# Patient Record
Sex: FEMALE | Race: WHITE | NOT HISPANIC OR LATINO
[De-identification: names, ages, dates, MRNs, and addresses within clinical notes are randomized per-mention and may not be internally consistent; named-entity substitution may affect disease eponyms.]

---

## 2017-01-24 ENCOUNTER — RECORD ABSTRACTING (OUTPATIENT)
Age: 77
End: 2017-01-24

## 2017-01-24 DIAGNOSIS — Z87.898 PERSONAL HISTORY OF OTHER SPECIFIED CONDITIONS: ICD-10-CM

## 2017-01-24 DIAGNOSIS — Z87.2 PERSONAL HISTORY OF DISEASES OF THE SKIN AND SUBCUTANEOUS TISSUE: ICD-10-CM

## 2017-01-24 DIAGNOSIS — Z78.9 OTHER SPECIFIED HEALTH STATUS: ICD-10-CM

## 2017-01-24 DIAGNOSIS — L21.0 SEBORRHEA CAPITIS: ICD-10-CM

## 2017-01-24 DIAGNOSIS — Z87.828 PERSONAL HISTORY OF OTHER (HEALED) PHYSICAL INJURY AND TRAUMA: ICD-10-CM

## 2017-01-24 DIAGNOSIS — Z86.19 PERSONAL HISTORY OF OTHER INFECTIOUS AND PARASITIC DISEASES: ICD-10-CM

## 2017-01-24 DIAGNOSIS — R21 RASH AND OTHER NONSPECIFIC SKIN ERUPTION: ICD-10-CM

## 2017-01-24 DIAGNOSIS — L57.8 OTHER SKIN CHANGES DUE TO CHRONIC EXPOSURE TO NONIONIZING RADIATION: ICD-10-CM

## 2017-02-20 ENCOUNTER — APPOINTMENT (OUTPATIENT)
Dept: DERMATOLOGY | Facility: CLINIC | Age: 77
End: 2017-02-20

## 2017-02-20 DIAGNOSIS — Z12.83 ENCOUNTER FOR SCREENING FOR MALIGNANT NEOPLASM OF SKIN: ICD-10-CM

## 2017-02-20 RX ORDER — AZITHROMYCIN 500 MG/1
500 TABLET, FILM COATED ORAL
Qty: 7 | Refills: 0 | Status: DISCONTINUED | COMMUNITY
Start: 2016-09-16

## 2017-02-20 RX ORDER — DOXYCYCLINE HYCLATE 100 MG/1
100 TABLET ORAL
Qty: 42 | Refills: 0 | Status: DISCONTINUED | COMMUNITY
Start: 2016-11-23

## 2017-02-20 RX ORDER — SUMATRIPTAN 100 MG/1
100 TABLET, FILM COATED ORAL
Refills: 0 | Status: ACTIVE | COMMUNITY

## 2017-02-20 RX ORDER — FLUOCINOLONE ACETONIDE 0.1 MG/ML
0.01 SOLUTION TOPICAL
Refills: 0 | Status: DISCONTINUED | COMMUNITY
End: 2017-02-20

## 2017-02-20 RX ORDER — HYDROCORTISONE BUTYRATE 1 MG/ML
0.1 SOLUTION TOPICAL
Refills: 0 | Status: DISCONTINUED | COMMUNITY
End: 2017-02-20

## 2017-02-20 RX ORDER — ITRACONAZOLE 100 MG/1
CAPSULE, COATED PELLETS ORAL
Refills: 0 | Status: DISCONTINUED | COMMUNITY
End: 2017-02-20

## 2017-02-20 RX ORDER — KETOCONAZOLE 20 MG/ML
2 SHAMPOO TOPICAL
Refills: 0 | Status: DISCONTINUED | COMMUNITY
End: 2017-02-20

## 2017-02-20 RX ORDER — TRIAMCINOLONE ACETONIDE 1 MG/G
0.1 CREAM TOPICAL
Refills: 0 | Status: DISCONTINUED | COMMUNITY
End: 2017-02-20

## 2017-02-20 RX ORDER — NAPROXEN 500 MG/1
500 TABLET ORAL
Qty: 180 | Refills: 0 | Status: DISCONTINUED | COMMUNITY
Start: 2016-11-11

## 2017-02-20 RX ORDER — PREDNISONE 20 MG/1
20 TABLET ORAL
Qty: 9 | Refills: 0 | Status: DISCONTINUED | COMMUNITY
Start: 2016-11-23

## 2017-05-18 ENCOUNTER — MEDICATION RENEWAL (OUTPATIENT)
Age: 77
End: 2017-05-18

## 2017-08-06 ENCOUNTER — OTHER (OUTPATIENT)
Age: 77
End: 2017-08-06

## 2017-08-06 RX ORDER — AZITHROMYCIN DIHYDRATE 2 G/60ML
2 POWDER, FOR SUSPENSION ORAL
Qty: 1 | Refills: 2 | Status: COMPLETED | COMMUNITY
Start: 2017-05-18 | End: 2017-08-06

## 2017-09-27 ENCOUNTER — LABORATORY RESULT (OUTPATIENT)
Age: 77
End: 2017-09-27

## 2017-09-27 ENCOUNTER — CLINICAL ADVICE (OUTPATIENT)
Age: 77
End: 2017-09-27

## 2017-09-27 RX ORDER — DOXYCYCLINE HYCLATE 100 MG/1
100 CAPSULE ORAL TWICE DAILY
Qty: 42 | Refills: 0 | Status: COMPLETED | COMMUNITY
Start: 2017-09-27 | End: 2017-10-18

## 2017-09-28 ENCOUNTER — OTHER (OUTPATIENT)
Age: 77
End: 2017-09-28

## 2017-10-05 ENCOUNTER — LABORATORY RESULT (OUTPATIENT)
Age: 77
End: 2017-10-05

## 2017-10-05 DIAGNOSIS — G89.29 PAIN IN UNSPECIFIED JOINT: ICD-10-CM

## 2017-10-05 DIAGNOSIS — M25.50 PAIN IN UNSPECIFIED JOINT: ICD-10-CM

## 2017-10-09 PROBLEM — M25.50 CHRONIC JOINT PAIN: Status: ACTIVE | Noted: 2017-10-09

## 2017-10-11 ENCOUNTER — EMERGENCY (EMERGENCY)
Facility: HOSPITAL | Age: 77
LOS: 0 days | Discharge: HOME | End: 2017-10-11
Attending: EMERGENCY MEDICINE | Admitting: EMERGENCY MEDICINE
Payer: MEDICARE

## 2017-10-11 VITALS
HEART RATE: 95 BPM | RESPIRATION RATE: 18 BRPM | WEIGHT: 130.07 LBS | HEIGHT: 66 IN | TEMPERATURE: 98 F | SYSTOLIC BLOOD PRESSURE: 107 MMHG | DIASTOLIC BLOOD PRESSURE: 54 MMHG | OXYGEN SATURATION: 95 %

## 2017-10-11 VITALS
HEART RATE: 86 BPM | SYSTOLIC BLOOD PRESSURE: 124 MMHG | DIASTOLIC BLOOD PRESSURE: 75 MMHG | OXYGEN SATURATION: 95 % | RESPIRATION RATE: 18 BRPM

## 2017-10-11 DIAGNOSIS — J44.9 CHRONIC OBSTRUCTIVE PULMONARY DISEASE, UNSPECIFIED: ICD-10-CM
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
## 2017-10-11 DIAGNOSIS — T17.298A OTHER FOREIGN OBJECT IN PHARYNX CAUSING OTHER INJURY, INITIAL ENCOUNTER: ICD-10-CM

## 2017-10-11 DIAGNOSIS — Z87.891 PERSONAL HISTORY OF NICOTINE DEPENDENCE: ICD-10-CM

## 2017-10-11 PROCEDURE — 99284 EMERGENCY DEPT VISIT MOD MDM: CPT

## 2017-10-11 PROCEDURE — 93010 ELECTROCARDIOGRAM REPORT: CPT

## 2017-10-11 PROCEDURE — 71020: CPT | Mod: 26

## 2017-10-11 PROCEDURE — 70360 X-RAY EXAM OF NECK: CPT | Mod: 26

## 2017-10-11 RX ORDER — IPRATROPIUM/ALBUTEROL SULFATE 18-103MCG
3 AEROSOL WITH ADAPTER (GRAM) INHALATION ONCE
Qty: 0 | Refills: 0 | Status: COMPLETED | OUTPATIENT
Start: 2017-10-11 | End: 2017-10-11

## 2017-10-11 RX ORDER — SUCRALFATE 1 G
1 TABLET ORAL ONCE
Qty: 0 | Refills: 0 | Status: COMPLETED | OUTPATIENT
Start: 2017-10-11 | End: 2017-10-11

## 2017-10-11 RX ADMIN — Medication 3 MILLILITER(S): at 13:05

## 2017-10-11 RX ADMIN — Medication 1 GRAM(S): at 13:04

## 2017-10-11 NOTE — ED ADULT TRIAGE NOTE - CHIEF COMPLAINT QUOTE
pt was taking a doxycycline pill , began choking on pill, possible aspiration , throat began burning and coughing , pt was able to keep down a cup of milk post choking , pt is currently being treated for lymes

## 2017-10-11 NOTE — ED PROVIDER NOTE - ENMT, MLM
Patient tolerating fluid intake by mouth.  Airway patent, Nasal mucosa clear. Mouth with normal mucosa. Throat has no vesicles, no oropharyngeal exudates and uvula is midline.

## 2017-10-11 NOTE — ED PROVIDER NOTE - PROGRESS NOTE DETAILS
improved.  Sleeping in bed.  No active symptoms. improved.  Sleeping in bed.  No active symptoms.  Given nebulizer.  Discussed return conditions and risk for possible aspiration PNA given todays events.  Return if sob, fever, difficulty breathing.  See PMD if still having symptoms in 1-2 days. Tolerating PO fluids.  Says she feels well.  Will d/c.

## 2017-10-11 NOTE — ED PROVIDER NOTE - MEDICAL DECISION MAKING DETAILS
Pt with choking episode after swallowing doxycycline capsule.  Plan xray, reassess, PO challenge. Pt with choking episode after swallowing doxycycline capsule.  Plan xray, reassess, PO challenge.  Nebulizer.  Consider bronchospasm after choking.  No distress or focal unilateral wheezing now.  Carafate given pain when she swallows in her throat.

## 2017-10-11 NOTE — ED PROVIDER NOTE - NS_ ATTENDINGSCRIBEDETAILS _ED_A_ED_FT
I, Rito Valdez, performed the initial face to face bedside interview with this patient regarding history of present illness, review of symptoms and relevant past medical, social and family history.  I completed an independent physical examination.  I was the initial provider who evaluated this patient.  The history, relevant review of systems, past medical and surgical history, medical decision making, and physical examination was documented by the scribe in my presence and I attest to the accuracy of the documentation.

## 2017-10-11 NOTE — ED PROVIDER NOTE - MUSCULOSKELETAL, MLM
Spine appears normal, range of motion is not limited, no muscle or joint tenderness. No chest wall crepitus.

## 2017-10-11 NOTE — ED ADULT NURSE NOTE - CHIEF COMPLAINT QUOTE
pt presents to ED with complaints of pill stuck in her throat, pt states she took a doxycycline pill at approx 1015am

## 2017-10-11 NOTE — ED PROVIDER NOTE - OBJECTIVE STATEMENT
77 yo female PMH COPD, asthma, presents c/o throat pain and difficulty breathing.  Pt with recent Lyme disease dx on doxycycline, states she choked on doxycycline capsule this morning and was unable to breathe.  Pt was able to call her son and the ambulance shortly after symptoms improved. 75 yo female PMH COPD, asthma, presents c/o throat pain and difficulty breathing.  Pt with recent Lyme disease dx on doxycycline, states she choked on doxycycline capsule this morning and was unable to breathe.  She coughed it up but since then her throat hurts.  Pt was able to call her son and the ambulance shortly after symptoms improved.  Pt says her breathing is much better but her throat hurts mostly now.  Able to tolerate saliva.

## 2017-10-31 ENCOUNTER — APPOINTMENT (OUTPATIENT)
Dept: INTERNAL MEDICINE | Facility: CLINIC | Age: 77
End: 2017-10-31
Payer: MEDICARE

## 2017-10-31 VITALS
DIASTOLIC BLOOD PRESSURE: 80 MMHG | HEIGHT: 63 IN | RESPIRATION RATE: 16 BRPM | HEART RATE: 90 BPM | OXYGEN SATURATION: 95 % | SYSTOLIC BLOOD PRESSURE: 124 MMHG | WEIGHT: 115 LBS | BODY MASS INDEX: 20.38 KG/M2 | TEMPERATURE: 97.8 F

## 2017-10-31 PROCEDURE — 94060 EVALUATION OF WHEEZING: CPT

## 2017-10-31 PROCEDURE — ZZZZZ: CPT

## 2017-10-31 PROCEDURE — 99214 OFFICE O/P EST MOD 30 MIN: CPT | Mod: 25

## 2017-12-12 DIAGNOSIS — N60.19 DIFFUSE CYSTIC MASTOPATHY OF UNSPECIFIED BREAST: ICD-10-CM

## 2017-12-13 PROBLEM — N60.19 CYSTIC DISEASE OF BREAST: Status: ACTIVE | Noted: 2017-12-13

## 2017-12-27 ENCOUNTER — TRANSCRIPTION ENCOUNTER (OUTPATIENT)
Age: 77
End: 2017-12-27

## 2018-01-19 ENCOUNTER — RESULT REVIEW (OUTPATIENT)
Age: 78
End: 2018-01-19

## 2018-01-30 ENCOUNTER — APPOINTMENT (OUTPATIENT)
Dept: INTERNAL MEDICINE | Facility: CLINIC | Age: 78
End: 2018-01-30
Payer: MEDICARE

## 2018-01-30 ENCOUNTER — MED ADMIN CHARGE (OUTPATIENT)
Age: 78
End: 2018-01-30

## 2018-01-30 PROCEDURE — 90662 IIV NO PRSV INCREASED AG IM: CPT

## 2018-01-30 PROCEDURE — G0008: CPT

## 2018-02-14 ENCOUNTER — MEDICATION RENEWAL (OUTPATIENT)
Age: 78
End: 2018-02-14

## 2018-02-20 ENCOUNTER — APPOINTMENT (OUTPATIENT)
Dept: DERMATOLOGY | Facility: CLINIC | Age: 78
End: 2018-02-20
Payer: MEDICARE

## 2018-02-20 ENCOUNTER — MED ADMIN CHARGE (OUTPATIENT)
Age: 78
End: 2018-02-20

## 2018-02-20 VITALS — WEIGHT: 115 LBS | BODY MASS INDEX: 20.38 KG/M2 | HEIGHT: 63 IN

## 2018-02-20 PROCEDURE — 99213 OFFICE O/P EST LOW 20 MIN: CPT

## 2018-02-20 RX ORDER — DEXTROAMPHETAMINE SACCHARATE, AMPHETAMINE ASPARTATE MONOHYDRATE, DEXTROAMPHETAMINE SULFATE AND AMPHETAMINE SULFATE 7.5; 7.5; 7.5; 7.5 MG/1; MG/1; MG/1; MG/1
30 CAPSULE, EXTENDED RELEASE ORAL
Qty: 90 | Refills: 0 | Status: COMPLETED | COMMUNITY
Start: 2016-11-15 | End: 2018-02-20

## 2018-02-20 RX ORDER — ALBUTEROL SULFATE 90 UG/1
108 (90 BASE) AEROSOL, METERED RESPIRATORY (INHALATION)
Qty: 1 | Refills: 3 | Status: COMPLETED | COMMUNITY
Start: 2017-11-01 | End: 2018-02-20

## 2018-03-06 ENCOUNTER — LABORATORY RESULT (OUTPATIENT)
Age: 78
End: 2018-03-06

## 2018-03-06 ENCOUNTER — APPOINTMENT (OUTPATIENT)
Dept: RHEUMATOLOGY | Facility: CLINIC | Age: 78
End: 2018-03-06
Payer: MEDICARE

## 2018-03-06 VITALS
WEIGHT: 115 LBS | DIASTOLIC BLOOD PRESSURE: 85 MMHG | HEART RATE: 103 BPM | SYSTOLIC BLOOD PRESSURE: 147 MMHG | BODY MASS INDEX: 20.38 KG/M2 | HEIGHT: 63 IN

## 2018-03-06 DIAGNOSIS — Z82.61 FAMILY HISTORY OF ARTHRITIS: ICD-10-CM

## 2018-03-06 PROCEDURE — 99205 OFFICE O/P NEW HI 60 MIN: CPT

## 2018-03-08 LAB
ADJUSTED MITOGEN: >10 IU/ML
ADJUSTED TB AG: 0.04 IU/ML
ALBUMIN SERPL ELPH-MCNC: 3.9 G/DL
ALP BLD-CCNC: 135 U/L
ALT SERPL-CCNC: 8 U/L
ANA SER IF-ACNC: NEGATIVE
ANION GAP SERPL CALC-SCNC: 14 MMOL/L
APPEARANCE: CLEAR
AST SERPL-CCNC: 15 U/L
BASOPHILS # BLD AUTO: 0.06 K/UL
BASOPHILS NFR BLD AUTO: 0.6 %
BILIRUB SERPL-MCNC: 0.2 MG/DL
BILIRUBIN URINE: NEGATIVE
BLOOD URINE: NEGATIVE
BUN SERPL-MCNC: 21 MG/DL
C3 SERPL-MCNC: 139 MG/DL
C4 SERPL-MCNC: 40 MG/DL
CALCIUM SERPL-MCNC: 9.2 MG/DL
CCP AB SER IA-ACNC: <8 UNITS
CHLORIDE SERPL-SCNC: 99 MMOL/L
CO2 SERPL-SCNC: 26 MMOL/L
COLOR: YELLOW
CREAT SERPL-MCNC: 0.6 MG/DL
CRP SERPL-MCNC: 3.2 MG/DL
DSDNA AB SER-ACNC: <12 IU/ML
ENA RNP AB SER IA-ACNC: 4.8 AL
ENA SM AB SER IA-ACNC: <0.2 AL
ENA SS-A AB SER IA-ACNC: <0.2 AL
ENA SS-B AB SER IA-ACNC: <0.2 AL
EOSINOPHIL # BLD AUTO: 0.34 K/UL
EOSINOPHIL NFR BLD AUTO: 3.7 %
ERYTHROCYTE [SEDIMENTATION RATE] IN BLOOD BY WESTERGREN METHOD: 58 MM/HR
G6PD SER-CCNC: 16.2 U/G HGB
GLUCOSE QUALITATIVE U: NEGATIVE MG/DL
GLUCOSE SERPL-MCNC: 113 MG/DL
HBV CORE IGG+IGM SER QL: NONREACTIVE
HBV SURFACE AB SERPL IA-ACNC: <3 MIU/ML
HBV SURFACE AG SER QL: NONREACTIVE
HCT VFR BLD CALC: 42.2 %
HCV AB SER QL: NONREACTIVE
HCV S/CO RATIO: 0.12 S/CO
HGB BLD-MCNC: 13.5 G/DL
IMM GRANULOCYTES NFR BLD AUTO: 0.2 %
KETONES URINE: NEGATIVE
LEUKOCYTE ESTERASE URINE: ABNORMAL
LYMPHOCYTES # BLD AUTO: 2.43 K/UL
LYMPHOCYTES NFR BLD AUTO: 26.3 %
M TB IFN-G BLD-IMP: NEGATIVE
MAN DIFF?: NORMAL
MCHC RBC-ENTMCNC: 29.3 PG
MCHC RBC-ENTMCNC: 32 GM/DL
MCV RBC AUTO: 91.5 FL
MONOCYTES # BLD AUTO: 0.46 K/UL
MONOCYTES NFR BLD AUTO: 5 %
NEUTROPHILS # BLD AUTO: 5.93 K/UL
NEUTROPHILS NFR BLD AUTO: 64.2 %
NITRITE URINE: NEGATIVE
PH URINE: 5
PLATELET # BLD AUTO: 395 K/UL
POTASSIUM SERPL-SCNC: 4.3 MMOL/L
PROT SERPL-MCNC: 7.4 G/DL
PROTEIN URINE: NEGATIVE MG/DL
QUANTIFERON GOLD NIL: 0.09 IU/ML
RBC # BLD: 4.61 M/UL
RBC # FLD: 14.3 %
RF+CCP IGG SER-IMP: NEGATIVE
RHEUMATOID FACT SER QL: 13 IU/ML
SODIUM SERPL-SCNC: 139 MMOL/L
SPECIFIC GRAVITY URINE: 1.03
THYROGLOB AB SERPL-ACNC: <20 IU/ML
THYROPEROXIDASE AB SERPL IA-ACNC: <10 IU/ML
UROBILINOGEN URINE: NEGATIVE MG/DL
WBC # FLD AUTO: 9.24 K/UL

## 2018-03-20 ENCOUNTER — APPOINTMENT (OUTPATIENT)
Dept: RHEUMATOLOGY | Facility: CLINIC | Age: 78
End: 2018-03-20
Payer: MEDICARE

## 2018-03-20 VITALS
DIASTOLIC BLOOD PRESSURE: 86 MMHG | BODY MASS INDEX: 20.73 KG/M2 | HEIGHT: 63 IN | HEART RATE: 106 BPM | WEIGHT: 117 LBS | SYSTOLIC BLOOD PRESSURE: 152 MMHG

## 2018-03-20 PROCEDURE — 99215 OFFICE O/P EST HI 40 MIN: CPT

## 2018-03-21 RX ORDER — OFLOXACIN 3 MG/ML
0.3 SOLUTION/ DROPS OPHTHALMIC 4 TIMES DAILY
Qty: 1 | Refills: 3 | Status: COMPLETED | COMMUNITY
Start: 2017-12-08 | End: 2018-03-21

## 2018-05-01 ENCOUNTER — MEDICATION RENEWAL (OUTPATIENT)
Age: 78
End: 2018-05-01

## 2018-05-01 ENCOUNTER — APPOINTMENT (OUTPATIENT)
Dept: RHEUMATOLOGY | Facility: CLINIC | Age: 78
End: 2018-05-01
Payer: MEDICARE

## 2018-05-01 VITALS
HEART RATE: 97 BPM | HEIGHT: 63 IN | DIASTOLIC BLOOD PRESSURE: 84 MMHG | OXYGEN SATURATION: 98 % | SYSTOLIC BLOOD PRESSURE: 135 MMHG | WEIGHT: 120 LBS | BODY MASS INDEX: 21.26 KG/M2

## 2018-05-01 PROCEDURE — 99214 OFFICE O/P EST MOD 30 MIN: CPT

## 2018-05-03 LAB
25(OH)D3 SERPL-MCNC: 16.3 NG/ML
ALBUMIN SERPL ELPH-MCNC: 4.2 G/DL
ALP BLD-CCNC: 93 U/L
ALT SERPL-CCNC: 15 U/L
ANION GAP SERPL CALC-SCNC: 13 MMOL/L
AST SERPL-CCNC: 23 U/L
BASOPHILS # BLD AUTO: 0.04 K/UL
BASOPHILS NFR BLD AUTO: 0.4 %
BILIRUB SERPL-MCNC: 0.4 MG/DL
BUN SERPL-MCNC: 15 MG/DL
CALCIUM SERPL-MCNC: 9.6 MG/DL
CHLORIDE SERPL-SCNC: 100 MMOL/L
CO2 SERPL-SCNC: 27 MMOL/L
CREAT SERPL-MCNC: 0.81 MG/DL
CRP SERPL-MCNC: 0.5 MG/DL
EOSINOPHIL # BLD AUTO: 0.06 K/UL
EOSINOPHIL NFR BLD AUTO: 0.7 %
ERYTHROCYTE [SEDIMENTATION RATE] IN BLOOD BY WESTERGREN METHOD: 27 MM/HR
GLUCOSE SERPL-MCNC: 108 MG/DL
HCT VFR BLD CALC: 44.1 %
HGB BLD-MCNC: 13.6 G/DL
IMM GRANULOCYTES NFR BLD AUTO: 0.3 %
LYMPHOCYTES # BLD AUTO: 1.21 K/UL
LYMPHOCYTES NFR BLD AUTO: 13.1 %
MAN DIFF?: NORMAL
MCHC RBC-ENTMCNC: 28.9 PG
MCHC RBC-ENTMCNC: 30.8 GM/DL
MCV RBC AUTO: 93.6 FL
MONOCYTES # BLD AUTO: 0.25 K/UL
MONOCYTES NFR BLD AUTO: 2.7 %
NEUTROPHILS # BLD AUTO: 7.62 K/UL
NEUTROPHILS NFR BLD AUTO: 82.8 %
PLATELET # BLD AUTO: 319 K/UL
POTASSIUM SERPL-SCNC: 4.9 MMOL/L
PROT SERPL-MCNC: 7.3 G/DL
RBC # BLD: 4.71 M/UL
RBC # FLD: 15.1 %
SODIUM SERPL-SCNC: 140 MMOL/L
WBC # FLD AUTO: 9.21 K/UL

## 2018-05-07 ENCOUNTER — MEDICATION RENEWAL (OUTPATIENT)
Age: 78
End: 2018-05-07

## 2018-05-17 ENCOUNTER — MEDICATION RENEWAL (OUTPATIENT)
Age: 78
End: 2018-05-17

## 2018-05-17 ENCOUNTER — APPOINTMENT (OUTPATIENT)
Dept: INTERNAL MEDICINE | Facility: CLINIC | Age: 78
End: 2018-05-17
Payer: MEDICARE

## 2018-05-17 VITALS
BODY MASS INDEX: 21.26 KG/M2 | HEIGHT: 63 IN | OXYGEN SATURATION: 97 % | HEART RATE: 102 BPM | DIASTOLIC BLOOD PRESSURE: 70 MMHG | WEIGHT: 120 LBS | RESPIRATION RATE: 20 BRPM | SYSTOLIC BLOOD PRESSURE: 120 MMHG | TEMPERATURE: 98.3 F

## 2018-05-17 LAB
BASOPHILS # BLD AUTO: 0.04 K/UL
BASOPHILS NFR BLD AUTO: 0.4 %
EOSINOPHIL # BLD AUTO: 0.05 K/UL
EOSINOPHIL NFR BLD AUTO: 0.5 %
HCT VFR BLD CALC: 43.8 %
HGB BLD-MCNC: 13.5 G/DL
IMM GRANULOCYTES NFR BLD AUTO: 0.4 %
LYMPHOCYTES # BLD AUTO: 1.23 K/UL
LYMPHOCYTES NFR BLD AUTO: 13.3 %
MAN DIFF?: NORMAL
MCHC RBC-ENTMCNC: 29.3 PG
MCHC RBC-ENTMCNC: 30.8 GM/DL
MCV RBC AUTO: 95 FL
MONOCYTES # BLD AUTO: 0.36 K/UL
MONOCYTES NFR BLD AUTO: 3.9 %
NEUTROPHILS # BLD AUTO: 7.5 K/UL
NEUTROPHILS NFR BLD AUTO: 81.5 %
PLATELET # BLD AUTO: 314 K/UL
RBC # BLD: 4.61 M/UL
RBC # FLD: 15.3 %
WBC # FLD AUTO: 9.22 K/UL

## 2018-05-17 PROCEDURE — 99214 OFFICE O/P EST MOD 30 MIN: CPT

## 2018-05-17 NOTE — ASSESSMENT
[FreeTextEntry1] : #1 upper abdominal discomfort for one week. She has h.o. previous erosive gastritis early this year. she will resume omeprazole 20 mg p.o. q.d. Decrease or discontinue coffee. No irritants. Call or return if symptoms are not resolving.\par \par #2 mild diarrhea. Improved today. Ensure fluids. BRAT diet. Call or return if not resolving.\par \par #3 question of swelling of tongue by history. has ? tiny sore left lateral tongue. Treat with rinses p.r.n.   Her prednisone dose was increased to 20 mg/d by rheumatology.   Patient knows if she develops swelling or swelling and itching of tongue, throat, or lips, she will take Claritin 10 mg p.o. q.d. and Pepcid 20 mg p.o. q.d. and Benadryl p.r.n. If she does have significant swelling and itching she knows to go to go the ER.  Call or return if having any symptoms.\par \par #4 Sores corners of mouth. Improved.\par \par

## 2018-05-17 NOTE — PHYSICAL EXAM
[No Acute Distress] : no acute distress [Well Nourished] : well nourished [Well Developed] : well developed [Well-Appearing] : well-appearing [Normal Sclera/Conjunctiva] : normal sclera/conjunctiva [PERRL] : pupils equal round and reactive to light [Normal Outer Ear/Nose] : the outer ears and nose were normal in appearance [Normal Oropharynx] : the oropharynx was normal [No JVD] : no jugular venous distention [Supple] : supple [No Lymphadenopathy] : no lymphadenopathy [No Respiratory Distress] : no respiratory distress  [No Accessory Muscle Use] : no accessory muscle use [Normal Rate] : normal rate  [Regular Rhythm] : with a regular rhythm [Normal S1, S2] : normal S1 and S2 [No Murmur] : no murmur heard [No Edema] : there was no peripheral edema [No Extremity Clubbing/Cyanosis] : no extremity clubbing/cyanosis [Soft] : abdomen soft [Non Tender] : non-tender [Non-distended] : non-distended [No HSM] : no HSM [Normal Anterior Cervical Nodes] : no anterior cervical lymphadenopathy [No Rash] : no rash [de-identified] : tongue does not lookl red or swollen. ? tiny sore L lat area [de-identified] : some rhonchi bilat [de-identified] : 84, reg [de-identified] : ? sl upper guarding [de-identified] : alert

## 2018-05-17 NOTE — REVIEW OF SYSTEMS
[Fever] : no fever [Chills] : no chills [Night Sweats] : no night sweats [Vomiting] : no vomiting [Melena] : no melena [FreeTextEntry4] : see above [FreeTextEntry7] : see above

## 2018-05-17 NOTE — HISTORY OF PRESENT ILLNESS
[de-identified] : This is a pleasant 77-year-old female who has noted upper abdominal discomfort and mild diarrhea during the last week. The latter has improved and she had only one mushy stool this morning. Abdominal discomfort feels like it did in January when she was told she had erosive gastritis on upper endoscopy was treated with omeprazole. Also feels some gas. She is not having nausea, vomiting, blood per rectum, or melena  Has 1 coffee/ d.  no NSAID use or alcohol. Not not having fever, chills, sweats.\par \par She also had some sores on the corners of her mouth and these are resolved. She felt some swelling of the tongue and has a very \par small ? sore on the lateral left tongue area. Is not noting any itching of the tongue, throat, or lips. No swelling of the throat or lips.\par \par Rheumatology did not begin methotrexate.  prednisone was increased to 20 mg/d.\par \par \par \par

## 2018-05-20 LAB
ALBUMIN SERPL ELPH-MCNC: 4.1 G/DL
ALP BLD-CCNC: 86 U/L
ALT SERPL-CCNC: 18 U/L
ANION GAP SERPL CALC-SCNC: 12 MMOL/L
AST SERPL-CCNC: 21 U/L
BILIRUB SERPL-MCNC: 0.2 MG/DL
BUN SERPL-MCNC: 19 MG/DL
C3 SERPL-MCNC: 119 MG/DL
C4 SERPL-MCNC: 41 MG/DL
CALCIUM SERPL-MCNC: 9.3 MG/DL
CHLORIDE SERPL-SCNC: 104 MMOL/L
CO2 SERPL-SCNC: 27 MMOL/L
CREAT SERPL-MCNC: 0.75 MG/DL
CRP SERPL-MCNC: 1.3 MG/DL
DSDNA AB SER-ACNC: 13 IU/ML
ENDOMYSIUM IGA SER QL: NEGATIVE
ENDOMYSIUM IGA TITR SER: NORMAL
ERYTHROCYTE [SEDIMENTATION RATE] IN BLOOD BY WESTERGREN METHOD: 24 MM/HR
GLIADIN IGA SER QL: 5.6 UNITS
GLIADIN IGG SER QL: <5 UNITS
GLIADIN PEPTIDE IGA SER-ACNC: NEGATIVE
GLIADIN PEPTIDE IGG SER-ACNC: NEGATIVE
GLUCOSE SERPL-MCNC: 121 MG/DL
POTASSIUM SERPL-SCNC: 4.4 MMOL/L
PROT SERPL-MCNC: 7 G/DL
SODIUM SERPL-SCNC: 143 MMOL/L
TTG IGA SER IA-ACNC: 9.7 UNITS
TTG IGA SER-ACNC: NEGATIVE
TTG IGG SER IA-ACNC: <5 UNITS
TTG IGG SER IA-ACNC: NEGATIVE

## 2018-05-22 ENCOUNTER — MEDICATION RENEWAL (OUTPATIENT)
Age: 78
End: 2018-05-22

## 2018-05-22 RX ORDER — ESOMEPRAZOLE MAGNESIUM 40 MG/1
CAPSULE, DELAYED RELEASE ORAL
Refills: 0 | Status: COMPLETED | COMMUNITY
End: 2018-05-22

## 2018-05-25 ENCOUNTER — RX RENEWAL (OUTPATIENT)
Age: 78
End: 2018-05-25

## 2018-06-26 ENCOUNTER — APPOINTMENT (OUTPATIENT)
Dept: RHEUMATOLOGY | Facility: CLINIC | Age: 78
End: 2018-06-26
Payer: MEDICARE

## 2018-06-26 VITALS
SYSTOLIC BLOOD PRESSURE: 147 MMHG | WEIGHT: 125 LBS | OXYGEN SATURATION: 96 % | HEART RATE: 100 BPM | HEIGHT: 63 IN | BODY MASS INDEX: 22.15 KG/M2 | DIASTOLIC BLOOD PRESSURE: 80 MMHG

## 2018-06-26 PROCEDURE — 99214 OFFICE O/P EST MOD 30 MIN: CPT

## 2018-06-28 ENCOUNTER — MEDICATION RENEWAL (OUTPATIENT)
Age: 78
End: 2018-06-28

## 2018-06-29 ENCOUNTER — MEDICATION RENEWAL (OUTPATIENT)
Age: 78
End: 2018-06-29

## 2018-07-26 ENCOUNTER — RX RENEWAL (OUTPATIENT)
Age: 78
End: 2018-07-26

## 2018-07-31 LAB
ALBUMIN SERPL ELPH-MCNC: 4.2 G/DL
ALP BLD-CCNC: 66 U/L
ALT SERPL-CCNC: 21 U/L
ANION GAP SERPL CALC-SCNC: 13 MMOL/L
AST SERPL-CCNC: 24 U/L
BASOPHILS # BLD AUTO: 0.03 K/UL
BASOPHILS NFR BLD AUTO: 0.3 %
BILIRUB SERPL-MCNC: 0.6 MG/DL
BUN SERPL-MCNC: 21 MG/DL
CALCIUM SERPL-MCNC: 8.9 MG/DL
CHLORIDE SERPL-SCNC: 104 MMOL/L
CO2 SERPL-SCNC: 26 MMOL/L
CREAT SERPL-MCNC: 0.68 MG/DL
EOSINOPHIL # BLD AUTO: 0.08 K/UL
EOSINOPHIL NFR BLD AUTO: 0.8 %
GLUCOSE SERPL-MCNC: 89 MG/DL
HCT VFR BLD CALC: 43.5 %
HGB BLD-MCNC: 13.6 G/DL
IMM GRANULOCYTES NFR BLD AUTO: 0.7 %
LYMPHOCYTES # BLD AUTO: 1.14 K/UL
LYMPHOCYTES NFR BLD AUTO: 10.9 %
MAN DIFF?: NORMAL
MCHC RBC-ENTMCNC: 29.8 PG
MCHC RBC-ENTMCNC: 31.3 GM/DL
MCV RBC AUTO: 95.2 FL
MONOCYTES # BLD AUTO: 0.33 K/UL
MONOCYTES NFR BLD AUTO: 3.2 %
NEUTROPHILS # BLD AUTO: 8.8 K/UL
NEUTROPHILS NFR BLD AUTO: 84.1 %
PLATELET # BLD AUTO: 268 K/UL
POTASSIUM SERPL-SCNC: 4.4 MMOL/L
PROT SERPL-MCNC: 6.5 G/DL
RBC # BLD: 4.57 M/UL
RBC # FLD: 15.2 %
SODIUM SERPL-SCNC: 143 MMOL/L
WBC # FLD AUTO: 10.45 K/UL

## 2018-08-02 ENCOUNTER — APPOINTMENT (OUTPATIENT)
Dept: RHEUMATOLOGY | Facility: CLINIC | Age: 78
End: 2018-08-02
Payer: MEDICARE

## 2018-08-02 VITALS
WEIGHT: 125 LBS | BODY MASS INDEX: 22.15 KG/M2 | OXYGEN SATURATION: 97 % | HEART RATE: 95 BPM | DIASTOLIC BLOOD PRESSURE: 90 MMHG | HEIGHT: 63 IN | SYSTOLIC BLOOD PRESSURE: 138 MMHG

## 2018-08-02 PROBLEM — J45.909 UNSPECIFIED ASTHMA, UNCOMPLICATED: Chronic | Status: ACTIVE | Noted: 2017-10-11

## 2018-08-02 PROCEDURE — 99214 OFFICE O/P EST MOD 30 MIN: CPT | Mod: 25

## 2018-08-02 PROCEDURE — 20610 DRAIN/INJ JOINT/BURSA W/O US: CPT | Mod: RT

## 2018-08-02 RX ORDER — METHYLPREDNISOLONE ACETATE 80 MG/ML
80 INJECTION, SUSPENSION INTRA-ARTICULAR; INTRALESIONAL; INTRAMUSCULAR; SOFT TISSUE
Refills: 0 | Status: COMPLETED | OUTPATIENT
Start: 2018-08-02

## 2018-08-02 RX ADMIN — METHYLPREDNISOLONE ACETATE MG/ML: 80 INJECTION, SUSPENSION INTRA-ARTICULAR; INTRALESIONAL; INTRAMUSCULAR; SOFT TISSUE at 00:00

## 2018-08-07 ENCOUNTER — MEDICATION RENEWAL (OUTPATIENT)
Age: 78
End: 2018-08-07

## 2018-08-08 ENCOUNTER — APPOINTMENT (OUTPATIENT)
Dept: INTERNAL MEDICINE | Facility: CLINIC | Age: 78
End: 2018-08-08
Payer: MEDICARE

## 2018-08-08 VITALS
SYSTOLIC BLOOD PRESSURE: 142 MMHG | WEIGHT: 125 LBS | BODY MASS INDEX: 22.15 KG/M2 | DIASTOLIC BLOOD PRESSURE: 84 MMHG | HEART RATE: 90 BPM | OXYGEN SATURATION: 97 % | HEIGHT: 63 IN | RESPIRATION RATE: 18 BRPM | TEMPERATURE: 98.6 F

## 2018-08-08 PROCEDURE — 99214 OFFICE O/P EST MOD 30 MIN: CPT | Mod: 25

## 2018-08-08 PROCEDURE — 90471 IMMUNIZATION ADMIN: CPT | Mod: GY

## 2018-08-08 PROCEDURE — 90715 TDAP VACCINE 7 YRS/> IM: CPT | Mod: GY,GA

## 2018-08-08 NOTE — HISTORY OF PRESENT ILLNESS
[de-identified] : This is a 77-year-old female with a history of rheumatoid arthritis, mixed connective tissue disease, accidentally opened the car door and scraped her R anterior weeks ago. She is noting more swelling the last few days and some in tenderness to touch. the base of the scraped  does not show purulence. She is not having  fever, or chills. Temperature at home and here it is normal.\par \par She is on prednisone 7.5 mg p.o. q.d. hydroxychloroquine 200 mg p.o. b.i.d. She takes methotrexate 12.5 mg/week. She did not take her methotrexate dose yesterday and also will skip next Tuesday's dose.

## 2018-08-08 NOTE — ASSESSMENT
[FreeTextEntry1] : #1 Scrape anterior R leg with mild surrounding cellulitis. There is some mild swelling in the area of the scrape and some inferior to it. Gentle Betadine/water washes b.i.d.  mupirocin ointment t.i.d.  Cover with the sterile gauze. Importantly, elevate extremity as much as possible to treat swelling.  Minocycline 50 mg p.o. b.i.d. x7 days. (did not tolerate 100 mg doxycycline in past, but patrick 50 minocycline).  RV 1 week.  Call, return if not resolving.\par \par Hold methotrexate x 2 doses.\par \par CBC, BMP.

## 2018-08-08 NOTE — PHYSICAL EXAM
[No Acute Distress] : no acute distress [Well Nourished] : well nourished [Well Developed] : well developed [Well-Appearing] : well-appearing [Normal Sclera/Conjunctiva] : normal sclera/conjunctiva [PERRL] : pupils equal round and reactive to light [Normal Outer Ear/Nose] : the outer ears and nose were normal in appearance [Normal Oropharynx] : the oropharynx was normal [No JVD] : no jugular venous distention [Supple] : supple [No Lymphadenopathy] : no lymphadenopathy [No Respiratory Distress] : no respiratory distress  [Clear to Auscultation] : lungs were clear to auscultation bilaterally [No Accessory Muscle Use] : no accessory muscle use [Normal Rate] : normal rate  [Regular Rhythm] : with a regular rhythm [Normal S1, S2] : normal S1 and S2 [No Carotid Bruits] : no carotid bruits [No Edema] : there was no peripheral edema [No Extremity Clubbing/Cyanosis] : no extremity clubbing/cyanosis [Soft] : abdomen soft [Non Tender] : non-tender [Non-distended] : non-distended [No Masses] : no abdominal mass palpated [No HSM] : no HSM [Normal Bowel Sounds] : normal bowel sounds [Normal Anterior Cervical Nodes] : no anterior cervical lymphadenopathy [Normal Gait] : normal gait [Deep Tendon Reflexes (DTR)] : deep tendon reflexes were 2+ and symmetric [Normal Affect] : the affect was normal [Normal Insight/Judgement] : insight and judgment were intact [de-identified] : no palp ing LN's [de-identified] : scrape R ant leg, 1/2x1 in. base w/out gross purulence. mild swell around and inferiorly.  mild surrounding redness and mild warmth.

## 2018-08-09 ENCOUNTER — MEDICATION RENEWAL (OUTPATIENT)
Age: 78
End: 2018-08-09

## 2018-08-09 ENCOUNTER — TRANSCRIPTION ENCOUNTER (OUTPATIENT)
Age: 78
End: 2018-08-09

## 2018-08-15 ENCOUNTER — MEDICATION RENEWAL (OUTPATIENT)
Age: 78
End: 2018-08-15

## 2018-08-16 ENCOUNTER — APPOINTMENT (OUTPATIENT)
Dept: INTERNAL MEDICINE | Facility: CLINIC | Age: 78
End: 2018-08-16

## 2018-08-17 ENCOUNTER — TRANSCRIPTION ENCOUNTER (OUTPATIENT)
Age: 78
End: 2018-08-17

## 2018-08-17 ENCOUNTER — MEDICATION RENEWAL (OUTPATIENT)
Age: 78
End: 2018-08-17

## 2018-09-04 ENCOUNTER — RX RENEWAL (OUTPATIENT)
Age: 78
End: 2018-09-04

## 2018-09-06 ENCOUNTER — MEDICATION RENEWAL (OUTPATIENT)
Age: 78
End: 2018-09-06

## 2018-09-06 ENCOUNTER — TRANSCRIPTION ENCOUNTER (OUTPATIENT)
Age: 78
End: 2018-09-06

## 2018-09-13 LAB
25(OH)D3 SERPL-MCNC: 57 NG/ML
ALBUMIN SERPL ELPH-MCNC: 4.2 G/DL
ALP BLD-CCNC: 78 U/L
ALT SERPL-CCNC: 21 U/L
ANION GAP SERPL CALC-SCNC: 14 MMOL/L
AST SERPL-CCNC: 25 U/L
BASOPHILS # BLD AUTO: 0.04 K/UL
BASOPHILS NFR BLD AUTO: 0.5 %
BILIRUB SERPL-MCNC: 0.4 MG/DL
BUN SERPL-MCNC: 19 MG/DL
CALCIUM SERPL-MCNC: 9.5 MG/DL
CHLORIDE SERPL-SCNC: 105 MMOL/L
CO2 SERPL-SCNC: 25 MMOL/L
CREAT SERPL-MCNC: 0.79 MG/DL
CRP SERPL-MCNC: 0.23 MG/DL
EOSINOPHIL # BLD AUTO: 0.02 K/UL
EOSINOPHIL NFR BLD AUTO: 0.2 %
ERYTHROCYTE [SEDIMENTATION RATE] IN BLOOD BY WESTERGREN METHOD: 13 MM/HR
GLUCOSE SERPL-MCNC: 107 MG/DL
HCT VFR BLD CALC: 43.7 %
HGB BLD-MCNC: 13.6 G/DL
IMM GRANULOCYTES NFR BLD AUTO: 0.4 %
LYMPHOCYTES # BLD AUTO: 0.8 K/UL
LYMPHOCYTES NFR BLD AUTO: 9.4 %
MAN DIFF?: NORMAL
MCHC RBC-ENTMCNC: 29.8 PG
MCHC RBC-ENTMCNC: 31.1 GM/DL
MCV RBC AUTO: 95.8 FL
MONOCYTES # BLD AUTO: 0.18 K/UL
MONOCYTES NFR BLD AUTO: 2.1 %
NEUTROPHILS # BLD AUTO: 7.46 K/UL
NEUTROPHILS NFR BLD AUTO: 87.4 %
PLATELET # BLD AUTO: 284 K/UL
POTASSIUM SERPL-SCNC: 4.7 MMOL/L
PROT SERPL-MCNC: 6.7 G/DL
RBC # BLD: 4.56 M/UL
RBC # FLD: 15 %
SODIUM SERPL-SCNC: 144 MMOL/L
WBC # FLD AUTO: 8.53 K/UL

## 2018-10-02 ENCOUNTER — APPOINTMENT (OUTPATIENT)
Dept: RHEUMATOLOGY | Facility: CLINIC | Age: 78
End: 2018-10-02
Payer: MEDICARE

## 2018-10-02 VITALS
WEIGHT: 130 LBS | SYSTOLIC BLOOD PRESSURE: 152 MMHG | DIASTOLIC BLOOD PRESSURE: 89 MMHG | HEIGHT: 63 IN | HEART RATE: 93 BPM | BODY MASS INDEX: 23.04 KG/M2 | OXYGEN SATURATION: 98 %

## 2018-10-02 PROCEDURE — 99214 OFFICE O/P EST MOD 30 MIN: CPT

## 2018-10-02 RX ORDER — AZITHROMYCIN 250 MG/1
250 TABLET, FILM COATED ORAL DAILY
Qty: 14 | Refills: 0 | Status: DISCONTINUED | COMMUNITY
Start: 2018-06-29 | End: 2018-10-02

## 2018-10-02 RX ORDER — PREDNISONE 20 MG/1
20 TABLET ORAL DAILY
Qty: 90 | Refills: 3 | Status: DISCONTINUED | COMMUNITY
Start: 2018-05-01 | End: 2018-10-02

## 2018-10-02 RX ORDER — MINOCYCLINE HYDROCHLORIDE 50 MG/1
50 TABLET ORAL
Qty: 14 | Refills: 0 | Status: DISCONTINUED | COMMUNITY
Start: 2018-08-08 | End: 2018-10-02

## 2018-10-02 RX ORDER — RANITIDINE 150 MG/1
150 TABLET ORAL TWICE DAILY
Qty: 180 | Refills: 3 | Status: DISCONTINUED | COMMUNITY
Start: 2018-05-22 | End: 2018-10-02

## 2018-10-09 ENCOUNTER — MEDICATION RENEWAL (OUTPATIENT)
Age: 78
End: 2018-10-09

## 2018-10-11 ENCOUNTER — MEDICATION RENEWAL (OUTPATIENT)
Age: 78
End: 2018-10-11

## 2018-10-12 ENCOUNTER — APPOINTMENT (OUTPATIENT)
Dept: INTERNAL MEDICINE | Facility: CLINIC | Age: 78
End: 2018-10-12
Payer: MEDICARE

## 2018-10-12 ENCOUNTER — MED ADMIN CHARGE (OUTPATIENT)
Age: 78
End: 2018-10-12

## 2018-10-12 PROCEDURE — G0008: CPT

## 2018-10-12 PROCEDURE — 90662 IIV NO PRSV INCREASED AG IM: CPT

## 2018-11-28 ENCOUNTER — RX RENEWAL (OUTPATIENT)
Age: 78
End: 2018-11-28

## 2018-11-29 ENCOUNTER — TRANSCRIPTION ENCOUNTER (OUTPATIENT)
Age: 78
End: 2018-11-29

## 2018-11-29 ENCOUNTER — MEDICATION RENEWAL (OUTPATIENT)
Age: 78
End: 2018-11-29

## 2018-12-04 ENCOUNTER — APPOINTMENT (OUTPATIENT)
Dept: RHEUMATOLOGY | Facility: CLINIC | Age: 78
End: 2018-12-04
Payer: MEDICARE

## 2018-12-04 VITALS
OXYGEN SATURATION: 95 % | DIASTOLIC BLOOD PRESSURE: 84 MMHG | SYSTOLIC BLOOD PRESSURE: 148 MMHG | HEART RATE: 90 BPM | WEIGHT: 130 LBS | BODY MASS INDEX: 23.04 KG/M2 | HEIGHT: 63 IN

## 2018-12-04 LAB
ALBUMIN SERPL ELPH-MCNC: 4.4 G/DL
ALP BLD-CCNC: 89 U/L
ALT SERPL-CCNC: 16 U/L
ANION GAP SERPL CALC-SCNC: 16 MMOL/L
AST SERPL-CCNC: 21 U/L
BASOPHILS # BLD AUTO: 0.05 K/UL
BASOPHILS NFR BLD AUTO: 0.4 %
BILIRUB SERPL-MCNC: 0.4 MG/DL
BUN SERPL-MCNC: 31 MG/DL
CALCIUM SERPL-MCNC: 9.5 MG/DL
CHLORIDE SERPL-SCNC: 101 MMOL/L
CO2 SERPL-SCNC: 25 MMOL/L
CREAT SERPL-MCNC: 1.05 MG/DL
CRP SERPL-MCNC: 0.17 MG/DL
EOSINOPHIL # BLD AUTO: 0.15 K/UL
EOSINOPHIL NFR BLD AUTO: 1.3 %
ERYTHROCYTE [SEDIMENTATION RATE] IN BLOOD BY WESTERGREN METHOD: 9 MM/HR
GLUCOSE SERPL-MCNC: 72 MG/DL
HCT VFR BLD CALC: 45.2 %
HGB BLD-MCNC: 14.3 G/DL
IMM GRANULOCYTES NFR BLD AUTO: 0.3 %
LYMPHOCYTES # BLD AUTO: 2.04 K/UL
LYMPHOCYTES NFR BLD AUTO: 17.6 %
MAN DIFF?: NORMAL
MCHC RBC-ENTMCNC: 31 PG
MCHC RBC-ENTMCNC: 31.6 GM/DL
MCV RBC AUTO: 98 FL
MONOCYTES # BLD AUTO: 0.62 K/UL
MONOCYTES NFR BLD AUTO: 5.4 %
NEUTROPHILS # BLD AUTO: 8.66 K/UL
NEUTROPHILS NFR BLD AUTO: 75 %
PLATELET # BLD AUTO: 341 K/UL
POTASSIUM SERPL-SCNC: 5.1 MMOL/L
PROT SERPL-MCNC: 7 G/DL
RBC # BLD: 4.61 M/UL
RBC # FLD: 14.5 %
SODIUM SERPL-SCNC: 142 MMOL/L
WBC # FLD AUTO: 11.56 K/UL

## 2018-12-04 PROCEDURE — 99214 OFFICE O/P EST MOD 30 MIN: CPT | Mod: 25

## 2018-12-04 PROCEDURE — 20610 DRAIN/INJ JOINT/BURSA W/O US: CPT | Mod: LT

## 2018-12-04 RX ORDER — METHYLPREDNISOLONE ACETATE 80 MG/ML
80 INJECTION, SUSPENSION INTRA-ARTICULAR; INTRALESIONAL; INTRAMUSCULAR; SOFT TISSUE
Refills: 0 | Status: COMPLETED | OUTPATIENT
Start: 2018-12-04

## 2018-12-04 RX ADMIN — METHYLPREDNISOLONE ACETATE MG/ML: 80 INJECTION, SUSPENSION INTRA-ARTICULAR; INTRALESIONAL; INTRAMUSCULAR; SOFT TISSUE at 00:00

## 2018-12-11 ENCOUNTER — FORM ENCOUNTER (OUTPATIENT)
Age: 78
End: 2018-12-11

## 2018-12-12 ENCOUNTER — APPOINTMENT (OUTPATIENT)
Dept: MAMMOGRAPHY | Facility: CLINIC | Age: 78
End: 2018-12-12
Payer: MEDICARE

## 2018-12-12 ENCOUNTER — OUTPATIENT (OUTPATIENT)
Dept: OUTPATIENT SERVICES | Facility: HOSPITAL | Age: 78
LOS: 1 days | End: 2018-12-12
Payer: MEDICARE

## 2018-12-12 ENCOUNTER — APPOINTMENT (OUTPATIENT)
Dept: RADIOLOGY | Facility: CLINIC | Age: 78
End: 2018-12-12
Payer: MEDICARE

## 2018-12-12 DIAGNOSIS — Z00.8 ENCOUNTER FOR OTHER GENERAL EXAMINATION: ICD-10-CM

## 2018-12-12 PROCEDURE — 77080 DXA BONE DENSITY AXIAL: CPT | Mod: 26

## 2018-12-12 PROCEDURE — 77067 SCR MAMMO BI INCL CAD: CPT | Mod: 26

## 2018-12-12 PROCEDURE — 77080 DXA BONE DENSITY AXIAL: CPT

## 2018-12-12 PROCEDURE — 77063 BREAST TOMOSYNTHESIS BI: CPT | Mod: 26

## 2018-12-12 PROCEDURE — 77063 BREAST TOMOSYNTHESIS BI: CPT

## 2018-12-12 PROCEDURE — 77067 SCR MAMMO BI INCL CAD: CPT

## 2018-12-14 ENCOUNTER — APPOINTMENT (OUTPATIENT)
Dept: INTERNAL MEDICINE | Facility: CLINIC | Age: 78
End: 2018-12-14

## 2018-12-17 ENCOUNTER — APPOINTMENT (OUTPATIENT)
Dept: INTERNAL MEDICINE | Facility: CLINIC | Age: 78
End: 2018-12-17
Payer: MEDICARE

## 2018-12-17 VITALS
BODY MASS INDEX: 22.86 KG/M2 | TEMPERATURE: 97.9 F | HEART RATE: 99 BPM | RESPIRATION RATE: 18 BRPM | DIASTOLIC BLOOD PRESSURE: 84 MMHG | SYSTOLIC BLOOD PRESSURE: 138 MMHG | WEIGHT: 129 LBS | HEIGHT: 63 IN | OXYGEN SATURATION: 94 %

## 2018-12-17 DIAGNOSIS — M54.9 DORSALGIA, UNSPECIFIED: ICD-10-CM

## 2018-12-17 DIAGNOSIS — R53.83 OTHER FATIGUE: ICD-10-CM

## 2018-12-17 PROCEDURE — 94727 GAS DIL/WSHOT DETER LNG VOL: CPT

## 2018-12-17 PROCEDURE — 99214 OFFICE O/P EST MOD 30 MIN: CPT | Mod: 25

## 2018-12-17 PROCEDURE — 94729 DIFFUSING CAPACITY: CPT

## 2018-12-17 PROCEDURE — ZZZZZ: CPT

## 2018-12-17 PROCEDURE — 94060 EVALUATION OF WHEEZING: CPT

## 2018-12-17 RX ORDER — ERGOCALCIFEROL 1.25 MG/1
1.25 MG CAPSULE, LIQUID FILLED ORAL
Qty: 4 | Refills: 0 | Status: DISCONTINUED | COMMUNITY
Start: 2018-05-17 | End: 2018-12-17

## 2018-12-17 RX ORDER — ROSUVASTATIN CALCIUM 5 MG/1
TABLET, FILM COATED ORAL
Refills: 0 | Status: DISCONTINUED | COMMUNITY
End: 2018-12-17

## 2018-12-18 ENCOUNTER — RX RENEWAL (OUTPATIENT)
Age: 78
End: 2018-12-18

## 2018-12-20 RX ORDER — METRONIDAZOLE 250 MG/1
250 TABLET ORAL 3 TIMES DAILY
Qty: 60 | Refills: 2 | Status: COMPLETED | COMMUNITY
Start: 2018-12-20 | End: 2019-02-18

## 2019-01-04 ENCOUNTER — RX RENEWAL (OUTPATIENT)
Age: 79
End: 2019-01-04

## 2019-02-06 LAB
25(OH)D3 SERPL-MCNC: 24.2 NG/ML
ALBUMIN SERPL ELPH-MCNC: 4.5 G/DL
ALP BLD-CCNC: 93 U/L
ALT SERPL-CCNC: 15 U/L
ANION GAP SERPL CALC-SCNC: 15 MMOL/L
AST SERPL-CCNC: 24 U/L
BASOPHILS # BLD AUTO: 0.08 K/UL
BASOPHILS NFR BLD AUTO: 0.8 %
BILIRUB SERPL-MCNC: 0.5 MG/DL
BUN SERPL-MCNC: 22 MG/DL
C3 SERPL-MCNC: 137 MG/DL
C4 SERPL-MCNC: 44 MG/DL
CALCIUM SERPL-MCNC: 9.4 MG/DL
CCP AB SER IA-ACNC: <8 UNITS
CHLORIDE SERPL-SCNC: 105 MMOL/L
CK SERPL-CCNC: 224 U/L
CO2 SERPL-SCNC: 24 MMOL/L
CREAT SERPL-MCNC: 0.88 MG/DL
CRP SERPL-MCNC: 0.28 MG/DL
EOSINOPHIL # BLD AUTO: 0.22 K/UL
EOSINOPHIL NFR BLD AUTO: 2.2 %
ERYTHROCYTE [SEDIMENTATION RATE] IN BLOOD BY WESTERGREN METHOD: 16 MM/HR
GLUCOSE SERPL-MCNC: 70 MG/DL
HCT VFR BLD CALC: 44.5 %
HGB BLD-MCNC: 13.8 G/DL
IMM GRANULOCYTES NFR BLD AUTO: 0.4 %
LYMPHOCYTES # BLD AUTO: 2.14 K/UL
LYMPHOCYTES NFR BLD AUTO: 21.3 %
MAN DIFF?: NORMAL
MCHC RBC-ENTMCNC: 30 PG
MCHC RBC-ENTMCNC: 31 GM/DL
MCV RBC AUTO: 96.7 FL
MONOCYTES # BLD AUTO: 0.6 K/UL
MONOCYTES NFR BLD AUTO: 6 %
NEUTROPHILS # BLD AUTO: 6.95 K/UL
NEUTROPHILS NFR BLD AUTO: 69.3 %
PLATELET # BLD AUTO: 287 K/UL
POTASSIUM SERPL-SCNC: 4.5 MMOL/L
PROT SERPL-MCNC: 7 G/DL
RBC # BLD: 4.6 M/UL
RBC # FLD: 14.3 %
RF+CCP IGG SER-IMP: NEGATIVE
RHEUMATOID FACT SER QL: 10 IU/ML
SODIUM SERPL-SCNC: 144 MMOL/L
WBC # FLD AUTO: 10.03 K/UL

## 2019-02-07 ENCOUNTER — APPOINTMENT (OUTPATIENT)
Dept: RHEUMATOLOGY | Facility: CLINIC | Age: 79
End: 2019-02-07
Payer: MEDICARE

## 2019-02-07 VITALS
DIASTOLIC BLOOD PRESSURE: 87 MMHG | HEIGHT: 63 IN | BODY MASS INDEX: 22.86 KG/M2 | WEIGHT: 129 LBS | HEART RATE: 110 BPM | SYSTOLIC BLOOD PRESSURE: 146 MMHG | OXYGEN SATURATION: 95 %

## 2019-02-07 PROCEDURE — 99214 OFFICE O/P EST MOD 30 MIN: CPT

## 2019-02-07 RX ORDER — DEXLANSOPRAZOLE 60 MG/1
60 CAPSULE, DELAYED RELEASE ORAL
Refills: 0 | Status: DISCONTINUED | COMMUNITY
End: 2019-02-07

## 2019-02-07 NOTE — ASSESSMENT
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 She is currently on prednisone 5mg daily though admits to only taking about 3x/week, MTX 15mg q week, and plaquenil. Her overall disease is improved. her knee OA is stable. Her biggest rheum issue today is her left shoulder pain, likely bursitis/rotator cuff tendonitis. Her inflammatory markers have normalized on current therapy. \par \par Plan:\par RA/+RNP- overall stable. \par -continue plaquenil 200mg BID for seronegative RA/+RNP. G6PD is WNL. \par -Resume MTX SubQ to 15mg q week. \par -In folic acid to 3mg daily for fatigue. \par -decrease prednisone to 2.5mg daily\par -XR chest 9/2015 reviewed, biapical pleural thickening seen. She did f/u with Dr. Walls.  Had PFTs, stable and no evidence of restrictive or obstructive lung disease.  \par -She has hx of smoking, distant.  She is up to date with PFTs/ECHO. Advised continued f/u with Pulm. Going for sleep study.\par -She will need at a minimum yearly screening with PFTs and ECHO. \par -deferring amlodipine for raynaud's.  To continue to use conservative measures for hand warming.  \par -flu shot 10/12/18\par \par Left shoulder pain/impingement\par -80mg depomedrol administered to left SAB 12/4/18- didn't help too much\par \par GI symptoms/GERD- improved\par -Now takes sucralfate and omeprazole which has helped, Rx'd by .  \par \par Knee OA- doing well today\par -Right knee injected with 80mg depomedrol 8/2/18. \par \par Vit D deficiency-improved\par -cont Vit D to 4000IU daily.   Vit D slightly low.  Will check again in 3 months\par \par \par f/u 2 months, labs before next visit.

## 2019-02-07 NOTE — DATA REVIEWED
[FreeTextEntry1] : 2/5/19 labs reviewed\par Inflammatory markers WNL\par \par RNP 1.4\par Vit D 24.2

## 2019-02-07 NOTE — HISTORY OF PRESENT ILLNESS
[FreeTextEntry1] : 79 y/o woman with hx of GERD, COPD/asthmatic component, borderline elevated HTN, HLD on crestor, degenerative disc disease of spine, and on adderall for paralytic sleep and attention deficit, here for f/u of her inflammatory arthritis. The diagnosis is most likely seronegative RA/with +RNP/-GABBY. She is on MTX 15mg q week and folic acid 2mg daily and plaquenil 200mg BID. \par \par \par On average 2-3x/week she remember vitamin d\par Has been working with Dr. Lemus.  Taking sucralfate, which helps her acid reflux.  \par She hasn't seen bluish coloration in her hands. Uses/has gloves everywhere.\par \par Just has L shoulder pain 6/10\par Tingling occ in hands, but doesn't drop anything\par She is supposed to take Prednisone 5mg but doesn't take consistently daily.  Doesn't feel much difference on it vs. off it.  Hasn't been off of it more than a few days.  \par She is going to have a sleep study to evaluate her fatigue.  Her PFTs with Dr. Walls were stable.  \par Also urinates 4x/day.\par Forgets the plaquenil sometimes at night.  \par Her CPK was mildly elevated.  She Is able to lift 30 bags every week and 1/2 of 20 pounds each.  \par \par Last ECHo 2/2018, was started on lipitor.\par 12/17/18 last PFTs\par \par \par Able to squat without hands\par No active synovitis. left 1st MCP decreased swelling\par Left shoulder still with some pain, injection didn't help.  \par LE edema.  \par

## 2019-02-08 LAB
ENA RNP AB SER IA-ACNC: 1.4 AL
ENA SM AB SER IA-ACNC: <0.2 AL

## 2019-02-20 ENCOUNTER — APPOINTMENT (OUTPATIENT)
Dept: DERMATOLOGY | Facility: CLINIC | Age: 79
End: 2019-02-20
Payer: MEDICARE

## 2019-02-20 VITALS — BODY MASS INDEX: 22.86 KG/M2 | HEIGHT: 63 IN | WEIGHT: 129 LBS

## 2019-02-20 PROCEDURE — 17000 DESTRUCT PREMALG LESION: CPT

## 2019-02-20 PROCEDURE — 99213 OFFICE O/P EST LOW 20 MIN: CPT | Mod: 25

## 2019-02-20 NOTE — PHYSICAL EXAM
[Alert] : alert [Oriented x 3] : ~L oriented x 3 [Well Nourished] : well nourished [Full Body Skin Exam Performed] : performed [Eyelids] : Eyelids [Ears] : Ears [Lips] : Lips [Neck] : Neck [FreeTextEntry3] : A full skin exam was performed including the scalp, face, neck, chest, abdomen, back, buttocks, upper extremities and lower extremities.  The patient declined examination of the breasts and genitalia.  \par The exam did not reveal any evidence of skin cancer, showing only the following benign growths:\par Seborrheic keratoses.\par Lentigines.\par Cherry angioma.\par \par keratotic papule, erythematous base, lateral right lower leg.

## 2019-02-20 NOTE — HISTORY OF PRESENT ILLNESS
[FreeTextEntry1] : Patient presents for skin examination. [de-identified] : Notes rough lesion of the right lower leg.  No bleeding or tenderness.  No self tx.  present for months.

## 2019-02-21 ENCOUNTER — LABORATORY RESULT (OUTPATIENT)
Age: 79
End: 2019-02-21

## 2019-02-22 ENCOUNTER — NON-APPOINTMENT (OUTPATIENT)
Age: 79
End: 2019-02-22

## 2019-02-22 ENCOUNTER — APPOINTMENT (OUTPATIENT)
Dept: INTERNAL MEDICINE | Facility: CLINIC | Age: 79
End: 2019-02-22
Payer: MEDICARE

## 2019-02-22 VITALS
BODY MASS INDEX: 23.04 KG/M2 | DIASTOLIC BLOOD PRESSURE: 84 MMHG | WEIGHT: 130 LBS | OXYGEN SATURATION: 96 % | TEMPERATURE: 98.3 F | SYSTOLIC BLOOD PRESSURE: 140 MMHG | HEIGHT: 63 IN | HEART RATE: 97 BPM | RESPIRATION RATE: 18 BRPM

## 2019-02-22 DIAGNOSIS — H10.33 UNSPECIFIED ACUTE CONJUNCTIVITIS, BILATERAL: ICD-10-CM

## 2019-02-22 DIAGNOSIS — K29.60 OTHER GASTRITIS W/OUT BLEEDING: ICD-10-CM

## 2019-02-22 DIAGNOSIS — Z92.29 PERSONAL HISTORY OF OTHER DRUG THERAPY: ICD-10-CM

## 2019-02-22 DIAGNOSIS — Z87.09 PERSONAL HISTORY OF OTHER DISEASES OF THE RESPIRATORY SYSTEM: ICD-10-CM

## 2019-02-22 DIAGNOSIS — Z87.2 PERSONAL HISTORY OF DISEASES OF THE SKIN AND SUBCUTANEOUS TISSUE: ICD-10-CM

## 2019-02-22 DIAGNOSIS — M75.50 BURSITIS OF UNSPECIFIED SHOULDER: ICD-10-CM

## 2019-02-22 DIAGNOSIS — R76.8 OTHER SPECIFIED ABNORMAL IMMUNOLOGICAL FINDINGS IN SERUM: ICD-10-CM

## 2019-02-22 DIAGNOSIS — R10.12 RIGHT UPPER QUADRANT PAIN: ICD-10-CM

## 2019-02-22 DIAGNOSIS — R80.9 PROTEINURIA, UNSPECIFIED: ICD-10-CM

## 2019-02-22 DIAGNOSIS — K14.8 OTHER DISEASES OF TONGUE: ICD-10-CM

## 2019-02-22 DIAGNOSIS — S81.801A UNSPECIFIED OPEN WOUND, RIGHT LOWER LEG, INITIAL ENCOUNTER: ICD-10-CM

## 2019-02-22 DIAGNOSIS — Z87.39 PERSONAL HISTORY OF OTHER DISEASES OF THE MUSCULOSKELETAL SYSTEM AND CONNECTIVE TISSUE: ICD-10-CM

## 2019-02-22 DIAGNOSIS — Z87.898 PERSONAL HISTORY OF OTHER SPECIFIED CONDITIONS: ICD-10-CM

## 2019-02-22 DIAGNOSIS — R10.11 RIGHT UPPER QUADRANT PAIN: ICD-10-CM

## 2019-02-22 PROCEDURE — 99214 OFFICE O/P EST MOD 30 MIN: CPT | Mod: 25

## 2019-02-22 PROCEDURE — 94060 EVALUATION OF WHEEZING: CPT

## 2019-02-22 RX ORDER — PREDNISONE 5 MG/1
5 TABLET ORAL
Qty: 90 | Refills: 0 | Status: DISCONTINUED | COMMUNITY
Start: 2018-06-26 | End: 2019-02-22

## 2019-02-22 RX ORDER — PREDNISONE 2.5 MG/1
2.5 TABLET ORAL
Qty: 90 | Refills: 1 | Status: DISCONTINUED | COMMUNITY
Start: 2018-10-02 | End: 2019-02-22

## 2019-02-22 NOTE — PLAN
[FreeTextEntry1] : Mrs. Fine presents for a followup evaluation.\par \par #1. Comprehensive blood profile was reviewed with patient.\par \par #2. Patient will continue on current medication regimen.\par \par #3. She will be scheduled for a high sonography examination to rule out obstructive sleep apnea.\par \par #4. Patient will be referred to Dr. Juan C Balderrama full orthopedic evaluation for left shoulder pain.\par \par #5. Continued rheumatology followup with Dr. Parada\par \par #6. Followup is scheduled

## 2019-02-22 NOTE — HISTORY OF PRESENT ILLNESS
[FreeTextEntry1] : Followup evaluation [de-identified] : Mrs. Fine presents for followup evaluation. She is complaining left shoulder pain for the past several weeks. She does have a history of seronegative rheumatoid arthritis. She received a steroid injection by her rheumatologist, Dr. Parada, however there was no significant improvement in the pain. Patient denies any chest pain or palpitations. She is complaining of some mild shortness of breath with exertion. She presents for review a comprehensive blood profile. Mrs. Fine is also complaining of fatigue during the day. There is no history of witnessed apneas. She denies any difficulty with cognition.

## 2019-02-25 ENCOUNTER — RX RENEWAL (OUTPATIENT)
Age: 79
End: 2019-02-25

## 2019-03-02 ENCOUNTER — TRANSCRIPTION ENCOUNTER (OUTPATIENT)
Age: 79
End: 2019-03-02

## 2019-03-07 ENCOUNTER — APPOINTMENT (OUTPATIENT)
Dept: RHEUMATOLOGY | Facility: CLINIC | Age: 79
End: 2019-03-07
Payer: MEDICARE

## 2019-03-07 VITALS
RESPIRATION RATE: 16 BRPM | DIASTOLIC BLOOD PRESSURE: 86 MMHG | SYSTOLIC BLOOD PRESSURE: 138 MMHG | BODY MASS INDEX: 23.04 KG/M2 | WEIGHT: 130 LBS | HEART RATE: 89 BPM | OXYGEN SATURATION: 95 % | TEMPERATURE: 97.3 F | HEIGHT: 63 IN

## 2019-03-07 PROCEDURE — 99214 OFFICE O/P EST MOD 30 MIN: CPT

## 2019-03-08 LAB
HBV CORE IGG+IGM SER QL: NONREACTIVE
HBV SURFACE AB SERPL IA-ACNC: <3 MIU/ML
HBV SURFACE AG SER QL: NONREACTIVE
HCV AB SER QL: NONREACTIVE
HCV S/CO RATIO: 0.1 S/CO

## 2019-03-09 NOTE — HISTORY OF PRESENT ILLNESS
[FreeTextEntry1] : 77 y/o woman with hx of GERD, COPD/asthmatic component, borderline elevated HTN, HLD on crestor, degenerative disc disease of spine, and on adderall for paralytic sleep and attention deficit, here for f/u of her inflammatory arthritis. I am treating her for seronegative RA/with +RNP/-GABBY. She is on MTX/folic acid and plaquenil 200mg BID. \par \par mtx makes her fatigue and gives her bad abdominal pain.  She is not tolerating it very well.  \par last dose was 1st or 2nd week of february.  \par Her joints have been stable.  Overall pain is 8/10 in intensity with 30 minutes of AM stiffness.  \par Going to see Dr. Melendez for left shoulder pain on tuesday.\par She had tapered off prednisone 1 month ago.\par Recently started symbicort for her COPD\par She had a sleep study a few days ago. Doesn't have results yet. \par \par She denies any recent fevers, chills, infections, \par \par +dry mouth\par +dry eye\par no rashes, no personal or known fam hx of psoriasis.\par \par Cancer screening:\par CT chest in 10/30/17: b/L stable nodules\par Last mammogram was 12/29/17: negative. \par EGD 1/19/18: Erosive gastritis\par Colonoscopy 1/19/18: normal. \par  \par \par \par

## 2019-03-09 NOTE — ASSESSMENT
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 She is currently off prednisone.  She has found difficulty tolerating MTX. Her overall disease is improved. her knee OA is stable. She continues to have leftt shoulder pain, likely bursitis/rotator cuff tendonitis. \par \par Plan:\par RA/+RNP- overall stable. \par -continue plaquenil 200mg BID for seronegative RA/+RNP. G6PD is WNL. She goes to her eye doctor for monitoring q6 months.  \par -Stop MTX and folic acid.   \par -remain off prednisone\par -Rx Rituxan 1000mg q 14 days x 2.   Potential risks of rituxan including but not limited to infections, some serious, infusion reactions, cardiovascular effects, and allergic reactions\par -XR chest 9/2015 reviewed, biapical pleural thickening seen. She did f/u with Dr. Walls. Had PFTs, stable and no evidence of restrictive or obstructive lung disease. \par -She has hx of smoking, distant. She is up to date with PFTs/ECHO. Advised continued f/u with Pulm. \par -She will need routine screening with PFTs and ECHO. \par -deferring amlodipine for raynaud's. To continue to use conservative measures for hand warming. \par -flu shot 10/12/18\par \par Left shoulder pain/impingement\par -80mg depomedrol administered to left SAB 12/4/18- didn't help too much\par \par GI symptoms/GERD- improved with medications\par -Now takes sucralfate and omeprazole which has helped, Rx'd by . \par \par Knee OA- doing well today\par -Right knee injected with 80mg depomedrol 8/2/18. \par \par Vit D deficiency-improved\par -cont Vit D is 24.2.  Continue Vit D supplementation\par \par \par f/u 2 months, labs before next visit. \par \par

## 2019-03-12 LAB
M TB IFN-G BLD-IMP: ABNORMAL
QUANTIFERON TB PLUS MITOGEN MINUS NIL: 0.19 IU/ML
QUANTIFERON TB PLUS NIL: 0.03 IU/ML
QUANTIFERON TB PLUS TB1 MINUS NIL: 0 IU/ML
QUANTIFERON TB PLUS TB2 MINUS NIL: -0.01 IU/ML

## 2019-04-01 ENCOUNTER — MEDICATION RENEWAL (OUTPATIENT)
Age: 79
End: 2019-04-01

## 2019-04-03 ENCOUNTER — TRANSCRIPTION ENCOUNTER (OUTPATIENT)
Age: 79
End: 2019-04-03

## 2019-04-03 LAB
M TB IFN-G BLD-IMP: NEGATIVE
QUANTIFERON TB PLUS MITOGEN MINUS NIL: 9.13 IU/ML
QUANTIFERON TB PLUS NIL: 0.03 IU/ML
QUANTIFERON TB PLUS TB1 MINUS NIL: -0.01 IU/ML
QUANTIFERON TB PLUS TB2 MINUS NIL: 0 IU/ML

## 2019-04-11 RX ORDER — RITUXIMAB 10 MG/ML
500 INJECTION, SOLUTION INTRAVENOUS
Qty: 0 | Refills: 0 | Status: COMPLETED | OUTPATIENT
Start: 2019-04-11 | End: 1900-01-01

## 2019-04-11 RX ORDER — CETIRIZINE HYDROCHLORIDE 10 MG/1
10 TABLET, COATED ORAL
Qty: 0 | Refills: 0 | Status: COMPLETED | OUTPATIENT
Start: 2019-04-11 | End: 1900-01-01

## 2019-04-11 RX ORDER — ACETAMINOPHEN 325 MG/1
325 TABLET ORAL
Qty: 0 | Refills: 0 | Status: COMPLETED | OUTPATIENT
Start: 2019-04-11 | End: 1900-01-01

## 2019-04-11 RX ORDER — METHYLPREDNISOLONE 125 MG/2ML
125 INJECTION, POWDER, LYOPHILIZED, FOR SOLUTION INTRAMUSCULAR; INTRAVENOUS
Qty: 0 | Refills: 0 | Status: COMPLETED | OUTPATIENT
Start: 2019-04-11 | End: 1900-01-01

## 2019-04-11 RX ADMIN — ACETAMINOPHEN 2 MG: 500 TABLET, FILM COATED ORAL at 00:00

## 2019-04-11 RX ADMIN — RITUXIMAB 1000 MG/50ML: 10 INJECTION, SOLUTION INTRAVENOUS at 00:00

## 2019-04-11 RX ADMIN — CETIRIZINE HYDROCHLORIDE 1 MG: 10 TABLET, FILM COATED ORAL at 00:00

## 2019-04-11 RX ADMIN — ACETAMINOPHEN 2 MG: 325 TABLET ORAL at 00:00

## 2019-04-11 RX ADMIN — METHYLPREDNISOLONE 1 MG: 125 INJECTION, POWDER, LYOPHILIZED, FOR SOLUTION INTRAMUSCULAR; INTRAVENOUS at 00:00

## 2019-04-15 ENCOUNTER — APPOINTMENT (OUTPATIENT)
Dept: RHEUMATOLOGY | Facility: CLINIC | Age: 79
End: 2019-04-15
Payer: MEDICARE

## 2019-04-15 VITALS
SYSTOLIC BLOOD PRESSURE: 125 MMHG | DIASTOLIC BLOOD PRESSURE: 79 MMHG | RESPIRATION RATE: 15 BRPM | OXYGEN SATURATION: 97 % | HEART RATE: 80 BPM | TEMPERATURE: 98.1 F

## 2019-04-15 VITALS
SYSTOLIC BLOOD PRESSURE: 115 MMHG | HEART RATE: 90 BPM | TEMPERATURE: 98.2 F | DIASTOLIC BLOOD PRESSURE: 78 MMHG | OXYGEN SATURATION: 95 % | RESPIRATION RATE: 14 BRPM

## 2019-04-15 VITALS
OXYGEN SATURATION: 94 % | TEMPERATURE: 98.1 F | SYSTOLIC BLOOD PRESSURE: 113 MMHG | RESPIRATION RATE: 15 BRPM | HEART RATE: 95 BPM | DIASTOLIC BLOOD PRESSURE: 74 MMHG

## 2019-04-15 VITALS
TEMPERATURE: 98.3 F | HEART RATE: 100 BPM | OXYGEN SATURATION: 97 % | SYSTOLIC BLOOD PRESSURE: 118 MMHG | RESPIRATION RATE: 15 BRPM | DIASTOLIC BLOOD PRESSURE: 75 MMHG

## 2019-04-15 VITALS
DIASTOLIC BLOOD PRESSURE: 79 MMHG | RESPIRATION RATE: 15 BRPM | SYSTOLIC BLOOD PRESSURE: 124 MMHG | OXYGEN SATURATION: 97 % | HEART RATE: 88 BPM

## 2019-04-15 VITALS
HEART RATE: 92 BPM | SYSTOLIC BLOOD PRESSURE: 131 MMHG | DIASTOLIC BLOOD PRESSURE: 76 MMHG | TEMPERATURE: 98.4 F | OXYGEN SATURATION: 96 % | RESPIRATION RATE: 15 BRPM

## 2019-04-15 PROCEDURE — 96375 TX/PRO/DX INJ NEW DRUG ADDON: CPT

## 2019-04-15 PROCEDURE — 96415 CHEMO IV INFUSION ADDL HR: CPT

## 2019-04-15 PROCEDURE — 96413 CHEMO IV INFUSION 1 HR: CPT

## 2019-04-15 RX ORDER — CETIRIZINE HYDROCHLORIDE 10 MG/1
10 TABLET, COATED ORAL
Qty: 0 | Refills: 0 | Status: COMPLETED | OUTPATIENT
Start: 2019-04-11

## 2019-04-15 RX ORDER — RITUXIMAB 10 MG/ML
500 INJECTION, SOLUTION INTRAVENOUS
Qty: 0 | Refills: 0 | Status: COMPLETED | OUTPATIENT
Start: 2019-04-11

## 2019-04-15 RX ORDER — METHYLPREDNISOLONE 125 MG/2ML
125 INJECTION, POWDER, LYOPHILIZED, FOR SOLUTION INTRAMUSCULAR; INTRAVENOUS
Qty: 0 | Refills: 0 | Status: COMPLETED | OUTPATIENT
Start: 2019-04-11

## 2019-04-15 RX ORDER — ACETAMINOPHEN 325 MG/1
325 TABLET ORAL
Qty: 0 | Refills: 0 | Status: COMPLETED | OUTPATIENT
Start: 2019-04-11

## 2019-04-17 ENCOUNTER — MEDICATION RENEWAL (OUTPATIENT)
Age: 79
End: 2019-04-17

## 2019-04-18 ENCOUNTER — APPOINTMENT (OUTPATIENT)
Dept: RHEUMATOLOGY | Facility: CLINIC | Age: 79
End: 2019-04-18

## 2019-04-23 RX ADMIN — CETIRIZINE HYDROCHLORIDE 1 MG: 10 TABLET, FILM COATED ORAL at 00:00

## 2019-04-23 RX ADMIN — ACETAMINOPHEN 2 MG: 500 TABLET, FILM COATED ORAL at 00:00

## 2019-04-23 RX ADMIN — METHYLPREDNISOLONE 1 MG: 125 INJECTION, POWDER, LYOPHILIZED, FOR SOLUTION INTRAMUSCULAR; INTRAVENOUS at 00:00

## 2019-04-29 ENCOUNTER — APPOINTMENT (OUTPATIENT)
Dept: RHEUMATOLOGY | Facility: CLINIC | Age: 79
End: 2019-04-29
Payer: MEDICARE

## 2019-04-29 VITALS
RESPIRATION RATE: 15 BRPM | SYSTOLIC BLOOD PRESSURE: 125 MMHG | TEMPERATURE: 98.4 F | OXYGEN SATURATION: 96 % | DIASTOLIC BLOOD PRESSURE: 78 MMHG | HEART RATE: 89 BPM

## 2019-04-29 VITALS
HEART RATE: 88 BPM | SYSTOLIC BLOOD PRESSURE: 128 MMHG | RESPIRATION RATE: 15 BRPM | OXYGEN SATURATION: 98 % | DIASTOLIC BLOOD PRESSURE: 77 MMHG

## 2019-04-29 VITALS
OXYGEN SATURATION: 99 % | RESPIRATION RATE: 15 BRPM | TEMPERATURE: 97.6 F | SYSTOLIC BLOOD PRESSURE: 108 MMHG | HEART RATE: 87 BPM | DIASTOLIC BLOOD PRESSURE: 67 MMHG

## 2019-04-29 VITALS
RESPIRATION RATE: 15 BRPM | SYSTOLIC BLOOD PRESSURE: 101 MMHG | OXYGEN SATURATION: 96 % | HEART RATE: 95 BPM | TEMPERATURE: 98 F | DIASTOLIC BLOOD PRESSURE: 61 MMHG

## 2019-04-29 PROCEDURE — 96413 CHEMO IV INFUSION 1 HR: CPT

## 2019-04-29 PROCEDURE — 96415 CHEMO IV INFUSION ADDL HR: CPT

## 2019-04-29 PROCEDURE — 96375 TX/PRO/DX INJ NEW DRUG ADDON: CPT

## 2019-04-29 RX ORDER — RITUXIMAB 10 MG/ML
500 INJECTION, SOLUTION INTRAVENOUS
Qty: 0 | Refills: 0 | Status: COMPLETED | OUTPATIENT
Start: 2019-04-11

## 2019-04-29 RX ORDER — METHYLPREDNISOLONE 125 MG/2ML
125 INJECTION, POWDER, LYOPHILIZED, FOR SOLUTION INTRAMUSCULAR; INTRAVENOUS
Qty: 0 | Refills: 0 | Status: COMPLETED | OUTPATIENT
Start: 2019-04-23

## 2019-04-29 RX ORDER — CETIRIZINE HYDROCHLORIDE 10 MG/1
10 TABLET, COATED ORAL
Qty: 0 | Refills: 0 | Status: COMPLETED | OUTPATIENT
Start: 2019-04-23

## 2019-04-29 RX ORDER — ACETAMINOPHEN 325 MG/1
325 TABLET ORAL
Qty: 0 | Refills: 0 | Status: COMPLETED | OUTPATIENT
Start: 2019-04-23

## 2019-05-09 ENCOUNTER — APPOINTMENT (OUTPATIENT)
Dept: RHEUMATOLOGY | Facility: CLINIC | Age: 79
End: 2019-05-09
Payer: MEDICARE

## 2019-05-09 VITALS
HEART RATE: 98 BPM | WEIGHT: 130 LBS | HEIGHT: 63 IN | BODY MASS INDEX: 23.04 KG/M2 | SYSTOLIC BLOOD PRESSURE: 138 MMHG | OXYGEN SATURATION: 98 % | DIASTOLIC BLOOD PRESSURE: 87 MMHG

## 2019-05-09 PROCEDURE — 99214 OFFICE O/P EST MOD 30 MIN: CPT

## 2019-05-09 RX ORDER — FOLIC ACID 1 MG/1
1 TABLET ORAL DAILY
Qty: 180 | Refills: 0 | Status: DISCONTINUED | COMMUNITY
Start: 2018-05-07 | End: 2019-05-09

## 2019-05-09 RX ORDER — METHOTREXATE 25 MG/ML
50 INJECTION, SOLUTION INTRA-ARTERIAL; INTRAMUSCULAR; INTRAVENOUS
Qty: 2.4 | Refills: 2 | Status: DISCONTINUED | COMMUNITY
Start: 2018-05-07 | End: 2019-05-09

## 2019-05-09 RX ORDER — OMEPRAZOLE 40 MG/1
40 CAPSULE, DELAYED RELEASE ORAL
Refills: 0 | Status: DISCONTINUED | COMMUNITY
End: 2019-05-09

## 2019-05-09 NOTE — ASSESSMENT
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 She is currently off prednisone. She is currently improved overall with regards to her RA and knee OA.  \par \par Plan:\par RA/+RNP: overall stable. \par -continue plaquenil 200mg BID for seronegative RA/+RNP. G6PD is WNL. She goes to her eye doctor for monitoring q6 months. \par -Off MTX and folic acid due to intolerance. \par -remain off prednisone\par -S/p Rituxan 4/15 and 4/29/2019.  Rituxan 1000mg q 14 days x 2. Potential risks of rituxan including but not limited to infections, some serious, infusion reactions, cardiovascular effects, and allergic reactions were discussed.\par -Check CD19\par -XR chest 9/2015 reviewed, biapical pleural thickening seen. She did f/u with Dr. Walls. Had PFTs, stable and no evidence of restrictive or obstructive lung disease. \par -She has hx of smoking, distant. She is up to date with PFTs/ECHO. Advised continued f/u with Pulm. \par -She will need routine screening with PFTs and ECHO. \par -prev declined amlodipine for raynaud's. To continue to use conservative measures for hand warming. \par -flu shot given 10/12/18\par \par Left shoulder pain/impingement\par -80mg depomedrol administered to left SAB 12/4/18- didn't help too much\par -Cont PT left shoulder\par \par \par Knee OA- doing well today\par -Right knee injected with 80mg depomedrol 8/2/18. \par \par Vit D deficiency-improved\par -cont Vit D is 24.2. Continue Vit D supplementation\par -Check Vit D with next labs.  \par \par Labs 5/29/19\par f/u 3 months

## 2019-05-09 NOTE — HISTORY OF PRESENT ILLNESS
[FreeTextEntry1] : 77 y/o woman with hx of GERD, COPD/asthmatic component, borderline elevated HTN, HLD on crestor, degenerative disc disease of spine, and on adderall for paralytic sleep and attention deficit, here for f/u of her inflammatory arthritis. I am treating her for seronegative RA/with +RNP/-GABBY. She is on plaquenil 200mg BID and Rituxan, recently completed first round.  \par \par Today she feels much better compared to last visit.  Her pain is 2/10 in intensity with 5 minutes of AM stiffness.  It is primarily in her left shoulder.  She is doing PT.  She denies any significant pain in her stomach, ever since she stopped the MTX.  She is glad she is off of it.  She denies any adverse reactions with the infusion.  \par She is off steroid.  \par \par She has some occ SOB, her pulmonologist is Dr. Walls.  She last saw him in 2/2019. \par \par Her knees b/L are overall doing well.  She had good response to CS injection.  \par \par Has dry eye, dry mouth.  Doesn't want sialogogue. She uses biotene which she feels has helped.  \par Has not had any Raynaud's.

## 2019-06-11 ENCOUNTER — RX RENEWAL (OUTPATIENT)
Age: 79
End: 2019-06-11

## 2019-06-13 ENCOUNTER — FORM ENCOUNTER (OUTPATIENT)
Age: 79
End: 2019-06-13

## 2019-06-14 ENCOUNTER — APPOINTMENT (OUTPATIENT)
Dept: ULTRASOUND IMAGING | Facility: CLINIC | Age: 79
End: 2019-06-14
Payer: MEDICARE

## 2019-06-14 ENCOUNTER — OUTPATIENT (OUTPATIENT)
Dept: OUTPATIENT SERVICES | Facility: HOSPITAL | Age: 79
LOS: 1 days | End: 2019-06-14
Payer: MEDICARE

## 2019-06-14 DIAGNOSIS — Z00.8 ENCOUNTER FOR OTHER GENERAL EXAMINATION: ICD-10-CM

## 2019-06-14 PROCEDURE — 76700 US EXAM ABDOM COMPLETE: CPT

## 2019-06-14 PROCEDURE — 76700 US EXAM ABDOM COMPLETE: CPT | Mod: 26

## 2019-06-20 ENCOUNTER — MEDICATION RENEWAL (OUTPATIENT)
Age: 79
End: 2019-06-20

## 2019-07-09 ENCOUNTER — RX RENEWAL (OUTPATIENT)
Age: 79
End: 2019-07-09

## 2019-08-08 ENCOUNTER — APPOINTMENT (OUTPATIENT)
Dept: RHEUMATOLOGY | Facility: CLINIC | Age: 79
End: 2019-08-08
Payer: MEDICARE

## 2019-08-08 LAB
25(OH)D3 SERPL-MCNC: 23.8 NG/ML
ALBUMIN SERPL ELPH-MCNC: 4.1 G/DL
ALP BLD-CCNC: 108 U/L
ALT SERPL-CCNC: 14 U/L
ANION GAP SERPL CALC-SCNC: 12 MMOL/L
AST SERPL-CCNC: 21 U/L
BASOPHILS # BLD AUTO: 0.1 K/UL
BASOPHILS NFR BLD AUTO: 1.2 %
BILIRUB SERPL-MCNC: 0.2 MG/DL
BUN SERPL-MCNC: 23 MG/DL
CALCIUM SERPL-MCNC: 9.1 MG/DL
CD19 CELLS NFR BLD: 1 /UL
CHLORIDE SERPL-SCNC: 102 MMOL/L
CO2 SERPL-SCNC: 24 MMOL/L
CREAT SERPL-MCNC: 0.82 MG/DL
CRP SERPL-MCNC: 0.28 MG/DL
EOSINOPHIL # BLD AUTO: 0.49 K/UL
EOSINOPHIL NFR BLD AUTO: 5.8 %
ERYTHROCYTE [SEDIMENTATION RATE] IN BLOOD BY WESTERGREN METHOD: 21 MM/HR
GLUCOSE SERPL-MCNC: 95 MG/DL
HCT VFR BLD CALC: 40.5 %
HGB BLD-MCNC: 12.8 G/DL
IMM GRANULOCYTES NFR BLD AUTO: 0.4 %
LYMPHOCYTES # BLD AUTO: 2 K/UL
LYMPHOCYTES NFR BLD AUTO: 23.7 %
MAN DIFF?: NORMAL
MCHC RBC-ENTMCNC: 29.3 PG
MCHC RBC-ENTMCNC: 31.6 GM/DL
MCV RBC AUTO: 92.7 FL
MONOCYTES # BLD AUTO: 0.65 K/UL
MONOCYTES NFR BLD AUTO: 7.7 %
NEUTROPHILS # BLD AUTO: 5.18 K/UL
NEUTROPHILS NFR BLD AUTO: 61.2 %
PLATELET # BLD AUTO: 261 K/UL
POTASSIUM SERPL-SCNC: 4.4 MMOL/L
PROT SERPL-MCNC: 6.7 G/DL
RBC # BLD: 4.37 M/UL
RBC # FLD: 13.3 %
SODIUM SERPL-SCNC: 138 MMOL/L
WBC # FLD AUTO: 8.45 K/UL

## 2019-08-08 PROCEDURE — 20610 DRAIN/INJ JOINT/BURSA W/O US: CPT | Mod: RT

## 2019-08-08 PROCEDURE — 99214 OFFICE O/P EST MOD 30 MIN: CPT | Mod: 25

## 2019-08-08 RX ORDER — METHYLPRED ACET/NACL,ISO-OS/PF 80 MG/ML
80 VIAL (ML) INJECTION
Qty: 1 | Refills: 0 | Status: COMPLETED | OUTPATIENT
Start: 2019-08-08

## 2019-08-08 RX ADMIN — METHYLPREDNISOLONE ACETATE 0 MG/ML: 80 INJECTION, SUSPENSION INTRA-ARTICULAR; INTRALESIONAL; INTRAMUSCULAR; SOFT TISSUE at 00:00

## 2019-08-08 NOTE — HISTORY OF PRESENT ILLNESS
[FreeTextEntry1] : 79 y/o woman with hx of GERD, COPD/asthmatic component, borderline elevated HTN, HLD on crestor, degenerative disc disease of spine, and on adderall for paralytic sleep and attention deficit, here for f/u of her inflammatory arthritis. I am treating her for seronegative RA/with +RNP/-GABBY. She is on plaquenil 200mg BID and Rituxan, recently completed first round. \par \par Her pain is 5/10 in intensity.  She has knee pain/swelling b/L.  \par She fell off a chair and hurt her left shoulder/hand.  She went to Dr. Smith and was Rx'd PT.  \par She feels some pain in b/L 1st MCPs and a few MCPs.  \par \par \par She denies any recent fevers, chills, infections, other than being told by her son who is a PA that she has poison ivy.   \par \par +dry mouth\par +dry eye\par no rashes, no personal or known fam hx of psoriasis.\par \par \par \par Labs 8/7/19\par CBC, CMP, CRP unremarakble\par ESR 21\par Vit D 23.8\par \par \par Cancer screening:\par CT chest in 10/30/17: b/L stable nodules\par Last mammogram was 12/29/17: negative. \par EGD 1/19/18: Erosive gastritis\par Colonoscopy 1/19/18: normal. \par  \par

## 2019-08-08 NOTE — PROCEDURE
[FreeTextEntry1] : \par Procedure: Right knee injection with depomedrol 80mg\par Date: 08/08/2019\par Indications: therapeutic purposes \par #1 site identified in the .      \par \par The procedure risks was discussed with the patient.\par Indications: therapeutic purposes \par #1 site identified in the Right knee . Verbal consent was obtained. \par Anesthesia was with ethyl chloride.\par The patient was prepped with betadine solution. \par A 25 gauge 1.5 inch needle was used.\par Injectables: Depomedrol 80 mg was injected into the site The Depomedrol was mixed with 1mL of xylocaine 1%. \par The patient tolerated the procedure well..\par There were no complications. Handouts/patient instructions were given to patient . patient was instructed to call if redness at site, a decrease in range of motion or an increase in pain is noted after procedure. \par  \par

## 2019-08-08 NOTE — ASSESSMENT
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 She is currently off prednisone. She is currently improved overall with regards to her RA and knee OA. \par \par Plan:\par RA/+RNP: overall stable. \par -continue plaquenil 200mg BID for seronegative RA/+RNP. G6PD is WNL. She goes to her eye doctor for monitoring q6 months. \par -Off MTX and folic acid due to intolerance. \par -remain off prednisone\par -S/p Rituxan 4/15 and 4/29/2019. Rituxan 1000mg q 14 days x 2. Potential risks of rituxan including but not limited to infections, some serious, infusion reactions, cardiovascular effects, and allergic reactions were discussed.\par -f/u CD19 level.  Patient may need Rituxan sooner than 6 months.  \par -XR chest 9/2015 reviewed, biapical pleural thickening seen. She did f/u with Dr. Walls. Had PFTs, stable and no evidence of restrictive or obstructive lung disease. \par -She has hx of smoking, distant. She is up to date with PFTs/ECHO. Advised continued f/u with Pulm. \par -She will need routine screening with PFTs and ECHO. \par -prev declined amlodipine for raynaud's. To continue to use conservative measures for hand warming. \par -flu shot given 10/12/18.  Will need again in Fall 2019.  \par \par Left shoulder pain/impingement\par -80mg depomedrol administered to left SAB 12/4/18- didn't help too much\par -Completed shoulder PT\par \par \par Knee OA- doing well today\par -XR right knee\par -Right knee injected with 80mg depomedrol 8/2/18. Will inject again today 8/8/2019.\par -Plan for Supartz\par \par Vit D deficiency-\par -Vit D is 23.8 Continue Vit D supplementation.  Admits to partial compliance.  \par -Check Vit D with next labs in 3 months\par \par \par Oral thrush\par -Rx nystatin swish and spit\par \par Labs reviewed, Repeat ESR/CRP in 6 weeks.\par \par f/u 7 weeks.  \par

## 2019-08-15 ENCOUNTER — APPOINTMENT (OUTPATIENT)
Dept: RHEUMATOLOGY | Facility: CLINIC | Age: 79
End: 2019-08-15
Payer: MEDICARE

## 2019-08-15 VITALS — HEIGHT: 63 IN | BODY MASS INDEX: 23.04 KG/M2 | WEIGHT: 130 LBS

## 2019-08-15 PROCEDURE — 20610 DRAIN/INJ JOINT/BURSA W/O US: CPT | Mod: LT

## 2019-08-15 PROCEDURE — 99213 OFFICE O/P EST LOW 20 MIN: CPT | Mod: 25

## 2019-08-15 RX ORDER — METHYLPRED ACET/NACL,ISO-OS/PF 80 MG/ML
80 VIAL (ML) INJECTION
Qty: 1 | Refills: 0 | Status: COMPLETED | OUTPATIENT
Start: 2019-08-15

## 2019-08-15 RX ADMIN — METHYLPREDNISOLONE ACETATE 0 MG/ML: 80 INJECTION, SUSPENSION INTRA-ARTICULAR; INTRALESIONAL; INTRAMUSCULAR; SOFT TISSUE at 00:00

## 2019-08-15 NOTE — HISTORY OF PRESENT ILLNESS
[FreeTextEntry1] : Patient requested urgent visit.  Had right knee injection last visit.  It feels better after 2 days.  \par She developed left knee pain/swelling in the interim.  Had difficulty walking.  Wanted to have left side injected.\par \par Patient still interested in HA injections.  Hasn't gone for right knee XR yet, wants to go for left too.  \par \par Reviewed CD19 results with patient.  Plan to keep rituxan at 6 month interval as CD19 level was 1.

## 2019-08-15 NOTE — PROCEDURE
[FreeTextEntry1] : \par Procedure: Left knee injection with depomedrol 80mg\par Date: 08/15/2019\par Indications: therapeutic purposes \par #1 site identified in the . \par \par The procedure risks was discussed with the patient.\par Indications: therapeutic purposes \par #1 site identified in the left knee . Verbal consent was obtained. \par Anesthesia was with ethyl chloride.\par The patient was prepped with betadine solution. \par A 25 gauge 1.5 inch needle was used.\par Injectables: Depomedrol 80 mg was injected into the site The Depomedrol was mixed with 1mL of xylocaine 1%. \par The patient tolerated the procedure well..\par There were no complications. Handouts/patient instructions were given to patient . patient was instructed to call if redness at site, a decrease in range of motion or an increase in pain is noted after procedure. \par

## 2019-08-15 NOTE — ASSESSMENT
[FreeTextEntry1] : Inject left knee with 80mg depomedrol today\par XRs b/L knees, plan for supartz injections.  \par Informed patient of CD19 results with plan to keep Rituxan at 6 month megan for now.  Patient agreeable\par F/u regularly scheduled visit.  \par

## 2019-08-19 ENCOUNTER — FORM ENCOUNTER (OUTPATIENT)
Age: 79
End: 2019-08-19

## 2019-08-20 ENCOUNTER — APPOINTMENT (OUTPATIENT)
Dept: RADIOLOGY | Facility: CLINIC | Age: 79
End: 2019-08-20
Payer: MEDICARE

## 2019-08-20 ENCOUNTER — OUTPATIENT (OUTPATIENT)
Dept: OUTPATIENT SERVICES | Facility: HOSPITAL | Age: 79
LOS: 1 days | End: 2019-08-20
Payer: MEDICARE

## 2019-08-20 DIAGNOSIS — M06.00 RHEUMATOID ARTHRITIS WITHOUT RHEUMATOID FACTOR, UNSPECIFIED SITE: ICD-10-CM

## 2019-08-20 PROCEDURE — 73564 X-RAY EXAM KNEE 4 OR MORE: CPT | Mod: 26,50

## 2019-08-20 PROCEDURE — 73564 X-RAY EXAM KNEE 4 OR MORE: CPT

## 2019-08-28 ENCOUNTER — OTHER (OUTPATIENT)
Age: 79
End: 2019-08-28

## 2019-09-06 ENCOUNTER — APPOINTMENT (OUTPATIENT)
Dept: RHEUMATOLOGY | Facility: CLINIC | Age: 79
End: 2019-09-06
Payer: MEDICARE

## 2019-09-06 VITALS
SYSTOLIC BLOOD PRESSURE: 116 MMHG | BODY MASS INDEX: 23.04 KG/M2 | HEIGHT: 63 IN | HEART RATE: 88 BPM | WEIGHT: 130 LBS | OXYGEN SATURATION: 98 % | DIASTOLIC BLOOD PRESSURE: 78 MMHG | RESPIRATION RATE: 14 BRPM

## 2019-09-06 PROCEDURE — 99213 OFFICE O/P EST LOW 20 MIN: CPT | Mod: 25

## 2019-09-06 PROCEDURE — 20610 DRAIN/INJ JOINT/BURSA W/O US: CPT | Mod: 50

## 2019-09-06 RX ORDER — SODIUM HYALURONATE INTRA-ARTICULAR SOLN PREF SYR 25 MG/2.5ML 25/2.5 MG/ML
25 SOLUTION PREFILLED SYRINGE INTRAARTICULAR
Refills: 0 | Status: COMPLETED | OUTPATIENT
Start: 2019-09-06

## 2019-09-06 RX ADMIN — SODIUM HYALURONATE INTRA-ARTICULAR SOLN PREF SYR 25 MG/2.5ML 0 MG/2.5ML: 25/2.5 SOLUTION PREFILLED SYRINGE at 00:00

## 2019-09-06 NOTE — HISTORY OF PRESENT ILLNESS
[FreeTextEntry1] : Patient is here for urgent visit.  She stepped in a hole with her left foot, and now her left knee is severely painful.  She went to orthopedic at [facility] and [facility], was given a knee brace and script for PT.  She was also given naproxen 500mg BID which she states takes the edge off, but she still cannot bear weight without the brace.  She would like to have her HA injection series starting today.  She is also asking if she needs an MRI for her left knee.

## 2019-09-06 NOTE — PROCEDURE
[FreeTextEntry1] : Procedure: Left knee injection with Supartz 25mg\par Date: 09/06/2019\par Indications: therapeutic purposes \par #1 site identified in the left knee.\par \par The procedure risks was discussed with the patient.\par Indications: therapeutic purposes \par #1 site identified in the left knee . Verbal consent was obtained. \par Anesthesia was with ethyl chloride.\par The patient was prepped with betadine solution. \par A 21 gauge 1.5 inch needle was used.\par Injectables:  1mL of xylocaine 1% for local anesthetic.  Injected 25mg Supartz to left knee.  \par The patient tolerated the procedure well.  No fluid was aspirated.  \par There were no complications. Handouts/patient instructions were given to patient . patient was instructed to call if redness at site, a decrease in range of motion or an increase in pain is noted after procedure. \par \par  \par #2: Identical procedure performed for right knee.

## 2019-09-06 NOTE — ASSESSMENT
[FreeTextEntry1] : b/L supartz injection performed today\par Advised PT\par Agree with temporary 12 hour brace\par MRI left knee requisition given if still persistent pain.  May be structural damage/meniscal tear.  \par f/u 1 week for 2nd of 3 b/L supartz injections.

## 2019-09-11 ENCOUNTER — FORM ENCOUNTER (OUTPATIENT)
Age: 79
End: 2019-09-11

## 2019-09-12 ENCOUNTER — APPOINTMENT (OUTPATIENT)
Dept: MRI IMAGING | Facility: CLINIC | Age: 79
End: 2019-09-12
Payer: MEDICARE

## 2019-09-12 ENCOUNTER — OUTPATIENT (OUTPATIENT)
Dept: OUTPATIENT SERVICES | Facility: HOSPITAL | Age: 79
LOS: 1 days | End: 2019-09-12
Payer: MEDICARE

## 2019-09-12 DIAGNOSIS — Z00.8 ENCOUNTER FOR OTHER GENERAL EXAMINATION: ICD-10-CM

## 2019-09-12 PROCEDURE — 73721 MRI JNT OF LWR EXTRE W/O DYE: CPT | Mod: 26,LT

## 2019-09-12 PROCEDURE — 73721 MRI JNT OF LWR EXTRE W/O DYE: CPT

## 2019-09-13 ENCOUNTER — APPOINTMENT (OUTPATIENT)
Dept: RHEUMATOLOGY | Facility: CLINIC | Age: 79
End: 2019-09-13
Payer: MEDICARE

## 2019-09-13 VITALS
HEIGHT: 63 IN | BODY MASS INDEX: 23.04 KG/M2 | WEIGHT: 130 LBS | HEART RATE: 98 BPM | SYSTOLIC BLOOD PRESSURE: 140 MMHG | DIASTOLIC BLOOD PRESSURE: 90 MMHG | OXYGEN SATURATION: 98 %

## 2019-09-13 PROCEDURE — 20610 DRAIN/INJ JOINT/BURSA W/O US: CPT | Mod: 50

## 2019-09-13 RX ORDER — SODIUM HYALURONATE INTRA-ARTICULAR SOLN PREF SYR 25 MG/2.5ML 25/2.5 MG/ML
25 SOLUTION PREFILLED SYRINGE INTRAARTICULAR
Refills: 0 | Status: COMPLETED | OUTPATIENT
Start: 2019-09-13

## 2019-09-13 RX ADMIN — SODIUM HYALURONATE INTRA-ARTICULAR SOLN PREF SYR 25 MG/2.5ML 0 MG/2.5ML: 25/2.5 SOLUTION PREFILLED SYRINGE at 00:00

## 2019-09-13 NOTE — PROCEDURE
[FreeTextEntry1] : Procedure: Left knee injection with Supartz 25mg\par Date: 09/013/2019\par Indications: therapeutic purposes \par #1 site identified in the left knee.\par \par The procedure risks was discussed with the patient.\par Indications: therapeutic purposes \par #1 site identified in the left knee . Verbal consent was obtained. \par Anesthesia was with ethyl chloride.\par The patient was prepped with betadine solution. \par A 21 gauge 1.5 inch needle was used.\par Injectables: 1mL of xylocaine 1% for local anesthetic. Injected 25mg Supartz to left knee. \par The patient tolerated the procedure well. No fluid was aspirated. \par There were no complications. Handouts/patient instructions were given to patient . patient was instructed to call if redness at site, a decrease in range of motion or an increase in pain is noted after procedure. \par \par  \par #2: Identical procedure performed for right knee. \par

## 2019-09-16 ENCOUNTER — FORM ENCOUNTER (OUTPATIENT)
Age: 79
End: 2019-09-16

## 2019-09-16 RX ORDER — METHYLPREDNISOLONE 125 MG/2ML
125 INJECTION, POWDER, LYOPHILIZED, FOR SOLUTION INTRAMUSCULAR; INTRAVENOUS
Qty: 0 | Refills: 0 | Status: COMPLETED | OUTPATIENT
Start: 2019-09-16 | End: 1900-01-01

## 2019-09-16 RX ORDER — ACETAMINOPHEN 325 MG/1
325 TABLET ORAL
Qty: 0 | Refills: 0 | Status: COMPLETED | OUTPATIENT
Start: 2019-09-16 | End: 1900-01-01

## 2019-09-16 RX ORDER — CETIRIZINE HYDROCHLORIDE 10 MG/1
10 TABLET, COATED ORAL
Qty: 0 | Refills: 0 | Status: COMPLETED | OUTPATIENT
Start: 2019-09-16 | End: 1900-01-01

## 2019-09-17 ENCOUNTER — APPOINTMENT (OUTPATIENT)
Dept: ULTRASOUND IMAGING | Facility: CLINIC | Age: 79
End: 2019-09-17
Payer: MEDICARE

## 2019-09-17 ENCOUNTER — OUTPATIENT (OUTPATIENT)
Dept: OUTPATIENT SERVICES | Facility: HOSPITAL | Age: 79
LOS: 1 days | End: 2019-09-17
Payer: MEDICARE

## 2019-09-17 DIAGNOSIS — Z00.8 ENCOUNTER FOR OTHER GENERAL EXAMINATION: ICD-10-CM

## 2019-09-17 DIAGNOSIS — M79.605 PAIN IN LEFT LEG: ICD-10-CM

## 2019-09-17 PROCEDURE — 93971 EXTREMITY STUDY: CPT | Mod: 26,LT

## 2019-09-17 PROCEDURE — 93971 EXTREMITY STUDY: CPT

## 2019-09-20 ENCOUNTER — APPOINTMENT (OUTPATIENT)
Dept: RHEUMATOLOGY | Facility: CLINIC | Age: 79
End: 2019-09-20
Payer: MEDICARE

## 2019-09-20 VITALS
WEIGHT: 130 LBS | DIASTOLIC BLOOD PRESSURE: 78 MMHG | SYSTOLIC BLOOD PRESSURE: 136 MMHG | HEART RATE: 86 BPM | HEIGHT: 63 IN | OXYGEN SATURATION: 97 % | BODY MASS INDEX: 23.04 KG/M2

## 2019-09-20 PROCEDURE — 20610 DRAIN/INJ JOINT/BURSA W/O US: CPT | Mod: RT

## 2019-09-20 RX ORDER — SODIUM HYALURONATE INTRA-ARTICULAR SOLN PREF SYR 25 MG/2.5ML 25/2.5 MG/ML
25 SOLUTION PREFILLED SYRINGE INTRAARTICULAR
Refills: 0 | Status: COMPLETED | OUTPATIENT
Start: 2019-09-20

## 2019-09-20 RX ADMIN — SODIUM HYALURONATE INTRA-ARTICULAR SOLN PREF SYR 25 MG/2.5ML 0 MG/2.5ML: 25/2.5 SOLUTION PREFILLED SYRINGE at 00:00

## 2019-09-20 NOTE — ASSESSMENT
[FreeTextEntry1] : Completed 3rd of 3 Supartz injections to right knee.\par To get clearance from her orthopedic regarding completing the 3rd Supartz injection to left knee.

## 2019-09-20 NOTE — PROCEDURE
[FreeTextEntry1] : Procedure: Right knee injection with Supartz 25mg\par Date: 09/20/19\par Indications: therapeutic purposes \par #1 site identified in the right knee.\par \par The procedure risks was discussed with the patient.\par Indications: therapeutic purposes \par #1 site identified in the right knee . Verbal consent was obtained. \par Anesthesia was with ethyl chloride.\par The patient was prepped with betadine solution. \par A 21 gauge 1.5 inch needle was used.\par Injectables: 1mL of xylocaine 1% for local anesthetic. Injected 25mg Supartz to left knee. \par The patient tolerated the procedure well. No fluid was aspirated. \par There were no complications. Handouts/patient instructions were given to patient . patient was instructed to call if redness at site, a decrease in range of motion or an increase in pain is noted after procedure. \par \par  \par

## 2019-09-24 ENCOUNTER — APPOINTMENT (OUTPATIENT)
Dept: RHEUMATOLOGY | Facility: CLINIC | Age: 79
End: 2019-09-24
Payer: MEDICARE

## 2019-09-24 VITALS
HEART RATE: 94 BPM | WEIGHT: 130 LBS | SYSTOLIC BLOOD PRESSURE: 132 MMHG | BODY MASS INDEX: 23.04 KG/M2 | DIASTOLIC BLOOD PRESSURE: 76 MMHG | HEIGHT: 63 IN | OXYGEN SATURATION: 96 %

## 2019-09-24 PROCEDURE — 20610 DRAIN/INJ JOINT/BURSA W/O US: CPT | Mod: LT

## 2019-09-24 RX ORDER — SODIUM HYALURONATE INTRA-ARTICULAR SOLN PREF SYR 25 MG/2.5ML 25/2.5 MG/ML
25 SOLUTION PREFILLED SYRINGE INTRAARTICULAR
Refills: 0 | Status: COMPLETED | OUTPATIENT
Start: 2019-09-24

## 2019-09-24 RX ADMIN — SODIUM HYALURONATE INTRA-ARTICULAR SOLN PREF SYR 25 MG/2.5ML 0 MG/2.5ML: 25/2.5 SOLUTION PREFILLED SYRINGE at 00:00

## 2019-09-26 NOTE — PROCEDURE
[FreeTextEntry1] : Procedure: left knee injection with Supartz 25mg\par Date: 09/24/19\par Indications: therapeutic purposes \par #1 site identified in the left knee.\par The procedure risks was discussed with the patient.\par \par Anesthesia was with ethyl chloride.\par The patient was prepped with betadine solution. \par A 21 gauge 1.5 inch needle was used.\par Injectables: 1mL of xylocaine 1% for local anesthetic. Injected 25mg Supartz to left knee. \par The patient tolerated the procedure well. No fluid was aspirated. \par There were no complications. Handouts/patient instructions were given to patient . patient was instructed to call if redness at site, a decrease in range of motion or an increase in pain is noted after procedure. \par \par

## 2019-09-26 NOTE — ASSESSMENT
[FreeTextEntry1] : Patient had clearance with Dr. Vasquez to continue HA injections to left knee.  \par Patient would like to extend supartz injections to left knee to 5 total injections.  \par f/u in 1 week for 4th of 5 planned Supartz injection to left knee.  \par Completed 3/3 injections for right knee.

## 2019-10-02 LAB
ALBUMIN SERPL ELPH-MCNC: 4.3 G/DL
ALP BLD-CCNC: 123 U/L
ALT SERPL-CCNC: 16 U/L
ANION GAP SERPL CALC-SCNC: 15 MMOL/L
AST SERPL-CCNC: 19 U/L
BASOPHILS # BLD AUTO: 0.1 K/UL
BASOPHILS NFR BLD AUTO: 1.2 %
BILIRUB SERPL-MCNC: 0.3 MG/DL
BUN SERPL-MCNC: 28 MG/DL
CALCIUM SERPL-MCNC: 9.4 MG/DL
CHLORIDE SERPL-SCNC: 105 MMOL/L
CO2 SERPL-SCNC: 23 MMOL/L
CREAT SERPL-MCNC: 0.81 MG/DL
CRP SERPL-MCNC: 0.47 MG/DL
EOSINOPHIL # BLD AUTO: 0.24 K/UL
EOSINOPHIL NFR BLD AUTO: 2.8 %
ERYTHROCYTE [SEDIMENTATION RATE] IN BLOOD BY WESTERGREN METHOD: 24 MM/HR
GLUCOSE SERPL-MCNC: 91 MG/DL
HCT VFR BLD CALC: 41.9 %
HGB BLD-MCNC: 13.2 G/DL
IMM GRANULOCYTES NFR BLD AUTO: 0.6 %
LYMPHOCYTES # BLD AUTO: 1.64 K/UL
LYMPHOCYTES NFR BLD AUTO: 19.1 %
MAN DIFF?: NORMAL
MCHC RBC-ENTMCNC: 30.4 PG
MCHC RBC-ENTMCNC: 31.5 GM/DL
MCV RBC AUTO: 96.5 FL
MONOCYTES # BLD AUTO: 0.6 K/UL
MONOCYTES NFR BLD AUTO: 7 %
NEUTROPHILS # BLD AUTO: 5.95 K/UL
NEUTROPHILS NFR BLD AUTO: 69.3 %
PLATELET # BLD AUTO: 302 K/UL
POTASSIUM SERPL-SCNC: 4.7 MMOL/L
PROT SERPL-MCNC: 6.8 G/DL
RBC # BLD: 4.34 M/UL
RBC # FLD: 14.5 %
SODIUM SERPL-SCNC: 143 MMOL/L
WBC # FLD AUTO: 8.58 K/UL

## 2019-10-03 ENCOUNTER — APPOINTMENT (OUTPATIENT)
Dept: RHEUMATOLOGY | Facility: CLINIC | Age: 79
End: 2019-10-03
Payer: MEDICARE

## 2019-10-03 VITALS
OXYGEN SATURATION: 98 % | WEIGHT: 130 LBS | DIASTOLIC BLOOD PRESSURE: 76 MMHG | HEART RATE: 85 BPM | BODY MASS INDEX: 23.04 KG/M2 | SYSTOLIC BLOOD PRESSURE: 157 MMHG | HEIGHT: 63 IN

## 2019-10-03 PROCEDURE — G0008: CPT

## 2019-10-03 PROCEDURE — 90662 IIV NO PRSV INCREASED AG IM: CPT

## 2019-10-03 PROCEDURE — 99214 OFFICE O/P EST MOD 30 MIN: CPT | Mod: 25

## 2019-10-03 PROCEDURE — 20610 DRAIN/INJ JOINT/BURSA W/O US: CPT | Mod: LT

## 2019-10-03 RX ORDER — SODIUM HYALURONATE INTRA-ARTICULAR SOLN PREF SYR 25 MG/2.5ML 25/2.5 MG/ML
25 SOLUTION PREFILLED SYRINGE INTRAARTICULAR
Refills: 0 | Status: COMPLETED | OUTPATIENT
Start: 2019-10-03

## 2019-10-03 RX ADMIN — SODIUM HYALURONATE INTRA-ARTICULAR SOLN PREF SYR 25 MG/2.5ML 0 MG/2.5ML: 25/2.5 SOLUTION PREFILLED SYRINGE at 00:00

## 2019-10-03 NOTE — PROCEDURE
[FreeTextEntry1] : Procedure: left knee injection with Supartz 25mg\par Date: 10/3/19\par Indications: therapeutic purposes \par #1 site identified in the left knee.\par The procedure risks was discussed with the patient.\par \par Anesthesia was with ethyl chloride.\par The patient was prepped with betadine solution. \par A 21 gauge 1.5 inch needle was used.\par Injectables: 1mL of xylocaine 1% for local anesthetic. Injected 25mg Supartz to left knee. \par The patient tolerated the procedure well. No fluid was aspirated. \par There were no complications. Handouts/patient instructions were given to patient . patient was instructed to call if redness at site, a decrease in range of motion or an increase in pain is noted after procedure. \par \par Procedure #2\par Fluzone 0.5mL injected to left upper arm after alcohol prep. Patient tolerated injection well.\par \par

## 2019-10-03 NOTE — ASSESSMENT
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 She is currently off prednisone. She is currently improved overall with regards to her RA and knee OA. \par \par Plan:\par RA/+RNP: overall stable. \par -continue plaquenil 200mg BID for seronegative RA/+RNP. G6PD is WNL. She goes to her eye doctor for monitoring q6 months. \par -Off MTX and folic acid due to intolerance. \par -remain off prednisone\par -S/p Rituxan 4/15 and 4/29/2019. Next due 10/31/19.  Rituxan 1000mg q 14 days x 2. Potential risks of rituxan including but not limited to infections, some serious, infusion reactions, cardiovascular effects, and allergic reactions were discussed.\par -XR chest 9/2015 reviewed, biapical pleural thickening seen. She did f/u with Dr. Walls. Had PFTs, stable and no evidence of restrictive or obstructive lung disease. \par -She has hx of smoking, distant. She is up to date with PFTs/ECHO. Advised continued f/u with Pulm. \par -She will need routine screening with PFTs and ECHO. \par -prev declined amlodipine for raynaud's. To continue to use conservative measures for hand warming. \par -flu shot given today 10/3/19. \par \par Left shoulder pain/impingement\par -80mg depomedrol administered to left SAB 12/4/18- didn't help too much\par -Completed shoulder PT\par \par \par Knee OA- doing well today\par -Right knee injected with 80mg depomedrol 8/2/18. Will inject again today 8/8/2019.\par -Completed 3 injections of supartz in right knee, and 4/5 for left knee.  \par \par Vit D deficiency-\par -Vit D is 23.8 Continue Vit D supplementation. Admits to partial compliance. \par -Check Vit D with next labs\par \par \par Oral thrush- improved\par -Rx'd nystatin swish and spit\par \par \par f/u 8 weeks, labs before next visit.  \par  \par \par

## 2019-10-03 NOTE — HISTORY OF PRESENT ILLNESS
[FreeTextEntry1] : 77 y/o woman with hx of GERD, COPD/asthmatic component, borderline elevated HTN, HLD on crestor, degenerative disc disease of spine, and on adderall for paralytic sleep and attention deficit, here for f/u of her inflammatory arthritis. I am treating her for seronegative RA/with +RNP/-GABBY. She is on plaquenil 200mg BID and Rituxan. She is due for her next Rituxan in 10/2019.\par \par Her pain is 9/10 in intensity, particularly in the left knee which has the fracture.  She saw Dr. Vasquez, was advised a walker.  Left knee felt good after the last HA injection.  \par \par She reports that her hands are doing very well . She has no pain/issue with regards to hands/wrists.  \par \par She denies any recent fevers, chills, infections, \par She is having bladder botox done mid october.  \par \par +dry mouth\par +dry eye\par no rashes, no personal or known fam hx of psoriasis.\par \par 10/1/19 labs reviewed, slight uptrend in ESR/CRP.\par \par She is requesting flu shot today.  \par \par Cancer screening:\par CT chest in 10/30/17: b/L stable nodules\par Last mammogram was 12/29/17: negative. \par EGD 1/19/18: Erosive gastritis\par Colonoscopy 1/19/18: normal. \par  \par \par

## 2019-10-11 ENCOUNTER — APPOINTMENT (OUTPATIENT)
Dept: DERMATOLOGY | Facility: CLINIC | Age: 79
End: 2019-10-11
Payer: MEDICARE

## 2019-10-11 PROCEDURE — 99213 OFFICE O/P EST LOW 20 MIN: CPT

## 2019-10-11 RX ORDER — CLOTRIMAZOLE 10 MG/1
10 LOZENGE ORAL DAILY
Qty: 50 | Refills: 2 | Status: DISCONTINUED | COMMUNITY
Start: 2019-06-10 | End: 2019-10-11

## 2019-10-11 RX ORDER — NYSTATIN 100000 [USP'U]/ML
100000 SUSPENSION ORAL
Qty: 200 | Refills: 0 | Status: COMPLETED | COMMUNITY
Start: 2019-08-08 | End: 2019-10-11

## 2019-10-11 RX ORDER — ROPINIROLE 1 MG/1
1 TABLET, FILM COATED ORAL
Qty: 30 | Refills: 2 | Status: DISCONTINUED | COMMUNITY
Start: 2019-05-09 | End: 2019-10-11

## 2019-10-11 RX ORDER — PREDNISONE 5 MG/1
5 TABLET ORAL
Qty: 24 | Refills: 0 | Status: COMPLETED | COMMUNITY
Start: 2019-06-20 | End: 2019-10-11

## 2019-10-11 NOTE — REVIEW OF SYSTEMS
Normal thyroid peroxidase antibodies. Others pending. Given ongoing thyroid issues, endo referral may not be a bad idea.
tsh receptor ab normal.
[Negative] : Genitourinary

## 2019-10-11 NOTE — ASSESSMENT
[FreeTextEntry1] : Clari ZAIDI jaw angle\par benign\par discussed cosmetic Tx;  f/u for cosmetic D&C - IPP\par procedure, R/B explained

## 2019-10-11 NOTE — HISTORY OF PRESENT ILLNESS
[de-identified] : The patient has been fit in for urgent appointment. \par c/o growing lesion on R cheek;

## 2019-10-18 ENCOUNTER — APPOINTMENT (OUTPATIENT)
Dept: RHEUMATOLOGY | Facility: CLINIC | Age: 79
End: 2019-10-18
Payer: MEDICARE

## 2019-10-18 VITALS
SYSTOLIC BLOOD PRESSURE: 110 MMHG | BODY MASS INDEX: 23.04 KG/M2 | DIASTOLIC BLOOD PRESSURE: 80 MMHG | HEIGHT: 63 IN | HEART RATE: 99 BPM | OXYGEN SATURATION: 98 % | WEIGHT: 130 LBS

## 2019-10-18 PROCEDURE — 20610 DRAIN/INJ JOINT/BURSA W/O US: CPT | Mod: LT

## 2019-10-18 RX ORDER — SODIUM HYALURONATE INTRA-ARTICULAR SOLN PREF SYR 25 MG/2.5ML 25/2.5 MG/ML
25 SOLUTION PREFILLED SYRINGE INTRAARTICULAR
Refills: 0 | Status: COMPLETED | OUTPATIENT
Start: 2019-10-18

## 2019-10-18 RX ADMIN — SODIUM HYALURONATE INTRA-ARTICULAR SOLN PREF SYR 25 MG/2.5ML 0 MG/2.5ML: 25/2.5 SOLUTION PREFILLED SYRINGE at 00:00

## 2019-10-18 NOTE — PROCEDURE
[FreeTextEntry1] : Procedure: left knee injection with Supartz 25mg\par Date: 10/18/19\par Indications: therapeutic purposes \par #1 site identified in the left knee.\par The procedure risks was discussed with the patient.\par \par Anesthesia was with ethyl chloride.\par The patient was prepped with betadine solution. \par A 21 gauge 1.5 inch needle was used. \par Injectables: 1mL of xylocaine 1% for local anesthetic.  7 cc of clear yellow fluid was aspirated.   Injected 25mg Supartz to left knee. \par The patient tolerated the procedure well. \par There were no complications. Handouts/patient instructions were given to patient . patient was instructed to call if redness at site, a decrease in range of motion or an increase in pain is noted after procedure. \par \par Fluid sent for testing\par

## 2019-10-18 NOTE — ASSESSMENT
[FreeTextEntry1] : completed 5/5 left knee supartz injections.\par Left knee synovial fluid sent for testing.

## 2019-10-21 LAB
B PERT IGG+IGM PNL SER: CLEAR
COLOR FLD: YELLOW
EOSINOPHIL # FLD MANUAL: 0 %
FLUID INTAKE SUBSTANCE CLASS: NORMAL
LYMPHOCYTES # FLD MANUAL: 5 %
MESOTHL CELL NFR FLD: 2 %
MONOS+MACROS NFR FLD MANUAL: 75 %
NEUTS SEG # FLD MANUAL: 18 %
NRBC # FLD: 0
RBC # FLD MANUAL: 1000 /UL
SYCRY CLARITY: CLEAR
SYCRY COLOR: YELLOW
SYCRY ID: NORMAL
SYCRY TUBE: NORMAL
TOTAL CELLS COUNTED FLD: 158 /UL
TUBE TYPE: NORMAL
UNIDENT CELLS NFR FLD MANUAL: 0 %
VARIANT LYMPHS # FLD MANUAL: 0 %

## 2019-10-28 ENCOUNTER — APPOINTMENT (OUTPATIENT)
Dept: DERMATOLOGY | Facility: CLINIC | Age: 79
End: 2019-10-28
Payer: MEDICARE

## 2019-10-28 PROCEDURE — D0126: CPT

## 2019-10-30 ENCOUNTER — APPOINTMENT (OUTPATIENT)
Dept: RHEUMATOLOGY | Facility: CLINIC | Age: 79
End: 2019-10-30
Payer: MEDICARE

## 2019-10-30 VITALS
BODY MASS INDEX: 23.04 KG/M2 | RESPIRATION RATE: 15 BRPM | HEART RATE: 79 BPM | SYSTOLIC BLOOD PRESSURE: 123 MMHG | HEIGHT: 63 IN | DIASTOLIC BLOOD PRESSURE: 73 MMHG | WEIGHT: 130 LBS | TEMPERATURE: 98 F | OXYGEN SATURATION: 95 %

## 2019-10-30 VITALS
HEART RATE: 91 BPM | TEMPERATURE: 98.5 F | BODY MASS INDEX: 23.04 KG/M2 | WEIGHT: 130 LBS | OXYGEN SATURATION: 96 % | HEIGHT: 63 IN | SYSTOLIC BLOOD PRESSURE: 148 MMHG | RESPIRATION RATE: 16 BRPM | DIASTOLIC BLOOD PRESSURE: 85 MMHG

## 2019-10-30 VITALS
DIASTOLIC BLOOD PRESSURE: 81 MMHG | RESPIRATION RATE: 15 BRPM | SYSTOLIC BLOOD PRESSURE: 128 MMHG | HEART RATE: 86 BPM | OXYGEN SATURATION: 96 %

## 2019-10-30 VITALS
SYSTOLIC BLOOD PRESSURE: 127 MMHG | OXYGEN SATURATION: 98 % | HEART RATE: 97 BPM | DIASTOLIC BLOOD PRESSURE: 79 MMHG | TEMPERATURE: 98.6 F | RESPIRATION RATE: 17 BRPM

## 2019-10-30 VITALS
OXYGEN SATURATION: 97 % | SYSTOLIC BLOOD PRESSURE: 119 MMHG | RESPIRATION RATE: 16 BRPM | DIASTOLIC BLOOD PRESSURE: 72 MMHG | HEART RATE: 88 BPM

## 2019-10-30 VITALS
DIASTOLIC BLOOD PRESSURE: 77 MMHG | HEART RATE: 85 BPM | SYSTOLIC BLOOD PRESSURE: 134 MMHG | OXYGEN SATURATION: 97 % | RESPIRATION RATE: 16 BRPM

## 2019-10-30 VITALS
HEART RATE: 86 BPM | OXYGEN SATURATION: 97 % | DIASTOLIC BLOOD PRESSURE: 83 MMHG | RESPIRATION RATE: 15 BRPM | SYSTOLIC BLOOD PRESSURE: 146 MMHG

## 2019-10-30 PROCEDURE — 96413 CHEMO IV INFUSION 1 HR: CPT

## 2019-10-30 PROCEDURE — 96375 TX/PRO/DX INJ NEW DRUG ADDON: CPT

## 2019-10-30 PROCEDURE — 96415 CHEMO IV INFUSION ADDL HR: CPT

## 2019-10-30 RX ORDER — RITUXIMAB 10 MG/ML
500 INJECTION, SOLUTION INTRAVENOUS
Qty: 0 | Refills: 0 | Status: COMPLETED | OUTPATIENT
Start: 2019-09-16

## 2019-10-30 RX ORDER — ACETAMINOPHEN 325 MG/1
325 TABLET ORAL
Qty: 0 | Refills: 0 | Status: COMPLETED | OUTPATIENT
Start: 2019-10-24

## 2019-10-30 RX ORDER — CETIRIZINE HYDROCHLORIDE 10 MG/1
10 TABLET, COATED ORAL
Qty: 0 | Refills: 0 | Status: COMPLETED | OUTPATIENT
Start: 2019-10-24

## 2019-10-30 RX ORDER — METHYLPREDNISOLONE 125 MG/2ML
125 INJECTION, POWDER, LYOPHILIZED, FOR SOLUTION INTRAMUSCULAR; INTRAVENOUS
Qty: 0 | Refills: 0 | Status: COMPLETED | OUTPATIENT
Start: 2019-10-24

## 2019-10-31 NOTE — DATA REVIEWED
[FreeTextEntry1] : Spirometry pre-and post bronchodilator therapy shows mild obstructive lung disease. FEV1 is 1.35 L which is 69% predicted. FEV1 percent is 73%. There is no significant bronchodilator response. negative...

## 2019-11-20 ENCOUNTER — APPOINTMENT (OUTPATIENT)
Dept: RHEUMATOLOGY | Facility: CLINIC | Age: 79
End: 2019-11-20
Payer: MEDICARE

## 2019-11-20 VITALS
HEART RATE: 100 BPM | OXYGEN SATURATION: 99 % | RESPIRATION RATE: 16 BRPM | SYSTOLIC BLOOD PRESSURE: 113 MMHG | TEMPERATURE: 98.4 F | DIASTOLIC BLOOD PRESSURE: 77 MMHG

## 2019-11-20 VITALS
OXYGEN SATURATION: 96 % | RESPIRATION RATE: 15 BRPM | HEART RATE: 94 BPM | DIASTOLIC BLOOD PRESSURE: 82 MMHG | SYSTOLIC BLOOD PRESSURE: 145 MMHG

## 2019-11-20 VITALS
TEMPERATURE: 98.1 F | SYSTOLIC BLOOD PRESSURE: 118 MMHG | HEART RATE: 95 BPM | DIASTOLIC BLOOD PRESSURE: 74 MMHG | OXYGEN SATURATION: 96 % | RESPIRATION RATE: 15 BRPM

## 2019-11-20 VITALS
HEART RATE: 97 BPM | OXYGEN SATURATION: 97 % | TEMPERATURE: 98.1 F | SYSTOLIC BLOOD PRESSURE: 130 MMHG | RESPIRATION RATE: 15 BRPM | DIASTOLIC BLOOD PRESSURE: 79 MMHG

## 2019-11-20 VITALS
SYSTOLIC BLOOD PRESSURE: 143 MMHG | RESPIRATION RATE: 15 BRPM | HEART RATE: 92 BPM | DIASTOLIC BLOOD PRESSURE: 92 MMHG | TEMPERATURE: 98 F | OXYGEN SATURATION: 96 %

## 2019-11-20 PROCEDURE — 96375 TX/PRO/DX INJ NEW DRUG ADDON: CPT

## 2019-11-20 PROCEDURE — 96413 CHEMO IV INFUSION 1 HR: CPT

## 2019-11-20 PROCEDURE — 96415 CHEMO IV INFUSION ADDL HR: CPT

## 2019-11-20 RX ORDER — RITUXIMAB 10 MG/ML
500 INJECTION, SOLUTION INTRAVENOUS
Qty: 0 | Refills: 0 | Status: COMPLETED | OUTPATIENT
Start: 2019-09-16

## 2019-11-20 RX ORDER — CETIRIZINE HYDROCHLORIDE 10 MG/1
10 TABLET, COATED ORAL
Qty: 0 | Refills: 0 | Status: COMPLETED | OUTPATIENT
Start: 2019-11-07

## 2019-11-20 RX ORDER — METHYLPREDNISOLONE 125 MG/2ML
125 INJECTION, POWDER, LYOPHILIZED, FOR SOLUTION INTRAMUSCULAR; INTRAVENOUS
Qty: 0 | Refills: 0 | Status: COMPLETED | OUTPATIENT
Start: 2019-11-07

## 2019-11-20 RX ORDER — ACETAMINOPHEN 325 MG/1
325 TABLET ORAL
Qty: 0 | Refills: 0 | Status: COMPLETED | OUTPATIENT
Start: 2019-11-07

## 2019-12-05 LAB
ALBUMIN SERPL ELPH-MCNC: 4.4 G/DL
ALP BLD-CCNC: 104 U/L
ALT SERPL-CCNC: 13 U/L
ANION GAP SERPL CALC-SCNC: 13 MMOL/L
AST SERPL-CCNC: 21 U/L
BASOPHILS # BLD AUTO: 0.11 K/UL
BASOPHILS NFR BLD AUTO: 1 %
BILIRUB SERPL-MCNC: 0.4 MG/DL
BUN SERPL-MCNC: 29 MG/DL
CALCIUM SERPL-MCNC: 9.6 MG/DL
CD19 CELLS NFR BLD: 1 /UL
CELLS.CD3-CD19+/CELLS IN BLOOD: <1 %
CHLORIDE SERPL-SCNC: 103 MMOL/L
CO2 SERPL-SCNC: 24 MMOL/L
CREAT SERPL-MCNC: 1.04 MG/DL
CRP SERPL-MCNC: 0.26 MG/DL
EOSINOPHIL # BLD AUTO: 0.26 K/UL
EOSINOPHIL NFR BLD AUTO: 2.4 %
ERYTHROCYTE [SEDIMENTATION RATE] IN BLOOD BY WESTERGREN METHOD: 25 MM/HR
GLUCOSE SERPL-MCNC: 94 MG/DL
HCT VFR BLD CALC: 42.6 %
HGB BLD-MCNC: 13.2 G/DL
IMM GRANULOCYTES NFR BLD AUTO: 0.6 %
LYMPHOCYTES # BLD AUTO: 2.2 K/UL
LYMPHOCYTES NFR BLD AUTO: 20.5 %
MAN DIFF?: NORMAL
MCHC RBC-ENTMCNC: 30.1 PG
MCHC RBC-ENTMCNC: 31 GM/DL
MCV RBC AUTO: 97.3 FL
MONOCYTES # BLD AUTO: 0.75 K/UL
MONOCYTES NFR BLD AUTO: 7 %
NEUTROPHILS # BLD AUTO: 7.35 K/UL
NEUTROPHILS NFR BLD AUTO: 68.5 %
PLATELET # BLD AUTO: 309 K/UL
POTASSIUM SERPL-SCNC: 4.6 MMOL/L
PROT SERPL-MCNC: 7 G/DL
RBC # BLD: 4.38 M/UL
RBC # FLD: 13.2 %
SODIUM SERPL-SCNC: 140 MMOL/L
WBC # FLD AUTO: 10.73 K/UL

## 2019-12-06 ENCOUNTER — APPOINTMENT (OUTPATIENT)
Dept: RHEUMATOLOGY | Facility: CLINIC | Age: 79
End: 2019-12-06
Payer: MEDICARE

## 2019-12-06 VITALS
TEMPERATURE: 97.9 F | HEART RATE: 93 BPM | DIASTOLIC BLOOD PRESSURE: 72 MMHG | SYSTOLIC BLOOD PRESSURE: 128 MMHG | WEIGHT: 130 LBS | BODY MASS INDEX: 23.03 KG/M2 | OXYGEN SATURATION: 99 %

## 2019-12-06 LAB — 25(OH)D3 SERPL-MCNC: 26.3 NG/ML

## 2019-12-06 PROCEDURE — 99214 OFFICE O/P EST MOD 30 MIN: CPT | Mod: 25

## 2019-12-06 PROCEDURE — 20610 DRAIN/INJ JOINT/BURSA W/O US: CPT | Mod: LT

## 2019-12-06 RX ORDER — AZITHROMYCIN 200 MG/5ML
200 POWDER, FOR SUSPENSION ORAL DAILY
Qty: 1 | Refills: 2 | Status: DISCONTINUED | COMMUNITY
Start: 2017-08-06 | End: 2019-12-06

## 2019-12-06 RX ORDER — METHYLPRED ACET/NACL,ISO-OS/PF 80 MG/ML
80 VIAL (ML) INJECTION
Qty: 1 | Refills: 0 | Status: COMPLETED | OUTPATIENT
Start: 2019-12-06

## 2019-12-06 RX ORDER — MUPIROCIN 20 MG/G
2 OINTMENT TOPICAL
Qty: 1 | Refills: 1 | Status: DISCONTINUED | COMMUNITY
Start: 2018-08-08 | End: 2019-12-06

## 2019-12-06 RX ADMIN — METHYLPREDNISOLONE ACETATE 0 MG/ML: 80 INJECTION, SUSPENSION INTRA-ARTICULAR; INTRALESIONAL; INTRAMUSCULAR; SOFT TISSUE at 00:00

## 2019-12-06 NOTE — PROCEDURE
[FreeTextEntry1] : Procedure: Left knee injection with depomedrol 80mg\par Date: 12/6/19\par Indications: therapeutic purposes \par #1 site identified in the left knee\par \par The procedure risks was discussed with the patient.\par Indications: therapeutic purposes \par #1 site identified in the left knee . Verbal consent was obtained. \par Anesthesia was with ethyl chloride.\par The patient was prepped with betadine solution. \par A 25 gauge 1.5 inch needle was used.\par Injectables: Depomedrol 80 mg was injected into the site The Depomedrol was mixed with 1mL of xylocaine 1%. \par The patient tolerated the procedure well..\par There were no complications. Handouts/patient instructions were given to patient . patient was instructed to call if redness at site, a decrease in range of motion or an increase in pain is noted after procedure. \par \par

## 2019-12-06 NOTE — HISTORY OF PRESENT ILLNESS
[FreeTextEntry1] : 79 y/o woman with hx of GERD, COPD/asthmatic component, borderline elevated HTN, HLD on crestor, degenerative disc disease of spine, and on adderall for paralytic sleep and attention deficit, here for f/u of her inflammatory arthritis. I am treating her for seronegative RA/with +RNP/-GABBY. She is on plaquenil 200mg BID and Rituxan. She received her rituxan 10/30/19 and 11/20/19.  \par \par Her pain is 9/10 in intensity.  Her pain is in left knee, right shoulder, and right 1st MCP.  She states Dr. Vasquez told her to continue getting CS injection in left knee q 3 months since she was not interested in knee replacement at this time.  She was told she had bad OA.  \par She went to see Dr. Melendez.  He took XRs right shoulder and was told she didn't have arthritis.  \par She received a CS injection and it helped right shoulder. \par \par She denies any recent fevers, chills, infections, \par She had bladder botox done mid october. Doesn't feel it helped.  \par \par +dry mouth is slightly improved\par +dry eye.  She is going to see an ophthalmologist next week.  She sees Dr. Molina.  \par no rashes, no personal or known fam hx of psoriasis.\par +Fatigue\par \par Labs 12/6/19 reviewed\par Borderline leukocytosis- patient feeling well, denies any symptoms of sickness.  May be related to steroid she received with rituxan infusion.  \par Vit D is slightly low\par \par She is taking Vit D 2000IU daily\par Had flu shot 10/3/19\par \par Cancer screening:\par CT chest in 10/30/17: b/L stable nodules\par Last mammogram was 12/29/17: negative. \par EGD 1/19/18: Erosive gastritis\par Colonoscopy 1/19/18: normal. \par  \par \par

## 2019-12-06 NOTE — ASSESSMENT
[FreeTextEntry1] : \par 79 y/o woman with hx of COPD/asthma, HLD, paralytic sleep disorder, erosive gastritis, DJD/Levoscoliosis spine, presents for seronegative RA/+RNP wth negative AGBBY (combination of inflammatory arthritis, raynaud's, fatigue, b/L nodular opacities seen in lung likely related). She is currently off prednisone. She is currently improved overall with regards to her RA and knee OA.  Her left knee is bothering her still.  She had seen Dr. Vasquez who told her she had bad OA.  Patient was offered knee replacement, but instead would like to continue with Injection therapy.  \par \par Plan:\par RA/+RNP: overall stable. \par -continue plaquenil 200mg BID for seronegative RA/+RNP. G6PD is WNL. She goes to her eye doctor for monitoring q6 months.  Has appt with Dr. Molina this month.  \par -Off MTX and folic acid due to intolerance. \par -remain off prednisone\par -S/p Rituxan 10/30 and 11/20/19. Cont Rituxan 1000mg q 14 days x 2 q 6 months.   Potential risks of rituxan including but not limited to infections, some serious, infusion reactions, cardiovascular effects, and allergic reactions were discussed.\par -XR chest 9/2015 reviewed, biapical pleural thickening seen. She did f/u with Dr. Walls. Had PFTs, stable and no evidence of restrictive or obstructive lung disease. \par -She has hx of smoking, distant. She is up to date with PFTs/ECHO. Advised continued f/u with Pulm. \par -She will need routine screening with PFTs and ECHO. \par -prev declined amlodipine for raynaud's. To continue to use conservative measures for hand warming. \par -flu shot given 10/3/19. \par \par Left shoulder pain/impingement\par -80mg depomedrol administered to left SAB 12/4/18- didn't help too much\par -Completed shoulder PT\par \par \par Knee OA- doing well today\par -Right knee injected with 80mg depomedrol 8/8/19. Will inject again today 12/6/2019.\par -Completed 3 injections of supartz in right knee, and 5/5 for left knee. \par \par Vit D deficiency-\par -Rx 50,000IU q week x 4 weeks, then resume 2000IU daily.  \par -Vit D is 26 Continue Vit D supplementation.\par -Check Vit D with next labs\par \par \par Oral thrush- improved, but small area still remains\par -Restart Nystatin swish and spit for another 7 days\par \par labs reviewed.\par f/u 3 months  labs before next visit. \par  \par \par \par \par \par

## 2019-12-11 ENCOUNTER — APPOINTMENT (OUTPATIENT)
Dept: INTERNAL MEDICINE | Facility: CLINIC | Age: 79
End: 2019-12-11
Payer: MEDICARE

## 2019-12-11 VITALS
RESPIRATION RATE: 16 BRPM | WEIGHT: 127 LBS | HEIGHT: 60 IN | TEMPERATURE: 98.1 F | HEART RATE: 104 BPM | OXYGEN SATURATION: 98 % | DIASTOLIC BLOOD PRESSURE: 80 MMHG | SYSTOLIC BLOOD PRESSURE: 122 MMHG | BODY MASS INDEX: 24.94 KG/M2

## 2019-12-11 DIAGNOSIS — Z00.00 ENCOUNTER FOR GENERAL ADULT MEDICAL EXAMINATION W/OUT ABNORMAL FINDINGS: ICD-10-CM

## 2019-12-11 PROCEDURE — G0438: CPT

## 2019-12-11 RX ORDER — BETAMETHASONE DIPROPIONATE 0.5 MG/G
0.05 OINTMENT TOPICAL
Qty: 1 | Refills: 2 | Status: DISCONTINUED | COMMUNITY
Start: 2019-10-28 | End: 2019-12-11

## 2019-12-11 NOTE — HISTORY OF PRESENT ILLNESS
[FreeTextEntry1] : Patient comes in for an annual physical exam.\par  [de-identified] : Patient is a 79-year-old female with a history of COPD, hyperlipidemia, rheumatoid arthritis comes in for annual exam. Patient had colonoscopy about 2 years ago and repeat indicated in 5 years. Patient had last mammogram last year and is to schedule one at again this month. Patient had bone density last year showing osteopenia. Patient states her last Pap smear was about 5-7 years ago and was within normal limits. Patient did receive flu vaccine and is not sure if she should get pneumonia or shingles vaccine to her rheumatoid arthritis and treatment with Rituxan infusions. Last infusion was one month ago. She does follow regularly with rheumatology and recently had a lot of blood work done last week.\par Patient also has a history of left knee pain with osteoarthritis for which he follows with Dr. Vasquez. Patient recently had Supartz injection for her knee.\par Patient also has a history of nocturia for many years and will be following with urologist . \par Patient denies any cp, sob,abdominal pain, nausea, vomiting, palpitations, fever, chills, constipation, diarrhea.\par

## 2019-12-11 NOTE — PAST MEDICAL HISTORY
[Definite ___ (Date)] : the last menstrual period was [unfilled] [Menarche Age ____] : age at menarche was [unfilled] [Total Preg ___] : G[unfilled]

## 2019-12-11 NOTE — ASSESSMENT
[FreeTextEntry1] : 1.health maintenance: Pap smear collected today, patient will schedule mammogram. Up-to-date with colonoscopy repeat due in 2023, up-to-date with bone density. Up-to-date with flu vaccine. Patient does not wish to have pneumonia or shingles vaccine. Discussed fasting lipids and hemoglobin A1c. Recent blood work done by rheumatology reviewed.\par \par 2.rheumatoid arthritis: Follow up with rheumatology, continue on Rituxan infusions every 6 months. Continue other medications.\par \par 3.knee osteoarthritis: Followup with orthopedics.\par \par 4.COPD with asthma: Last PFT December 2018, follow up with pulmonary medicine.\par \par 5.hyperlipidemia: Recheck fasting lipids, continue on Lipitor 40 mg.\par \par

## 2019-12-11 NOTE — HEALTH RISK ASSESSMENT
[No] : No [Retired] : retired [Guns at Home] : guns at home [Smoke Detector] : smoke detector [Carbon Monoxide Detector] : carbon monoxide detector [Safety elements used in home] : safety elements used in home [Seat Belt] :  uses seat belt [Sunscreen] : uses sunscreen [Patient reported colonoscopy was normal] : Patient reported colonoscopy was normal [Patient reported bone density results were abnormal] : Patient reported bone density results were abnormal [Patient reported mammogram was normal] : Patient reported mammogram was normal [] :  [Fully functional (bathing, dressing, toileting, transferring, walking, feeding)] : Fully functional (bathing, dressing, toileting, transferring, walking, feeding) [Fully functional (using the telephone, shopping, preparing meals, housekeeping, doing laundry, using] : Fully functional and needs no help or supervision to perform IADLs (using the telephone, shopping, preparing meals, housekeeping, doing laundry, using transportation, managing medications and managing finances) [] : No [de-identified] : former smoker [YearQuit] : 1990 [MammogramDate] : 12/18 [BoneDensityDate] : 12/18 [BoneDensityComments] : Osteopenia [ColonoscopyDate] : 01/18 [ColonoscopyComments] : Repeat in 1/2023, /Mariah.

## 2019-12-18 LAB — CYTOLOGY CVX/VAG DOC THIN PREP: ABNORMAL

## 2020-01-13 ENCOUNTER — RX RENEWAL (OUTPATIENT)
Age: 80
End: 2020-01-13

## 2020-02-19 ENCOUNTER — APPOINTMENT (OUTPATIENT)
Dept: DERMATOLOGY | Facility: CLINIC | Age: 80
End: 2020-02-19
Payer: MEDICARE

## 2020-02-19 DIAGNOSIS — L57.0 ACTINIC KERATOSIS: ICD-10-CM

## 2020-02-19 PROCEDURE — 17000 DESTRUCT PREMALG LESION: CPT

## 2020-02-19 PROCEDURE — 17003 DESTRUCT PREMALG LES 2-14: CPT

## 2020-02-19 PROCEDURE — 99213 OFFICE O/P EST LOW 20 MIN: CPT | Mod: 25

## 2020-02-19 NOTE — PHYSICAL EXAM
[Oriented x 3] : ~L oriented x 3 [Well Nourished] : well nourished [Alert] : alert [FreeTextEntry3] : A full skin exam was performed including the scalp, face (including the lips, ears, nose and eyes), neck, chest, breasts, abdomen, back, buttocks, upper extremities and lower extremities.  The patient declined examination of the genitalia.  \par The exam revealed the following benign growths:\par Seborrheic keratoses.\par Lentigines.\par Cherry angioma.\par \par Keratotic papules - right suprabrow, left nose and left temple. [Full Body Skin Exam Performed] : performed

## 2020-02-19 NOTE — HISTORY OF PRESENT ILLNESS
[FreeTextEntry1] : Patient presents for skin examination. [de-identified] : Denies new, changing, bleeding or tender lesions on the skin over the past year.\par

## 2020-03-06 ENCOUNTER — APPOINTMENT (OUTPATIENT)
Dept: RHEUMATOLOGY | Facility: CLINIC | Age: 80
End: 2020-03-06
Payer: MEDICARE

## 2020-03-06 VITALS
HEIGHT: 60 IN | SYSTOLIC BLOOD PRESSURE: 130 MMHG | TEMPERATURE: 98.3 F | BODY MASS INDEX: 25.52 KG/M2 | WEIGHT: 130 LBS | OXYGEN SATURATION: 96 % | DIASTOLIC BLOOD PRESSURE: 70 MMHG | HEART RATE: 90 BPM

## 2020-03-06 DIAGNOSIS — M75.41 IMPINGEMENT SYNDROME OF RIGHT SHOULDER: ICD-10-CM

## 2020-03-06 DIAGNOSIS — M25.462 EFFUSION, LEFT KNEE: ICD-10-CM

## 2020-03-06 LAB
25(OH)D3 SERPL-MCNC: 34.4 NG/ML
ALBUMIN SERPL ELPH-MCNC: 4.3 G/DL
ALP BLD-CCNC: 107 U/L
ALT SERPL-CCNC: 14 U/L
ANION GAP SERPL CALC-SCNC: 13 MMOL/L
AST SERPL-CCNC: 20 U/L
BASOPHILS # BLD AUTO: 0.12 K/UL
BASOPHILS NFR BLD AUTO: 1.2 %
BILIRUB SERPL-MCNC: 0.3 MG/DL
BUN SERPL-MCNC: 28 MG/DL
CALCIUM SERPL-MCNC: 9.5 MG/DL
CHLORIDE SERPL-SCNC: 105 MMOL/L
CO2 SERPL-SCNC: 24 MMOL/L
CREAT SERPL-MCNC: 0.87 MG/DL
CRP SERPL-MCNC: 0.53 MG/DL
EOSINOPHIL # BLD AUTO: 0.51 K/UL
EOSINOPHIL NFR BLD AUTO: 5.2 %
ERYTHROCYTE [SEDIMENTATION RATE] IN BLOOD BY WESTERGREN METHOD: 34 MM/HR
GLUCOSE SERPL-MCNC: 106 MG/DL
HCT VFR BLD CALC: 42.1 %
HGB BLD-MCNC: 13.2 G/DL
IMM GRANULOCYTES NFR BLD AUTO: 0.3 %
LYMPHOCYTES # BLD AUTO: 2 K/UL
LYMPHOCYTES NFR BLD AUTO: 20.4 %
MAN DIFF?: NORMAL
MCHC RBC-ENTMCNC: 29.9 PG
MCHC RBC-ENTMCNC: 31.4 GM/DL
MCV RBC AUTO: 95.5 FL
MONOCYTES # BLD AUTO: 0.61 K/UL
MONOCYTES NFR BLD AUTO: 6.2 %
NEUTROPHILS # BLD AUTO: 6.54 K/UL
NEUTROPHILS NFR BLD AUTO: 66.7 %
PLATELET # BLD AUTO: 310 K/UL
POTASSIUM SERPL-SCNC: 5 MMOL/L
PROT SERPL-MCNC: 6.6 G/DL
RBC # BLD: 4.41 M/UL
RBC # FLD: 13.2 %
SODIUM SERPL-SCNC: 143 MMOL/L
WBC # FLD AUTO: 9.81 K/UL

## 2020-03-06 PROCEDURE — 99214 OFFICE O/P EST MOD 30 MIN: CPT | Mod: 25

## 2020-03-06 PROCEDURE — 20610 DRAIN/INJ JOINT/BURSA W/O US: CPT | Mod: RT,59

## 2020-03-06 RX ORDER — METHYLPRED ACET/NACL,ISO-OS/PF 80 MG/ML
80 VIAL (ML) INJECTION
Qty: 1 | Refills: 0 | Status: COMPLETED | OUTPATIENT
Start: 2020-03-06

## 2020-03-06 RX ADMIN — METHYLPREDNISOLONE ACETATE 0 MG/ML: 80 INJECTION, SUSPENSION INTRA-ARTICULAR; INTRALESIONAL; INTRAMUSCULAR; SOFT TISSUE at 00:00

## 2020-03-06 NOTE — PROCEDURE
[FreeTextEntry1] : Procedure: Left knee injection with depomedrol 80mg\par Date: 12/6/19\par Indications: therapeutic purposes \par #1 site identified in the left knee\par \par The procedure risks was discussed with the patient.\par Indications: therapeutic purposes \par #1 site identified in the left knee . Verbal consent was obtained. \par Anesthesia was with ethyl chloride.\par The patient was prepped with betadine solution. \par A 25 gauge 1.5 inch needle was used.\par 8 cc of clear yellow fluid was aspirated from left knee prior to injecting CS.  \par Injectables: Depomedrol 80 mg was injected into the site The Depomedrol was mixed with 1mL of xylocaine 1%. \par The patient tolerated the procedure well..\par There were no complications. Handouts/patient instructions were given to patient . patient was instructed to call if redness at site, a decrease in range of motion or an increase in pain is noted after procedure. \par \par Synovial fluid sent for testing.  \par \par #2\par Right SAB injection\par Indications: therapeutic purposes \par #1 site identified in right SAB\par \par The procedure risks was discussed with the patient.\par Indications: therapeutic purposes \par #1 site identified in the right  shoulder . Verbal consent was obtained. \par Anesthesia was with ethyl chloride.\par The patient was prepped with betadine solution. \par A 25 gauge 1.5 inch needle was used.\par Injectables: Depomedrol 80 mg was injected into the site The Depomedrol was mixed with 1mL of xylocaine 1%. \par The patient tolerated the procedure well..\par There were no complications. Handouts/patient instructions were given to patient . patient was instructed to call if redness at site, a decrease in range of motion or an increase in pain is noted after procedure. \par \par

## 2020-03-06 NOTE — ASSESSMENT
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 She is currently off prednisone. She is currently improved overall with regards to her RA. Her left knee is bothering her still. She had seen Dr. Vasquez who told her she had bad OA. Patient was offered knee replacement, but instead would like to continue with Injection therapy. Will linject left knee today with CS.  She also has impingement of right shoulder today.  \par \par Plan:\par RA/+RNP: overall stable. \par -continue plaquenil 200mg BID for seronegative RA/+RNP. G6PD is WNL. She goes to her eye doctor for monitoring q6 months. Had appt with Dr. Molina 12/2019\par -Off MTX and folic acid due to intolerance. \par -remain off prednisone\par -S/p Rituxan 10/30 and 11/20/19. Cont Rituxan 1000mg q 14 days x 2 q 6 months. Next due 05/21/2020. Potential risks of rituxan including but not limited to infections, some serious, infusion reactions, cardiovascular effects, and allergic reactions were discussed.\par -XR chest 9/2015 reviewed, biapical pleural thickening seen. She saw Dr. Walls. Had PFTs, stable and no evidence of restrictive or obstructive lung disease. \par -She has hx of smoking, distant. Advised continued f/u with Pulm for monitoring of pulmonary nodules.\par routine screening with PFTs and ECHO. \par -prev declined amlodipine for raynaud's. To continue to use conservative measures for hand warming. \par -flu shot given 10/3/19. \par \par URI\par -Z marck Rx'd for persistent URI \par \par Left shoulder pain/impingement- not active.  Now right shoulder has impingement.  \par -Injecte right SAB today 3/6/20 witih 80mg depomedrol. \par -80mg depomedrol administered to left SAB 12/4/18- didn't help too much\par -Completed shoulder PT\par \par \par Knee OA- doing well today\par -Injected left knee today 3/6/20 with 80mg depomedrol after aspirating 8 cc of fluid from left knee.  Will send for testing.  \par -Completed 3 injections of supartz in right knee, and 5/5 for left knee.  Completed injection 10/18/19.  Next due 4/29/20.  \par \par Vit D deficiency-\par -Completed 50,000IU q week x 4 weeks.  currently back on  2000IU daily. \par -Vit D is WNL.\par -Check Vit D with next labs\par \par \par Oral thrush- improved, but small area still remains\par -Restart Nystatin swish and spit for another 7 days\par \par labs reviewed.\par f/u 3 months labs before next visit. \par  \par

## 2020-03-06 NOTE — HISTORY OF PRESENT ILLNESS
[FreeTextEntry1] : 78 y/o woman with hx of GERD, COPD/asthmatic component, borderline elevated HTN, HLD on crestor, degenerative disc disease of spine, and on adderall for paralytic sleep and attention deficit, here for f/u of her inflammatory arthritis. I am treating her for seronegative RA/with +RNP/-GABBY. She is on plaquenil 200mg BID and Rituxan. She received her rituxan 10/30/19 and 11/20/19. \par \par Her pain is 9/10 in intensity. Right shoulder, right hip, and left knee. The right shoulder hurts boo she goes to use it.  She previously responded well to CS injectio to right shoulder.  She would also like CS injection to left knee today.  \par \par Pedro told her to continue getting CS injection in left knee q 3 months since she was not interested in knee replacement at this time. She was told she had bad OA. \par She went to see Dr. Melendez. He took XRs right shoulder and was told she didn't have arthritis. \par She is currently taking naproxen 500mg roughly once/day, and it does help.  \par \par She recently had URI for a month.  She took cipro.  Now she has some type of infection again.  She has nasal congestion, sore throat, and burp/vomiting.  She states she had fever about 1-2 months ago, no recent fever.  \par \par She had bladder botox done mid october. Doesn't feel it helped. \par \par Saw her cardiologist last week.  She is having nuclear scan.  \par \par +dry mouth is slightly improved.  She is taking Biotene.  \par +dry eye. She is going to see an ophthalmologist next week. She sees Dr. Molina. 12/2019\par no rashes, no personal or known fam hx of psoriasis.\par +Fatigue\par \par She is taking Vit D 2000IU daily, she did complete the 50,000IU q week.  \par Had flu shot 10/3/19\par \par She never used the additional nystatin.  \par \par Cancer screening:\par CT chest in 10/30/17: b/L stable nodules\par Last mammogram was 12/29/17: negative. \par EGD 1/19/18: Erosive gastritis\par Colonoscopy 1/19/18: normal. \par

## 2020-03-07 LAB
B PERT IGG+IGM PNL SER: CLEAR
COLOR FLD: YELLOW
EOSINOPHIL # FLD MANUAL: 0 %
FLUID INTAKE SUBSTANCE CLASS: NORMAL
LYMPHOCYTES # FLD MANUAL: 10 %
MESOTHL CELL NFR FLD: 0 %
MONOS+MACROS NFR FLD MANUAL: 74 %
NEUTS SEG # FLD MANUAL: 16 %
NRBC # FLD: 0
RBC # FLD MANUAL: 99 /UL
SYCRY CLARITY: CLEAR
SYCRY COLOR: YELLOW
SYCRY ID: NORMAL
SYCRY TUBE: NORMAL
TOTAL CELLS COUNTED FLD: 471 /UL
TUBE TYPE: NORMAL
UNIDENT CELLS NFR FLD MANUAL: 0 %
VARIANT LYMPHS # FLD MANUAL: 0 %

## 2020-03-24 LAB — BACTERIA FLD CULT: NORMAL

## 2020-04-30 ENCOUNTER — APPOINTMENT (OUTPATIENT)
Dept: RHEUMATOLOGY | Facility: CLINIC | Age: 80
End: 2020-04-30

## 2020-05-08 ENCOUNTER — APPOINTMENT (OUTPATIENT)
Dept: RHEUMATOLOGY | Facility: CLINIC | Age: 80
End: 2020-05-08

## 2020-05-15 ENCOUNTER — APPOINTMENT (OUTPATIENT)
Dept: RHEUMATOLOGY | Facility: CLINIC | Age: 80
End: 2020-05-15
Payer: MEDICARE

## 2020-05-15 VITALS
SYSTOLIC BLOOD PRESSURE: 100 MMHG | HEIGHT: 60 IN | HEART RATE: 91 BPM | OXYGEN SATURATION: 96 % | WEIGHT: 130 LBS | TEMPERATURE: 98.4 F | BODY MASS INDEX: 25.52 KG/M2 | DIASTOLIC BLOOD PRESSURE: 60 MMHG

## 2020-05-15 PROCEDURE — 20610 DRAIN/INJ JOINT/BURSA W/O US: CPT | Mod: 50

## 2020-05-15 PROCEDURE — 36415 COLL VENOUS BLD VENIPUNCTURE: CPT

## 2020-05-15 RX ORDER — SODIUM HYALURONATE INTRA-ARTICULAR SOLN PREF SYR 25 MG/2.5ML 25/2.5 MG/ML
25 SOLUTION PREFILLED SYRINGE INTRAARTICULAR
Refills: 0 | Status: COMPLETED | OUTPATIENT
Start: 2020-05-15

## 2020-05-15 RX ADMIN — SODIUM HYALURONATE INTRA-ARTICULAR SOLN PREF SYR 25 MG/2.5ML 0 MG/2.5ML: 25/2.5 SOLUTION PREFILLED SYRINGE at 00:00

## 2020-05-15 NOTE — PROCEDURE
[FreeTextEntry1] : Procedure: b/L knee injection with Supartz 25mg\par Date: 5/15/20\par Indications: therapeutic purposes \par #1 site identified in the right knee.\par \par The procedure risks was discussed with the patient.\par Indications: therapeutic purposes \par #1 site identified in the right knee . Verbal consent was obtained. \par Anesthesia was with ethyl chloride.\par The patient was prepped with betadine solution. \par A 21 gauge 1.5 inch needle was used.\par Injectables: 1mL of xylocaine 1% for local anesthetic. Injected 25mg Supartz to left knee. \par The patient tolerated the procedure well. No fluid was aspirated. \par There were no complications. Handouts/patient instructions were given to patient . patient was instructed to call if redness at site, a decrease in range of motion or an increase in pain is noted after procedure. \par \par #2\par Identical procedure performed for left knee, however there was 10 cc of serous fluid apsirated from left knee.  As the aspirate was mixed with xylocaine, I did not sent it for testing.

## 2020-05-15 NOTE — ASSESSMENT
[FreeTextEntry1] : Injected first of 5 b/L supartz injections.  \par f/u 1 week for next set of supartz injections

## 2020-05-19 LAB
ALBUMIN SERPL ELPH-MCNC: 4.1 G/DL
ALP BLD-CCNC: 98 U/L
ALT SERPL-CCNC: 14 U/L
ANION GAP SERPL CALC-SCNC: 12 MMOL/L
AST SERPL-CCNC: 19 U/L
BASOPHILS # BLD AUTO: 0.11 K/UL
BASOPHILS NFR BLD AUTO: 1.4 %
BILIRUB SERPL-MCNC: 0.3 MG/DL
BUN SERPL-MCNC: 21 MG/DL
CALCIUM SERPL-MCNC: 9.3 MG/DL
CHLORIDE SERPL-SCNC: 104 MMOL/L
CO2 SERPL-SCNC: 24 MMOL/L
CREAT SERPL-MCNC: 0.78 MG/DL
CRP SERPL-MCNC: 0.41 MG/DL
EOSINOPHIL # BLD AUTO: 0.34 K/UL
EOSINOPHIL NFR BLD AUTO: 4.4 %
ERYTHROCYTE [SEDIMENTATION RATE] IN BLOOD BY WESTERGREN METHOD: 32 MM/HR
GLUCOSE SERPL-MCNC: 87 MG/DL
HBV CORE IGG+IGM SER QL: NONREACTIVE
HBV SURFACE AB SERPL IA-ACNC: <3 MIU/ML
HBV SURFACE AG SER QL: NONREACTIVE
HCT VFR BLD CALC: 43.5 %
HCV AB SER QL: NONREACTIVE
HCV S/CO RATIO: 0.1 S/CO
HGB BLD-MCNC: 13.6 G/DL
IMM GRANULOCYTES NFR BLD AUTO: 0.5 %
LYMPHOCYTES # BLD AUTO: 1.64 K/UL
LYMPHOCYTES NFR BLD AUTO: 21.3 %
M TB IFN-G BLD-IMP: NEGATIVE
MAN DIFF?: NORMAL
MCHC RBC-ENTMCNC: 30 PG
MCHC RBC-ENTMCNC: 31.3 GM/DL
MCV RBC AUTO: 95.8 FL
MONOCYTES # BLD AUTO: 0.5 K/UL
MONOCYTES NFR BLD AUTO: 6.5 %
NEUTROPHILS # BLD AUTO: 5.06 K/UL
NEUTROPHILS NFR BLD AUTO: 65.9 %
PLATELET # BLD AUTO: 303 K/UL
POTASSIUM SERPL-SCNC: 4.6 MMOL/L
PROT SERPL-MCNC: 6.5 G/DL
QUANTIFERON TB PLUS MITOGEN MINUS NIL: 6.57 IU/ML
QUANTIFERON TB PLUS NIL: 0.01 IU/ML
QUANTIFERON TB PLUS TB1 MINUS NIL: 0 IU/ML
QUANTIFERON TB PLUS TB2 MINUS NIL: 0 IU/ML
RBC # BLD: 4.54 M/UL
RBC # FLD: 13.8 %
SODIUM SERPL-SCNC: 141 MMOL/L
WBC # FLD AUTO: 7.69 K/UL

## 2020-05-19 RX ORDER — METHYLPREDNISOLONE 125 MG/2ML
125 INJECTION, POWDER, LYOPHILIZED, FOR SOLUTION INTRAMUSCULAR; INTRAVENOUS
Qty: 0 | Refills: 0 | Status: COMPLETED | OUTPATIENT
Start: 2020-05-18 | End: 1900-01-01

## 2020-05-19 RX ORDER — ACETAMINOPHEN 325 MG/1
325 TABLET ORAL
Qty: 0 | Refills: 0 | Status: COMPLETED | OUTPATIENT
Start: 2020-05-18 | End: 1900-01-01

## 2020-05-19 RX ORDER — CETIRIZINE HYDROCHLORIDE 10 MG/1
10 TABLET, COATED ORAL
Qty: 0 | Refills: 0 | Status: COMPLETED | OUTPATIENT
Start: 2020-05-18 | End: 1900-01-01

## 2020-05-22 ENCOUNTER — APPOINTMENT (OUTPATIENT)
Dept: RHEUMATOLOGY | Facility: CLINIC | Age: 80
End: 2020-05-22
Payer: MEDICARE

## 2020-05-22 VITALS
HEART RATE: 93 BPM | TEMPERATURE: 97.3 F | WEIGHT: 130 LBS | SYSTOLIC BLOOD PRESSURE: 120 MMHG | BODY MASS INDEX: 22.2 KG/M2 | OXYGEN SATURATION: 96 % | DIASTOLIC BLOOD PRESSURE: 62 MMHG | HEIGHT: 64 IN

## 2020-05-22 PROCEDURE — 20610 DRAIN/INJ JOINT/BURSA W/O US: CPT | Mod: 50

## 2020-05-22 RX ORDER — SODIUM HYALURONATE INTRA-ARTICULAR SOLN PREF SYR 25 MG/2.5ML 25/2.5 MG/ML
25 SOLUTION PREFILLED SYRINGE INTRAARTICULAR
Refills: 0 | Status: COMPLETED | OUTPATIENT
Start: 2020-05-22

## 2020-05-22 RX ADMIN — SODIUM HYALURONATE INTRA-ARTICULAR SOLN PREF SYR 25 MG/2.5ML 0 MG/2.5ML: 25/2.5 SOLUTION PREFILLED SYRINGE at 00:00

## 2020-05-22 NOTE — PROCEDURE
[FreeTextEntry1] : Procedure: b/L knee injection with Supartz 25mg\par Date: 5/15/20\par Indications: therapeutic purposes \par #1 site identified in the right knee.\par \par The procedure risks was discussed with the patient.\par Indications: therapeutic purposes \par #1 site identified in the right knee . Verbal consent was obtained. \par Anesthesia was with ethyl chloride.\par The patient was prepped with betadine solution. \par A 21 gauge 1.5 inch needle was used.\par Injectables: 1mL of xylocaine 1% for local anesthetic. Injected 25mg Supartz to left knee. \par The patient tolerated the procedure well. No fluid was aspirated. \par There were no complications. Handouts/patient instructions were given to patient . patient was instructed to call if redness at site, a decrease in range of motion or an increase in pain is noted after procedure. \par \par #2\par Identical procedure performed for left knee\par

## 2020-05-22 NOTE — ASSESSMENT
[FreeTextEntry1] : Injected 2nd of 5 b/L supartz injections. \par f/u 1 week for next set of supartz injections. \par \par

## 2020-05-27 ENCOUNTER — APPOINTMENT (OUTPATIENT)
Dept: RHEUMATOLOGY | Facility: CLINIC | Age: 80
End: 2020-05-27
Payer: MEDICARE

## 2020-05-27 VITALS
SYSTOLIC BLOOD PRESSURE: 134 MMHG | OXYGEN SATURATION: 97 % | RESPIRATION RATE: 14 BRPM | TEMPERATURE: 97.8 F | HEART RATE: 72 BPM | DIASTOLIC BLOOD PRESSURE: 74 MMHG

## 2020-05-27 VITALS
SYSTOLIC BLOOD PRESSURE: 114 MMHG | RESPIRATION RATE: 15 BRPM | OXYGEN SATURATION: 95 % | HEART RATE: 89 BPM | DIASTOLIC BLOOD PRESSURE: 69 MMHG | TEMPERATURE: 98.5 F

## 2020-05-27 VITALS
TEMPERATURE: 97.6 F | DIASTOLIC BLOOD PRESSURE: 82 MMHG | RESPIRATION RATE: 14 BRPM | OXYGEN SATURATION: 98 % | HEART RATE: 75 BPM | SYSTOLIC BLOOD PRESSURE: 136 MMHG

## 2020-05-27 VITALS
RESPIRATION RATE: 15 BRPM | HEART RATE: 72 BPM | DIASTOLIC BLOOD PRESSURE: 79 MMHG | TEMPERATURE: 97.8 F | OXYGEN SATURATION: 96 % | SYSTOLIC BLOOD PRESSURE: 134 MMHG

## 2020-05-27 VITALS
TEMPERATURE: 98.4 F | RESPIRATION RATE: 15 BRPM | OXYGEN SATURATION: 97 % | SYSTOLIC BLOOD PRESSURE: 145 MMHG | DIASTOLIC BLOOD PRESSURE: 81 MMHG | HEART RATE: 96 BPM

## 2020-05-27 VITALS
DIASTOLIC BLOOD PRESSURE: 75 MMHG | OXYGEN SATURATION: 95 % | SYSTOLIC BLOOD PRESSURE: 129 MMHG | HEART RATE: 86 BPM | TEMPERATURE: 98 F | RESPIRATION RATE: 16 BRPM

## 2020-05-27 PROCEDURE — 96375 TX/PRO/DX INJ NEW DRUG ADDON: CPT

## 2020-05-27 PROCEDURE — 96413 CHEMO IV INFUSION 1 HR: CPT

## 2020-05-27 PROCEDURE — 96415 CHEMO IV INFUSION ADDL HR: CPT

## 2020-05-27 RX ORDER — CETIRIZINE HYDROCHLORIDE 10 MG/1
10 TABLET, COATED ORAL
Qty: 0 | Refills: 0 | Status: COMPLETED | OUTPATIENT
Start: 2020-05-21

## 2020-05-27 RX ORDER — METHYLPREDNISOLONE 125 MG/2ML
125 INJECTION, POWDER, LYOPHILIZED, FOR SOLUTION INTRAMUSCULAR; INTRAVENOUS
Qty: 0 | Refills: 0 | Status: COMPLETED | OUTPATIENT
Start: 2020-05-21

## 2020-05-27 RX ORDER — RITUXIMAB 10 MG/ML
500 INJECTION, SOLUTION INTRAVENOUS
Qty: 0 | Refills: 0 | Status: COMPLETED | OUTPATIENT
Start: 2020-05-18

## 2020-05-27 RX ORDER — ACETAMINOPHEN 325 MG/1
325 TABLET ORAL
Qty: 0 | Refills: 0 | Status: COMPLETED | OUTPATIENT
Start: 2020-05-21

## 2020-05-29 ENCOUNTER — APPOINTMENT (OUTPATIENT)
Dept: RHEUMATOLOGY | Facility: CLINIC | Age: 80
End: 2020-05-29
Payer: MEDICARE

## 2020-05-29 VITALS
SYSTOLIC BLOOD PRESSURE: 110 MMHG | DIASTOLIC BLOOD PRESSURE: 68 MMHG | TEMPERATURE: 98.1 F | BODY MASS INDEX: 22.31 KG/M2 | WEIGHT: 130 LBS | HEART RATE: 90 BPM | OXYGEN SATURATION: 98 %

## 2020-05-29 PROCEDURE — 20610 DRAIN/INJ JOINT/BURSA W/O US: CPT | Mod: 50

## 2020-05-29 RX ORDER — SODIUM HYALURONATE INTRA-ARTICULAR SOLN PREF SYR 25 MG/2.5ML 25/2.5 MG/ML
25 SOLUTION PREFILLED SYRINGE INTRAARTICULAR
Refills: 0 | Status: COMPLETED | OUTPATIENT
Start: 2020-05-29

## 2020-05-29 RX ADMIN — SODIUM HYALURONATE INTRA-ARTICULAR SOLN PREF SYR 25 MG/2.5ML 0 MG/2.5ML: 25/2.5 SOLUTION PREFILLED SYRINGE at 00:00

## 2020-05-29 NOTE — PROCEDURE
[FreeTextEntry1] : Procedure: b/L knee injection with Supartz 25mg\par Date: 5/29/20\par Indications: therapeutic purposes \par #1 site identified in the right knee.\par \par The procedure risks was discussed with the patient.\par Indications: therapeutic purposes \par #1 site identified in the right knee . Verbal consent was obtained. \par Anesthesia was with ethyl chloride.\par The patient was prepped with betadine solution. \par A 21 gauge 1.5 inch needle was used.\par Injectables: 1mL of xylocaine 1% for local anesthetic. Injected 25mg Supartz to left knee. \par The patient tolerated the procedure well. No fluid was aspirated. \par There were no complications. Handouts/patient instructions were given to patient . patient was instructed to call if redness at site, a decrease in range of motion or an increase in pain is noted after procedure. \par \par #2\par Identical procedure performed for left knee\par

## 2020-05-29 NOTE — ASSESSMENT
[FreeTextEntry1] : injected 3rd of 5 b/L supartz injections\par f/u 1 week for next set of supartz injections

## 2020-06-09 ENCOUNTER — APPOINTMENT (OUTPATIENT)
Dept: RHEUMATOLOGY | Facility: CLINIC | Age: 80
End: 2020-06-09
Payer: MEDICARE

## 2020-06-09 VITALS
BODY MASS INDEX: 22.31 KG/M2 | WEIGHT: 130 LBS | HEART RATE: 91 BPM | OXYGEN SATURATION: 98 % | SYSTOLIC BLOOD PRESSURE: 150 MMHG | DIASTOLIC BLOOD PRESSURE: 88 MMHG | TEMPERATURE: 98.1 F

## 2020-06-09 PROCEDURE — 20610 DRAIN/INJ JOINT/BURSA W/O US: CPT | Mod: 50

## 2020-06-09 RX ORDER — SODIUM HYALURONATE INTRA-ARTICULAR SOLN PREF SYR 25 MG/2.5ML 25/2.5 MG/ML
25 SOLUTION PREFILLED SYRINGE INTRAARTICULAR
Refills: 0 | Status: COMPLETED | OUTPATIENT
Start: 2020-06-09

## 2020-06-09 RX ADMIN — SODIUM HYALURONATE INTRA-ARTICULAR SOLN PREF SYR 25 MG/2.5ML 0 MG/2.5ML: 25/2.5 SOLUTION PREFILLED SYRINGE at 00:00

## 2020-06-09 NOTE — PROCEDURE
[FreeTextEntry1] : Procedure: b/L knee injection with Supartz 25mg\par Date: 6/9/20\par Indications: therapeutic purposes \par #1 site identified in the right knee.\par \par The procedure risks was discussed with the patient.\par Indications: therapeutic purposes \par #1 site identified in the right knee . Verbal consent was obtained. \par Anesthesia was with ethyl chloride.\par The patient was prepped with betadine solution. \par A 21 gauge 1.5 inch needle was used.\par Injectables: 1mL of xylocaine 1% for local anesthetic. Injected 25mg Supartz to left knee. \par The patient tolerated the procedure well. No fluid was aspirated. \par There were no complications. Handouts/patient instructions were given to patient . patient was instructed to call if redness at site, a decrease in range of motion or an increase in pain is noted after procedure. \par \par #2\par Identical procedure performed for left knee\par \par  \par

## 2020-06-11 ENCOUNTER — APPOINTMENT (OUTPATIENT)
Dept: RHEUMATOLOGY | Facility: CLINIC | Age: 80
End: 2020-06-11
Payer: MEDICARE

## 2020-06-11 VITALS
TEMPERATURE: 97.6 F | OXYGEN SATURATION: 93 % | HEART RATE: 82 BPM | DIASTOLIC BLOOD PRESSURE: 67 MMHG | RESPIRATION RATE: 16 BRPM | SYSTOLIC BLOOD PRESSURE: 117 MMHG

## 2020-06-11 VITALS
HEART RATE: 86 BPM | DIASTOLIC BLOOD PRESSURE: 83 MMHG | TEMPERATURE: 97 F | SYSTOLIC BLOOD PRESSURE: 132 MMHG | RESPIRATION RATE: 16 BRPM | OXYGEN SATURATION: 97 %

## 2020-06-11 VITALS
TEMPERATURE: 97.5 F | HEART RATE: 72 BPM | OXYGEN SATURATION: 98 % | RESPIRATION RATE: 16 BRPM | DIASTOLIC BLOOD PRESSURE: 83 MMHG | SYSTOLIC BLOOD PRESSURE: 150 MMHG

## 2020-06-11 VITALS
OXYGEN SATURATION: 96 % | HEART RATE: 78 BPM | DIASTOLIC BLOOD PRESSURE: 83 MMHG | TEMPERATURE: 97.7 F | RESPIRATION RATE: 16 BRPM | SYSTOLIC BLOOD PRESSURE: 138 MMHG

## 2020-06-11 VITALS
RESPIRATION RATE: 16 BRPM | OXYGEN SATURATION: 94 % | HEART RATE: 90 BPM | DIASTOLIC BLOOD PRESSURE: 82 MMHG | SYSTOLIC BLOOD PRESSURE: 128 MMHG

## 2020-06-11 PROCEDURE — 96415 CHEMO IV INFUSION ADDL HR: CPT

## 2020-06-11 PROCEDURE — 36415 COLL VENOUS BLD VENIPUNCTURE: CPT

## 2020-06-11 PROCEDURE — 96413 CHEMO IV INFUSION 1 HR: CPT

## 2020-06-11 PROCEDURE — 96375 TX/PRO/DX INJ NEW DRUG ADDON: CPT

## 2020-06-11 RX ORDER — CETIRIZINE HYDROCHLORIDE 10 MG/1
10 TABLET, COATED ORAL
Qty: 0 | Refills: 0 | Status: COMPLETED | OUTPATIENT
Start: 2020-06-04

## 2020-06-11 RX ORDER — ACETAMINOPHEN 325 MG/1
325 TABLET ORAL
Qty: 0 | Refills: 0 | Status: COMPLETED | OUTPATIENT
Start: 2020-06-04

## 2020-06-11 RX ORDER — METHYLPREDNISOLONE 125 MG/2ML
125 INJECTION, POWDER, LYOPHILIZED, FOR SOLUTION INTRAMUSCULAR; INTRAVENOUS
Qty: 0 | Refills: 0 | Status: COMPLETED | OUTPATIENT
Start: 2020-06-04

## 2020-06-11 RX ORDER — RITUXIMAB 10 MG/ML
500 INJECTION, SOLUTION INTRAVENOUS
Qty: 0 | Refills: 0 | Status: COMPLETED | OUTPATIENT
Start: 2020-05-18

## 2020-06-12 LAB
25(OH)D3 SERPL-MCNC: 37.7 NG/ML
C3 SERPL-MCNC: 114 MG/DL
C4 SERPL-MCNC: 43 MG/DL
CREAT SPEC-SCNC: 53 MG/DL
CREAT/PROT UR: 0.1 RATIO
CRP SERPL-MCNC: 0.41 MG/DL
DSDNA AB SER-ACNC: <12 IU/ML
ENA RNP AB SER IA-ACNC: 0.5 AL
ENA SM AB SER IA-ACNC: <0.2 AL
ERYTHROCYTE [SEDIMENTATION RATE] IN BLOOD BY WESTERGREN METHOD: 17 MM/HR
PROT UR-MCNC: 7 MG/DL

## 2020-06-16 ENCOUNTER — APPOINTMENT (OUTPATIENT)
Dept: RHEUMATOLOGY | Facility: CLINIC | Age: 80
End: 2020-06-16
Payer: MEDICARE

## 2020-06-16 VITALS
WEIGHT: 130 LBS | OXYGEN SATURATION: 97 % | HEART RATE: 104 BPM | TEMPERATURE: 98.2 F | DIASTOLIC BLOOD PRESSURE: 68 MMHG | BODY MASS INDEX: 22.31 KG/M2 | SYSTOLIC BLOOD PRESSURE: 110 MMHG

## 2020-06-16 PROCEDURE — 99214 OFFICE O/P EST MOD 30 MIN: CPT

## 2020-06-16 RX ORDER — AZITHROMYCIN 250 MG/1
250 TABLET, FILM COATED ORAL
Qty: 1 | Refills: 0 | Status: DISCONTINUED | COMMUNITY
Start: 2020-03-06 | End: 2020-06-16

## 2020-06-16 NOTE — HISTORY OF PRESENT ILLNESS
[FreeTextEntry1] : 80 y/o woman with hx of GERD, COPD/asthmatic component, borderline elevated HTN, HLD on crestor, degenerative disc disease of spine, and on adderall for paralytic sleep and attention deficit, here for f/u of her inflammatory arthritis. I am treating her for seronegative RA/with +RNP/-GABBY. She is on plaquenil 200mg BID and Rituxan.  Rituxan started 4/2019.  \par \par She states her knees feel better today.   She would like to be able to negotiate stairs better.   \par She is happy with the injections.  \par She is happy with the PLQ/Rituxan combination.  \par \par +dry mouth improved on biotene.\par Follows with Dr. Molina, 12/2019.  \par \par Takes Vit D supplementation\par \par Labs reviewed, in good range.  \par \par Cancer screening:\par CT chest in 10/30/17: b/L stable nodules\par Last mammogram was 12/29/17: negative. \par EGD 1/19/18: Erosive gastritis\par Colonoscopy 1/19/18: normal. \par \par

## 2020-06-16 NOTE — ASSESSMENT
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 She is currently off prednisone. She is currently improved overall with regards to her RA. Her knee OA is improved.  \par \par Plan:\par RA/+RNP: overall stable. \par -continue plaquenil 200mg BID for seronegative RA/+RNP. G6PD is WNL. She goes to her eye doctor for monitoring q6 months. Had appt with Dr. Molina 12/2019\par -Off MTX and folic acid due to intolerance. \par -remain off prednisone\par -Doing well on Rituxan.  Will continue. Last infusion was 6/11/20.  Tolerating it well.  \par -XR chest 9/2015 reviewed, biapical pleural thickening seen. She saw Dr. Walls. Had PFTs, stable and no evidence of restrictive or obstructive lung disease. \par -She has hx of smoking, distant. Advised continued f/u with Pulm for monitoring of pulmonary nodules.\par routine screening with PFTs and ECHO. \par -prev declined amlodipine for raynaud's. To continue to use conservative measures for hand warming. \par -flu shot given 10/3/19. \par \par \par Left shoulder pain/impingement- not active. \par -Injected right SAB 3/6/20 witih 80mg depomedrol. \par -80mg depomedrol administered to left SAB 12/4/18- didn't help too much\par -Completed shoulder PT\par \par \par Knee OA- doing well today\par -Injected left knee 3/6/20 with 80mg depomedrol after aspirating 8 cc of fluid from left knee. Will send for testing. \par -Completed 5 injections of supartz in b/L knees.  Can do another set starting 12/17/20\par -PT program for knees to help her negotiate stairs better\par \par Vit D deficiency-\par -Completed 50,000IU q week x 4 weeks. currently back on 2000IU daily. \par -Vit D is WNL.\par -Check Vit D with next labs\par \par \par labs reviewed.\par f/u 3 months labs before next visit. \par  \par

## 2020-06-16 NOTE — PROCEDURE
[FreeTextEntry1] : Procedure: b/L knee injection with Supartz 25mg\par Date: 6/16/20\par Indications: therapeutic purposes \par #1 site identified in the right knee.\par \par The procedure risks was discussed with the patient.\par Indications: therapeutic purposes \par #1 site identified in the right knee . Verbal consent was obtained. \par Anesthesia was with ethyl chloride.\par The patient was prepped with betadine solution. \par A 21 gauge 1.5 inch needle was used.\par Injectables: 1mL of xylocaine 1% for local anesthetic. Injected 25mg Supartz to left knee. \par The patient tolerated the procedure well. No fluid was aspirated. \par There were no complications. Handouts/patient instructions were given to patient . patient was instructed to call if redness at site, a decrease in range of motion or an increase in pain is noted after procedure. \par \par #2\par Identical procedure performed for left knee, but prior to supartz injection 6 cc of straw colored fluid aspirated.  Will send for testing.  \par \par

## 2020-06-18 LAB
B PERT IGG+IGM PNL SER: ABNORMAL
COLOR FLD: YELLOW
EOSINOPHIL # FLD MANUAL: 0 %
FLUID INTAKE SUBSTANCE CLASS: NORMAL
LYMPHOCYTES # FLD MANUAL: 37 %
MESOTHL CELL NFR FLD: 0 %
MONOS+MACROS NFR FLD MANUAL: 62 %
NEUTS SEG # FLD MANUAL: 1 %
NRBC # FLD: 0
RBC # FLD MANUAL: 2000 /UL
SYCRY CLARITY: CLEAR
SYCRY COLOR: YELLOW
SYCRY ID: NORMAL
SYCRY TUBE: NORMAL
TOTAL CELLS COUNTED FLD: 450 /UL
TUBE TYPE: NORMAL
UNIDENT CELLS NFR FLD MANUAL: 0 %
VARIANT LYMPHS # FLD MANUAL: 0 %

## 2020-07-02 LAB — BACTERIA FLD CULT: NORMAL

## 2020-08-09 DIAGNOSIS — A69.20 LYME DISEASE, UNSPECIFIED: ICD-10-CM

## 2020-09-03 ENCOUNTER — APPOINTMENT (OUTPATIENT)
Dept: DERMATOLOGY | Facility: CLINIC | Age: 80
End: 2020-09-03
Payer: MEDICARE

## 2020-09-03 VITALS — BODY MASS INDEX: 22.2 KG/M2 | WEIGHT: 130 LBS | HEIGHT: 64 IN

## 2020-09-03 DIAGNOSIS — L28.1 PRURIGO NODULARIS: ICD-10-CM

## 2020-09-03 PROCEDURE — 99213 OFFICE O/P EST LOW 20 MIN: CPT

## 2020-09-03 NOTE — PHYSICAL EXAM
[FreeTextEntry3] : Skin examination performed of the face, neck, chest, hands, lower legs;\par The patient is well, alert and oriented, pleasant and cooperative.\par Eyelids, conjunctivae, oral mucosa, digits and nails all normal.  \par No cervical adenopathy.\par \par R dorsal foot;  pinpoint 1mm regular dark brown macule; \par \par excoriated nodules;  L arm;  scattered

## 2020-09-03 NOTE — HISTORY OF PRESENT ILLNESS
[de-identified] : The patient has been fit in for urgent appointment. \par c/o lesion on R foot, noticed by podiatry; \par \par also;  rashes on arms, itchy, since [place]

## 2020-09-03 NOTE — ASSESSMENT
[FreeTextEntry1] : benign junctional nevus R dorsal foot;  no suspicious features;  \par Continue regular exams;\par \par eczema/prurigo;  L arm;  mometasone spot tx prn;  cover nodules with bandaid

## 2020-09-16 ENCOUNTER — RX RENEWAL (OUTPATIENT)
Age: 80
End: 2020-09-16

## 2020-09-22 ENCOUNTER — APPOINTMENT (OUTPATIENT)
Dept: RHEUMATOLOGY | Facility: CLINIC | Age: 80
End: 2020-09-22
Payer: MEDICARE

## 2020-09-22 VITALS
SYSTOLIC BLOOD PRESSURE: 138 MMHG | BODY MASS INDEX: 22.31 KG/M2 | DIASTOLIC BLOOD PRESSURE: 88 MMHG | HEART RATE: 94 BPM | OXYGEN SATURATION: 96 % | WEIGHT: 130 LBS | TEMPERATURE: 97.6 F

## 2020-09-22 PROCEDURE — 36415 COLL VENOUS BLD VENIPUNCTURE: CPT

## 2020-09-22 PROCEDURE — 99214 OFFICE O/P EST MOD 30 MIN: CPT | Mod: 25

## 2020-09-22 RX ORDER — ATORVASTATIN CALCIUM 40 MG/1
40 TABLET, FILM COATED ORAL
Refills: 0 | Status: DISCONTINUED | COMMUNITY
End: 2020-09-22

## 2020-09-22 RX ORDER — MINOCYCLINE HYDROCHLORIDE 50 MG/1
50 CAPSULE ORAL
Qty: 42 | Refills: 2 | Status: DISCONTINUED | COMMUNITY
Start: 2019-05-22 | End: 2020-09-22

## 2020-09-22 NOTE — ASSESSMENT
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 She is currently off prednisone. She is currently improved overall with regards to her RA. Her knee OA is improved. \par \par Plan:\par RA/+RNP: overall stable. \par -continue plaquenil 200mg BID for seronegative RA/+RNP. G6PD is WNL. She goes to her eye doctor for monitoring q6 months. Had appt with Dr. Molina 12/2019\par -Off MTX and folic acid due to intolerance. \par -remain off prednisone\par -Doing well on Rituxan. Will continue. Last infusion was 6/11/20. Tolerating it well. Next infusion due 12/12/2020\par -XR chest 9/2015 reviewed, biapical pleural thickening seen. She saw Dr. Walls. Had PFTs, stable and no evidence of restrictive or obstructive lung disease. \par -She has hx of smoking, distant. Advised continued f/u with Pulm for monitoring of pulmonary nodules.\par routine screening with PFTs and ECHO. \par -prev declined amlodipine for raynaud's. To continue to use conservative measures for hand warming. \par -flu shot \par \par Left shoulder pain/impingement- not active. \par -Injected right SAB 3/6/20 witih 80mg depomedrol. \par -80mg depomedrol administered to left SAB 12/4/18- didn't help too much\par -Completed shoulder PT\par \par Left back pain\par -Rx tizanidine 2mg QHS\par Knee OA- doing well today\par -Injected left knee 3/6/20 with 80mg depomedrol after aspirating 8 cc of fluid from left knee. Will send for testing. \par -Completed 5 injections of supartz in b/L knees. Can do another set starting 12/17/20\par -PT program for knees to help her negotiate stairs better\par \par Vit D deficiency-\par -Completed 50,000IU q week x 4 weeks. currently back on 2000IU daily. \par -Vit D is WNL.\par -Check Vit D with next labs\par -Next DEXA due 12/13/2020\par \par labs reviewed.\par f/u 3-4 months labs before next visit. \par  \par  \par

## 2020-09-22 NOTE — HISTORY OF PRESENT ILLNESS
[FreeTextEntry1] : 80 y/o woman with hx of GERD, COPD/asthmatic component, borderline elevated HTN, HLD on crestor, degenerative disc disease of spine, and on adderall for paralytic sleep and attention deficit, here for f/u of her inflammatory arthritis. I am treating her for seronegative RA/with +RNP/-GABBY. She is on plaquenil 200mg BID and Rituxan. Rituxan started 4/2019. \par \par Today she c/o right knee left back pain.  Hands are quiet.\par She is happy with the PLQ/Rituxan combination. \par \par Cancer screening:\par CT chest in 10/30/17: b/L stable nodules\par Last mammogram was 12/2019: negative. \par EGD 1/19/18: Erosive gastritis\par Colonoscopy 1/19/18: normal.

## 2020-09-24 LAB
25(OH)D3 SERPL-MCNC: 29.2 NG/ML
ALBUMIN SERPL ELPH-MCNC: 4.6 G/DL
ALP BLD-CCNC: 106 U/L
ALT SERPL-CCNC: 11 U/L
ANION GAP SERPL CALC-SCNC: 13 MMOL/L
AST SERPL-CCNC: 23 U/L
BASOPHILS # BLD AUTO: 0.11 K/UL
BASOPHILS NFR BLD AUTO: 1.3 %
BILIRUB SERPL-MCNC: 0.4 MG/DL
BUN SERPL-MCNC: 24 MG/DL
C3 SERPL-MCNC: 123 MG/DL
C4 SERPL-MCNC: 43 MG/DL
CALCIUM SERPL-MCNC: 9.5 MG/DL
CHLORIDE SERPL-SCNC: 104 MMOL/L
CO2 SERPL-SCNC: 24 MMOL/L
CREAT SERPL-MCNC: 0.84 MG/DL
CREAT SPEC-SCNC: 139 MG/DL
CREAT/PROT UR: 0.1 RATIO
CRP SERPL-MCNC: 0.32 MG/DL
EOSINOPHIL # BLD AUTO: 0.33 K/UL
EOSINOPHIL NFR BLD AUTO: 3.9 %
ERYTHROCYTE [SEDIMENTATION RATE] IN BLOOD BY WESTERGREN METHOD: 3 MM/HR
GLUCOSE SERPL-MCNC: 87 MG/DL
HCT VFR BLD CALC: 46.5 %
HGB BLD-MCNC: 14.1 G/DL
IMM GRANULOCYTES NFR BLD AUTO: 0.5 %
LYMPHOCYTES # BLD AUTO: 1.6 K/UL
LYMPHOCYTES NFR BLD AUTO: 18.8 %
MAN DIFF?: NORMAL
MCHC RBC-ENTMCNC: 30.3 GM/DL
MCHC RBC-ENTMCNC: 30.4 PG
MCV RBC AUTO: 100.2 FL
MONOCYTES # BLD AUTO: 0.59 K/UL
MONOCYTES NFR BLD AUTO: 6.9 %
NEUTROPHILS # BLD AUTO: 5.82 K/UL
NEUTROPHILS NFR BLD AUTO: 68.6 %
PLATELET # BLD AUTO: 289 K/UL
POTASSIUM SERPL-SCNC: 4.6 MMOL/L
PROT SERPL-MCNC: 6.8 G/DL
PROT UR-MCNC: 18 MG/DL
RBC # BLD: 4.64 M/UL
RBC # FLD: 13.7 %
SODIUM SERPL-SCNC: 141 MMOL/L
WBC # FLD AUTO: 8.49 K/UL

## 2020-09-25 LAB — DSDNA AB SER-ACNC: <12 IU/ML

## 2020-10-05 RX ORDER — ACETAMINOPHEN 325 MG/1
325 TABLET ORAL
Qty: 0 | Refills: 0 | Status: COMPLETED | OUTPATIENT
Start: 2020-10-05 | End: 1900-01-01

## 2020-10-05 RX ORDER — CETIRIZINE HYDROCHLORIDE 10 MG/1
10 TABLET, COATED ORAL
Qty: 0 | Refills: 0 | Status: COMPLETED | OUTPATIENT
Start: 2020-10-05 | End: 1900-01-01

## 2020-10-05 RX ORDER — METHYLPREDNISOLONE 125 MG/2ML
125 INJECTION, POWDER, LYOPHILIZED, FOR SOLUTION INTRAMUSCULAR; INTRAVENOUS
Qty: 0 | Refills: 0 | Status: COMPLETED | OUTPATIENT
Start: 2020-10-05 | End: 1900-01-01

## 2020-10-19 RX ORDER — AZITHROMYCIN 200 MG/5ML
200 POWDER, FOR SUSPENSION ORAL DAILY
Qty: 1 | Refills: 2 | Status: COMPLETED | COMMUNITY
Start: 2018-02-14 | End: 2020-10-19

## 2020-10-23 DIAGNOSIS — J04.10 ACUTE TRACHEITIS W/OUT OBSTRUCTION: ICD-10-CM

## 2020-10-26 ENCOUNTER — APPOINTMENT (OUTPATIENT)
Dept: INTERNAL MEDICINE | Facility: CLINIC | Age: 80
End: 2020-10-26
Payer: MEDICARE

## 2020-10-26 VITALS
OXYGEN SATURATION: 95 % | TEMPERATURE: 97.9 F | BODY MASS INDEX: 22.31 KG/M2 | DIASTOLIC BLOOD PRESSURE: 74 MMHG | RESPIRATION RATE: 18 BRPM | WEIGHT: 130 LBS | HEART RATE: 76 BPM | SYSTOLIC BLOOD PRESSURE: 138 MMHG

## 2020-10-26 DIAGNOSIS — R73.03 PREDIABETES.: ICD-10-CM

## 2020-10-26 DIAGNOSIS — M25.562 PAIN IN LEFT KNEE: ICD-10-CM

## 2020-10-26 DIAGNOSIS — J20.8 ACUTE BRONCHITIS DUE TO OTHER SPECIFIED ORGANISMS: ICD-10-CM

## 2020-10-26 DIAGNOSIS — M19.90 UNSPECIFIED OSTEOARTHRITIS, UNSPECIFIED SITE: ICD-10-CM

## 2020-10-26 DIAGNOSIS — Z87.2 PERSONAL HISTORY OF DISEASES OF THE SKIN AND SUBCUTANEOUS TISSUE: ICD-10-CM

## 2020-10-26 DIAGNOSIS — B96.89 ACUTE BRONCHITIS DUE TO OTHER SPECIFIED ORGANISMS: ICD-10-CM

## 2020-10-26 DIAGNOSIS — Z86.19 PERSONAL HISTORY OF OTHER INFECTIOUS AND PARASITIC DISEASES: ICD-10-CM

## 2020-10-26 DIAGNOSIS — Z41.1 ENCOUNTER FOR COSMETIC SURGERY: ICD-10-CM

## 2020-10-26 DIAGNOSIS — Z23 ENCOUNTER FOR IMMUNIZATION: ICD-10-CM

## 2020-10-26 DIAGNOSIS — Z92.29 PERSONAL HISTORY OF OTHER DRUG THERAPY: ICD-10-CM

## 2020-10-26 DIAGNOSIS — M25.512 PAIN IN LEFT SHOULDER: ICD-10-CM

## 2020-10-26 DIAGNOSIS — Z87.09 PERSONAL HISTORY OF OTHER DISEASES OF THE RESPIRATORY SYSTEM: ICD-10-CM

## 2020-10-26 DIAGNOSIS — R53.82 CHRONIC FATIGUE, UNSPECIFIED: ICD-10-CM

## 2020-10-26 PROCEDURE — G0008: CPT

## 2020-10-26 PROCEDURE — 90662 IIV NO PRSV INCREASED AG IM: CPT

## 2020-10-26 PROCEDURE — 99214 OFFICE O/P EST MOD 30 MIN: CPT | Mod: 25

## 2020-10-26 RX ORDER — AZITHROMYCIN 250 MG/1
250 TABLET, FILM COATED ORAL
Qty: 1 | Refills: 1 | Status: DISCONTINUED | COMMUNITY
Start: 2020-10-23 | End: 2020-10-26

## 2020-10-26 RX ORDER — PREDNISONE 20 MG/1
20 TABLET ORAL TWICE DAILY
Qty: 12 | Refills: 2 | Status: DISCONTINUED | COMMUNITY
Start: 2020-10-19 | End: 2020-10-26

## 2020-10-26 NOTE — HISTORY OF PRESENT ILLNESS
[FreeTextEntry1] : Followup [de-identified] : Ms. Fine presents for a followup evaluation. She continues to followup with Dr. Parada for rheumatology. Patient denies any chest pain or palpitations. She does get some shortness of breath with exertion. Ms. Fine continues that joint pain. She has no nocturnal symptoms of cough or shortness of breath. Patient denies any hematuria dysuria.

## 2020-10-26 NOTE — PLAN
[FreeTextEntry1] : Ms. Fine presents for a followup evaluation. She does have joint pain. Patient states followup with rheumatology. She continued on current medication regimen which has been reviewed and revised. Patient will receive the flu vaccine. She has declined the pneumonia vaccine at present. Patient again a professor comprehensive blood profile. She will followup in 6 months for complete physical examination. Ms. Fine will also be sent for a CT scan of the chest. She has a previous history of pulmonary nodules and has a risk of interstitial lung disease secondary to rheumatoid arthritis and mixed connective tissue disease.

## 2020-10-27 ENCOUNTER — NON-APPOINTMENT (OUTPATIENT)
Age: 80
End: 2020-10-27

## 2020-10-28 ENCOUNTER — NON-APPOINTMENT (OUTPATIENT)
Age: 80
End: 2020-10-28

## 2020-10-28 LAB
25(OH)D3 SERPL-MCNC: 44.2 NG/ML
ALBUMIN SERPL ELPH-MCNC: 4.6 G/DL
ALP BLD-CCNC: 115 U/L
ALT SERPL-CCNC: 16 U/L
ANION GAP SERPL CALC-SCNC: 13 MMOL/L
AST SERPL-CCNC: 19 U/L
BASOPHILS # BLD AUTO: 0.1 K/UL
BASOPHILS NFR BLD AUTO: 0.9 %
BILIRUB SERPL-MCNC: 0.4 MG/DL
BUN SERPL-MCNC: 22 MG/DL
CALCIUM SERPL-MCNC: 9.3 MG/DL
CHLORIDE SERPL-SCNC: 101 MMOL/L
CHOLEST SERPL-MCNC: 212 MG/DL
CO2 SERPL-SCNC: 27 MMOL/L
CREAT SERPL-MCNC: 0.83 MG/DL
EOSINOPHIL # BLD AUTO: 0.36 K/UL
EOSINOPHIL NFR BLD AUTO: 3.1 %
ESTIMATED AVERAGE GLUCOSE: 114 MG/DL
GLUCOSE SERPL-MCNC: 93 MG/DL
HBA1C MFR BLD HPLC: 5.6 %
HCT VFR BLD CALC: 43.9 %
HDLC SERPL-MCNC: 98 MG/DL
HGB BLD-MCNC: 13.8 G/DL
IMM GRANULOCYTES NFR BLD AUTO: 1 %
IRON SERPL-MCNC: 64 UG/DL
LDLC SERPL CALC-MCNC: 100 MG/DL
LYMPHOCYTES # BLD AUTO: 2.01 K/UL
LYMPHOCYTES NFR BLD AUTO: 17.4 %
MAN DIFF?: NORMAL
MCHC RBC-ENTMCNC: 30.3 PG
MCHC RBC-ENTMCNC: 31.4 GM/DL
MCV RBC AUTO: 96.5 FL
MONOCYTES # BLD AUTO: 0.91 K/UL
MONOCYTES NFR BLD AUTO: 7.9 %
NEUTROPHILS # BLD AUTO: 8.05 K/UL
NEUTROPHILS NFR BLD AUTO: 69.7 %
NONHDLC SERPL-MCNC: 114 MG/DL
PLATELET # BLD AUTO: 320 K/UL
POTASSIUM SERPL-SCNC: 4.2 MMOL/L
PROT SERPL-MCNC: 6.9 G/DL
RBC # BLD: 4.55 M/UL
RBC # FLD: 13.5 %
SODIUM SERPL-SCNC: 141 MMOL/L
TRIGL SERPL-MCNC: 71 MG/DL
TSH SERPL-ACNC: 2.84 UIU/ML
WBC # FLD AUTO: 11.54 K/UL

## 2020-11-05 ENCOUNTER — OUTPATIENT (OUTPATIENT)
Dept: OUTPATIENT SERVICES | Facility: HOSPITAL | Age: 80
LOS: 1 days | End: 2020-11-05
Payer: MEDICARE

## 2020-11-05 ENCOUNTER — APPOINTMENT (OUTPATIENT)
Dept: CT IMAGING | Facility: CLINIC | Age: 80
End: 2020-11-05
Payer: MEDICARE

## 2020-11-05 DIAGNOSIS — M35.1 OTHER OVERLAP SYNDROMES: ICD-10-CM

## 2020-11-05 DIAGNOSIS — R91.8 OTHER NONSPECIFIC ABNORMAL FINDING OF LUNG FIELD: ICD-10-CM

## 2020-11-05 PROCEDURE — 71250 CT THORAX DX C-: CPT | Mod: 26

## 2020-11-05 PROCEDURE — 71250 CT THORAX DX C-: CPT

## 2020-12-10 ENCOUNTER — RX RENEWAL (OUTPATIENT)
Age: 80
End: 2020-12-10

## 2020-12-14 ENCOUNTER — RESULT REVIEW (OUTPATIENT)
Age: 80
End: 2020-12-14

## 2020-12-14 ENCOUNTER — APPOINTMENT (OUTPATIENT)
Dept: MAMMOGRAPHY | Facility: CLINIC | Age: 80
End: 2020-12-14
Payer: MEDICARE

## 2020-12-14 ENCOUNTER — APPOINTMENT (OUTPATIENT)
Dept: RADIOLOGY | Facility: CLINIC | Age: 80
End: 2020-12-14
Payer: MEDICARE

## 2020-12-14 ENCOUNTER — OUTPATIENT (OUTPATIENT)
Dept: OUTPATIENT SERVICES | Facility: HOSPITAL | Age: 80
LOS: 1 days | End: 2020-12-14
Payer: MEDICARE

## 2020-12-14 DIAGNOSIS — Z00.00 ENCOUNTER FOR GENERAL ADULT MEDICAL EXAMINATION WITHOUT ABNORMAL FINDINGS: ICD-10-CM

## 2020-12-14 DIAGNOSIS — M06.00 RHEUMATOID ARTHRITIS WITHOUT RHEUMATOID FACTOR, UNSPECIFIED SITE: ICD-10-CM

## 2020-12-14 PROCEDURE — 77080 DXA BONE DENSITY AXIAL: CPT | Mod: 26

## 2020-12-14 PROCEDURE — 77063 BREAST TOMOSYNTHESIS BI: CPT | Mod: 26

## 2020-12-14 PROCEDURE — 77067 SCR MAMMO BI INCL CAD: CPT

## 2020-12-14 PROCEDURE — 77067 SCR MAMMO BI INCL CAD: CPT | Mod: 26

## 2020-12-14 PROCEDURE — 77080 DXA BONE DENSITY AXIAL: CPT

## 2020-12-14 PROCEDURE — 77063 BREAST TOMOSYNTHESIS BI: CPT

## 2020-12-16 ENCOUNTER — APPOINTMENT (OUTPATIENT)
Dept: RHEUMATOLOGY | Facility: CLINIC | Age: 80
End: 2020-12-16
Payer: MEDICARE

## 2020-12-16 VITALS
TEMPERATURE: 97.4 F | SYSTOLIC BLOOD PRESSURE: 124 MMHG | HEART RATE: 68 BPM | OXYGEN SATURATION: 98 % | RESPIRATION RATE: 16 BRPM | DIASTOLIC BLOOD PRESSURE: 76 MMHG

## 2020-12-16 VITALS
DIASTOLIC BLOOD PRESSURE: 82 MMHG | TEMPERATURE: 97.4 F | SYSTOLIC BLOOD PRESSURE: 152 MMHG | HEART RATE: 81 BPM | RESPIRATION RATE: 15 BRPM | OXYGEN SATURATION: 96 %

## 2020-12-16 VITALS
OXYGEN SATURATION: 95 % | HEART RATE: 72 BPM | TEMPERATURE: 96.8 F | DIASTOLIC BLOOD PRESSURE: 74 MMHG | RESPIRATION RATE: 14 BRPM | SYSTOLIC BLOOD PRESSURE: 128 MMHG

## 2020-12-16 VITALS
TEMPERATURE: 97.9 F | HEART RATE: 74 BPM | RESPIRATION RATE: 16 BRPM | DIASTOLIC BLOOD PRESSURE: 79 MMHG | OXYGEN SATURATION: 95 % | SYSTOLIC BLOOD PRESSURE: 144 MMHG

## 2020-12-16 VITALS
DIASTOLIC BLOOD PRESSURE: 74 MMHG | RESPIRATION RATE: 14 BRPM | OXYGEN SATURATION: 96 % | SYSTOLIC BLOOD PRESSURE: 125 MMHG | TEMPERATURE: 97.4 F | HEART RATE: 71 BPM

## 2020-12-16 VITALS
OXYGEN SATURATION: 96 % | DIASTOLIC BLOOD PRESSURE: 77 MMHG | SYSTOLIC BLOOD PRESSURE: 142 MMHG | RESPIRATION RATE: 16 BRPM | HEART RATE: 81 BPM

## 2020-12-16 PROCEDURE — 96415 CHEMO IV INFUSION ADDL HR: CPT

## 2020-12-16 PROCEDURE — 96413 CHEMO IV INFUSION 1 HR: CPT

## 2020-12-16 PROCEDURE — 96375 TX/PRO/DX INJ NEW DRUG ADDON: CPT

## 2020-12-16 RX ORDER — RITUXIMAB 10 MG/ML
500 INJECTION, SOLUTION INTRAVENOUS
Qty: 0 | Refills: 0 | Status: COMPLETED | OUTPATIENT
Start: 2020-10-05

## 2020-12-16 RX ORDER — METHYLPREDNISOLONE 125 MG/2ML
125 INJECTION, POWDER, LYOPHILIZED, FOR SOLUTION INTRAMUSCULAR; INTRAVENOUS
Qty: 0 | Refills: 0 | Status: COMPLETED | OUTPATIENT
Start: 2020-12-10

## 2020-12-16 RX ORDER — CETIRIZINE HYDROCHLORIDE 10 MG/1
10 TABLET, COATED ORAL
Qty: 0 | Refills: 0 | Status: COMPLETED | OUTPATIENT
Start: 2020-12-10

## 2020-12-16 RX ORDER — ACETAMINOPHEN 325 MG/1
325 TABLET ORAL
Qty: 0 | Refills: 0 | Status: COMPLETED | OUTPATIENT
Start: 2020-12-10

## 2020-12-31 ENCOUNTER — APPOINTMENT (OUTPATIENT)
Dept: RHEUMATOLOGY | Facility: CLINIC | Age: 80
End: 2020-12-31
Payer: MEDICARE

## 2020-12-31 VITALS
DIASTOLIC BLOOD PRESSURE: 86 MMHG | RESPIRATION RATE: 16 BRPM | SYSTOLIC BLOOD PRESSURE: 146 MMHG | OXYGEN SATURATION: 96 % | HEART RATE: 70 BPM | TEMPERATURE: 97.3 F

## 2020-12-31 VITALS
OXYGEN SATURATION: 99 % | RESPIRATION RATE: 16 BRPM | DIASTOLIC BLOOD PRESSURE: 78 MMHG | SYSTOLIC BLOOD PRESSURE: 133 MMHG | HEART RATE: 76 BPM

## 2020-12-31 VITALS
TEMPERATURE: 97.8 F | OXYGEN SATURATION: 97 % | HEART RATE: 80 BPM | SYSTOLIC BLOOD PRESSURE: 119 MMHG | DIASTOLIC BLOOD PRESSURE: 78 MMHG | RESPIRATION RATE: 16 BRPM

## 2020-12-31 VITALS
SYSTOLIC BLOOD PRESSURE: 146 MMHG | RESPIRATION RATE: 16 BRPM | HEART RATE: 92 BPM | DIASTOLIC BLOOD PRESSURE: 87 MMHG | OXYGEN SATURATION: 97 % | TEMPERATURE: 97.3 F

## 2020-12-31 VITALS
SYSTOLIC BLOOD PRESSURE: 136 MMHG | TEMPERATURE: 97.4 F | HEART RATE: 73 BPM | OXYGEN SATURATION: 96 % | DIASTOLIC BLOOD PRESSURE: 80 MMHG | RESPIRATION RATE: 16 BRPM

## 2020-12-31 PROCEDURE — 96413 CHEMO IV INFUSION 1 HR: CPT

## 2020-12-31 PROCEDURE — 96375 TX/PRO/DX INJ NEW DRUG ADDON: CPT

## 2020-12-31 PROCEDURE — 96415 CHEMO IV INFUSION ADDL HR: CPT

## 2020-12-31 RX ORDER — METHYLPREDNISOLONE 125 MG/2ML
125 INJECTION, POWDER, LYOPHILIZED, FOR SOLUTION INTRAMUSCULAR; INTRAVENOUS
Qty: 0 | Refills: 0 | Status: COMPLETED | OUTPATIENT
Start: 2020-12-24

## 2020-12-31 RX ORDER — CETIRIZINE HYDROCHLORIDE 10 MG/1
10 TABLET, COATED ORAL
Qty: 0 | Refills: 0 | Status: COMPLETED | OUTPATIENT
Start: 2020-12-24

## 2020-12-31 RX ORDER — RITUXIMAB 10 MG/ML
500 INJECTION, SOLUTION INTRAVENOUS
Qty: 0 | Refills: 0 | Status: COMPLETED | OUTPATIENT
Start: 2020-10-05

## 2020-12-31 RX ORDER — ACETAMINOPHEN 325 MG/1
325 TABLET ORAL
Qty: 0 | Refills: 0 | Status: COMPLETED | OUTPATIENT
Start: 2020-12-24

## 2021-01-05 ENCOUNTER — APPOINTMENT (OUTPATIENT)
Dept: RHEUMATOLOGY | Facility: CLINIC | Age: 81
End: 2021-01-05
Payer: MEDICARE

## 2021-01-05 ENCOUNTER — NON-APPOINTMENT (OUTPATIENT)
Age: 81
End: 2021-01-05

## 2021-01-05 VITALS
WEIGHT: 130 LBS | OXYGEN SATURATION: 98 % | SYSTOLIC BLOOD PRESSURE: 122 MMHG | DIASTOLIC BLOOD PRESSURE: 72 MMHG | BODY MASS INDEX: 22.31 KG/M2 | HEART RATE: 78 BPM | TEMPERATURE: 97.9 F

## 2021-01-05 PROCEDURE — 99214 OFFICE O/P EST MOD 30 MIN: CPT | Mod: 25

## 2021-01-05 PROCEDURE — 36415 COLL VENOUS BLD VENIPUNCTURE: CPT

## 2021-01-05 NOTE — HISTORY OF PRESENT ILLNESS
[FreeTextEntry1] : 79 y/o woman with hx of GERD, COPD/asthmatic component, borderline elevated HTN, HLD on crestor, degenerative disc disease of spine, and on adderall for paralytic sleep and attention deficit, here for f/u of her inflammatory arthritis. I am treating her for seronegative RA/with +RNP/-GABBY. She is on plaquenil 200mg BID and Rituxan. Rituxan started 4/2019. \par \par Today she c/o right knee left back pain. Hands are quiet.\par She is happy with the PLQ/Rituxan combination. \par \par Denies SOB.  Has occasional cough.\par Had WBC elevation in 10/2020.  Was advised by Dr. Walls to repeat, however patient did not go for repeat.  It will be repeated today.  \par She had CT chest  11/05/20 which shows bronchiectasis, few ill-defined opacities in peripheral aspect, scattered tree in bud opacities within lungs.  \par \par Cancer screening:\par CT chest in 10/30/17: b/L stable nodules\par Last mammogram was 12/2019: negative. \par EGD 1/19/18: Erosive gastritis\par Colonoscopy 1/19/18: normal. \par  \par

## 2021-01-05 NOTE — ASSESSMENT
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 She is currently off prednisone. She is currently improved overall with regards to her RA. Her knee OA is slightly active.  \par \par Plan:\par RA/+RNP: overall stable. \par -continue plaquenil 200mg BID for seronegative RA/+RNP. G6PD is WNL. She goes to her eye doctor for monitoring q6 months. Had appt with Dr. Molina 12/2019\par -Off MTX and folic acid due to intolerance. \par -remain off prednisone\par -Doing well on Rituxan. Last infusion was 12/31/20. Tolerating it well. \par -XR chest 9/2015 reviewed, biapical pleural thickening seen. She saw Dr. Walls. Had PFTs, stable and no evidence of restrictive or obstructive lung disease. Recent CT chest as above.  Will need OK from pulmonary re ?myocbacterium in CT chest report before continuing any future Rituxan infusions.  \par -She has hx of smoking, distant.  Cont f/u with Pulm for monitoring of pulmonary nodules.\par -routine screening with PFTs and ECHO. \par -prev declined amlodipine for raynaud's. To continue to use conservative measures for hand warming.  \par \par Left shoulder pain/impingement- not active. \par -Injected right SAB 3/6/20 witih 80mg depomedrol. \par -80mg depomedrol administered to left SAB 12/4/18- didn't help too much\par -Completed shoulder PT\par \par Left back pain\par -Rx tizanidine 2mg QHS\par Knee OA- doing well today\par -Injected left knee 3/6/20 with 80mg depomedrol after aspirating 8 cc of fluid from left knee. Will send for testing. \par -Completed 5 injections of supartz in b/L knees.  She is due again and would like to schedule her Supartz injections.  \par -PT program for knees to help her negotiate stairs better\par \par Vit D deficiency/clinical osteoporosis\par -Completed 50,000IU q week x 4 weeks. currently back on 2000IU daily. \par -Vit D is WNL.\par -Check Vit D with next labs\par -DEXA shows osteopenia.  With her tibia fracture, she has diagnosis of clinical osteoporosis.  \par -Will Rx Prolia\par \par Patient had Qs regarding covid vaccine\par -Informed her we do not have data re: Rituxan and it's effect on covid vaccination effectivity.  There may be reduced effectiveness.\par \par labs reviewed.\par f/u for supartz injections\par Next routine RPA 3 months\par  \par  \par

## 2021-01-06 LAB
ALBUMIN SERPL ELPH-MCNC: 4.3 G/DL
ALP BLD-CCNC: 105 U/L
ALT SERPL-CCNC: 13 U/L
ANION GAP SERPL CALC-SCNC: 14 MMOL/L
AST SERPL-CCNC: 18 U/L
BASOPHILS # BLD AUTO: 0.1 K/UL
BASOPHILS NFR BLD AUTO: 1.2 %
BILIRUB SERPL-MCNC: 0.3 MG/DL
BUN SERPL-MCNC: 28 MG/DL
CALCIUM SERPL-MCNC: 9.1 MG/DL
CHLORIDE SERPL-SCNC: 105 MMOL/L
CO2 SERPL-SCNC: 22 MMOL/L
CREAT SERPL-MCNC: 1 MG/DL
CRP SERPL-MCNC: 0.47 MG/DL
EOSINOPHIL # BLD AUTO: 0.38 K/UL
EOSINOPHIL NFR BLD AUTO: 4.4 %
ERYTHROCYTE [SEDIMENTATION RATE] IN BLOOD BY WESTERGREN METHOD: 21 MM/HR
GLUCOSE SERPL-MCNC: 79 MG/DL
HCT VFR BLD CALC: 41.9 %
HGB BLD-MCNC: 13.3 G/DL
IMM GRANULOCYTES NFR BLD AUTO: 0.3 %
LYMPHOCYTES # BLD AUTO: 1.59 K/UL
LYMPHOCYTES NFR BLD AUTO: 18.5 %
MAN DIFF?: NORMAL
MCHC RBC-ENTMCNC: 30.8 PG
MCHC RBC-ENTMCNC: 31.7 GM/DL
MCV RBC AUTO: 97 FL
MONOCYTES # BLD AUTO: 0.58 K/UL
MONOCYTES NFR BLD AUTO: 6.8 %
NEUTROPHILS # BLD AUTO: 5.91 K/UL
NEUTROPHILS NFR BLD AUTO: 68.8 %
PLATELET # BLD AUTO: 294 K/UL
POTASSIUM SERPL-SCNC: 4.7 MMOL/L
PROT SERPL-MCNC: 6.6 G/DL
RBC # BLD: 4.32 M/UL
RBC # FLD: 13.5 %
SODIUM SERPL-SCNC: 141 MMOL/L
WBC # FLD AUTO: 8.59 K/UL

## 2021-01-07 ENCOUNTER — APPOINTMENT (OUTPATIENT)
Dept: RHEUMATOLOGY | Facility: CLINIC | Age: 81
End: 2021-01-07
Payer: MEDICARE

## 2021-01-07 VITALS
OXYGEN SATURATION: 96 % | SYSTOLIC BLOOD PRESSURE: 136 MMHG | HEIGHT: 64 IN | TEMPERATURE: 96.9 F | BODY MASS INDEX: 22.2 KG/M2 | WEIGHT: 130 LBS | HEART RATE: 83 BPM | DIASTOLIC BLOOD PRESSURE: 83 MMHG

## 2021-01-07 PROCEDURE — 20610 DRAIN/INJ JOINT/BURSA W/O US: CPT | Mod: 50

## 2021-01-07 RX ORDER — SODIUM HYALURONATE INTRA-ARTICULAR SOLN PREF SYR 25 MG/2.5ML 25/2.5 MG/ML
25 SOLUTION PREFILLED SYRINGE INTRAARTICULAR
Refills: 0 | Status: COMPLETED | OUTPATIENT
Start: 2021-01-07

## 2021-01-07 RX ADMIN — SODIUM HYALURONATE INTRA-ARTICULAR SOLN PREF SYR 25 MG/2.5ML 0 MG/2.5ML: 25/2.5 SOLUTION PREFILLED SYRINGE at 00:00

## 2021-01-07 NOTE — ASSESSMENT
[FreeTextEntry1] : Injected first of 5 b/L knee injections with Supartz.  \par f/u 1 week for 2nd of 5.

## 2021-01-07 NOTE — PROCEDURE
[FreeTextEntry1] : Procedure: b/L knee injection with Supartz 25mg\par Date: 1/7/21\par Indications: therapeutic purposes \par #1 site identified in the right knee.\par \par The procedure risks was discussed with the patient.\par Indications: therapeutic purposes \par #1 site identified in the right knee . Verbal consent was obtained. \par Anesthesia was with ethyl chloride.\par The patient was prepped with betadine solution. \par A 21 gauge 1.5 inch needle was used.\par Injectables: 1mL of xylocaine 1% for local anesthetic. Injected 25mg Supartz to left knee. \par The patient tolerated the procedure well. No fluid was aspirated. \par There were no complications. Handouts/patient instructions were given to patient . patient was instructed to call if redness at site, a decrease in range of motion or an increase in pain is noted after procedure. \par \par #2\par Identical procedure performed for left knee\par \par

## 2021-01-15 ENCOUNTER — APPOINTMENT (OUTPATIENT)
Dept: RHEUMATOLOGY | Facility: CLINIC | Age: 81
End: 2021-01-15
Payer: MEDICARE

## 2021-01-15 VITALS
WEIGHT: 130 LBS | DIASTOLIC BLOOD PRESSURE: 90 MMHG | SYSTOLIC BLOOD PRESSURE: 130 MMHG | OXYGEN SATURATION: 98 % | HEIGHT: 64 IN | BODY MASS INDEX: 22.2 KG/M2 | HEART RATE: 78 BPM | TEMPERATURE: 97.3 F

## 2021-01-15 PROCEDURE — 20610 DRAIN/INJ JOINT/BURSA W/O US: CPT | Mod: 50

## 2021-01-15 RX ORDER — SODIUM HYALURONATE INTRA-ARTICULAR SOLN PREF SYR 25 MG/2.5ML 25/2.5 MG/ML
25 SOLUTION PREFILLED SYRINGE INTRAARTICULAR
Refills: 0 | Status: COMPLETED | OUTPATIENT
Start: 2021-01-15

## 2021-01-15 RX ADMIN — SODIUM HYALURONATE INTRA-ARTICULAR SOLN PREF SYR 25 MG/2.5ML 0 MG/2.5ML: 25/2.5 SOLUTION PREFILLED SYRINGE at 00:00

## 2021-01-15 NOTE — PROCEDURE
[FreeTextEntry1] : Procedure: b/L knee injection with Supartz 25mg\par Date: 1/15/21\par Indications: therapeutic purposes \par #1 site identified in the right knee.\par \par The procedure risks was discussed with the patient.\par Indications: therapeutic purposes \par #1 site identified in the right knee . Verbal consent was obtained. \par Anesthesia was with ethyl chloride.\par The patient was prepped with betadine solution. \par A 21 gauge 1.5 inch needle was used.\par Injectables: 1mL of xylocaine 1% for local anesthetic. Injected 25mg Supartz to left knee. \par The patient tolerated the procedure well. No fluid was aspirated. \par There were no complications. Handouts/patient instructions were given to patient . patient was instructed to call if redness at site, a decrease in range of motion or an increase in pain is noted after procedure. \par \par #2\par Identical procedure performed for left knee\par \par

## 2021-01-22 ENCOUNTER — APPOINTMENT (OUTPATIENT)
Dept: RHEUMATOLOGY | Facility: CLINIC | Age: 81
End: 2021-01-22
Payer: MEDICARE

## 2021-01-22 VITALS
HEART RATE: 93 BPM | HEIGHT: 64 IN | TEMPERATURE: 96.2 F | WEIGHT: 130 LBS | BODY MASS INDEX: 22.2 KG/M2 | DIASTOLIC BLOOD PRESSURE: 70 MMHG | OXYGEN SATURATION: 96 % | SYSTOLIC BLOOD PRESSURE: 110 MMHG

## 2021-01-22 PROCEDURE — 20610 DRAIN/INJ JOINT/BURSA W/O US: CPT | Mod: 50

## 2021-01-22 RX ORDER — SODIUM HYALURONATE INTRA-ARTICULAR SOLN PREF SYR 25 MG/2.5ML 25/2.5 MG/ML
25 SOLUTION PREFILLED SYRINGE INTRAARTICULAR
Refills: 0 | Status: COMPLETED | OUTPATIENT
Start: 2021-01-22

## 2021-01-22 RX ADMIN — SODIUM HYALURONATE INTRA-ARTICULAR SOLN PREF SYR 25 MG/2.5ML 0 MG/2.5ML: 25/2.5 SOLUTION PREFILLED SYRINGE at 00:00

## 2021-01-22 NOTE — PROCEDURE
[FreeTextEntry1] : Procedure: b/L knee injection with Supartz 25mg\par Date:1/22/21\par Indications: therapeutic purposes \par #1 site identified in the right knee.\par \par The procedure risks was discussed with the patient.\par Indications: therapeutic purposes \par #1 site identified in the right knee . Verbal consent was obtained. \par Anesthesia was with ethyl chloride.\par The patient was prepped with betadine solution. \par A 21 gauge 1.5 inch needle was used.\par Injectables: 1mL of xylocaine 1% for local anesthetic. Injected 25mg Supartz to left knee. \par The patient tolerated the procedure well. No fluid was aspirated. \par There were no complications. Handouts/patient instructions were given to patient . patient was instructed to call if redness at site, a decrease in range of motion or an increase in pain is noted after procedure. \par \par #2\par Identical procedure performed for left knee\par \par

## 2021-01-29 ENCOUNTER — APPOINTMENT (OUTPATIENT)
Dept: RHEUMATOLOGY | Facility: CLINIC | Age: 81
End: 2021-01-29
Payer: MEDICARE

## 2021-01-29 VITALS
SYSTOLIC BLOOD PRESSURE: 122 MMHG | TEMPERATURE: 97.9 F | HEART RATE: 99 BPM | BODY MASS INDEX: 22.31 KG/M2 | DIASTOLIC BLOOD PRESSURE: 72 MMHG | OXYGEN SATURATION: 90 % | WEIGHT: 130 LBS

## 2021-01-29 PROCEDURE — 20610 DRAIN/INJ JOINT/BURSA W/O US: CPT | Mod: 50

## 2021-01-29 RX ORDER — SODIUM HYALURONATE INTRA-ARTICULAR SOLN PREF SYR 25 MG/2.5ML 25/2.5 MG/ML
25 SOLUTION PREFILLED SYRINGE INTRAARTICULAR
Refills: 0 | Status: COMPLETED | OUTPATIENT
Start: 2021-01-29

## 2021-01-29 RX ADMIN — SODIUM HYALURONATE INTRA-ARTICULAR SOLN PREF SYR 25 MG/2.5ML 0 MG/2.5ML: 25/2.5 SOLUTION PREFILLED SYRINGE at 00:00

## 2021-01-29 NOTE — PROCEDURE
[FreeTextEntry1] : Procedure: b/L knee injection with Supartz 25mg\par Date:1/29/21\par Indications: therapeutic purposes \par #1 site identified in the right knee.\par \par The procedure risks was discussed with the patient.\par Indications: therapeutic purposes \par #1 site identified in the right knee . Verbal consent was obtained. \par Anesthesia was with ethyl chloride.\par The patient was prepped with betadine solution. \par A 21 gauge 1.5 inch needle was used.\par Injectables: 1mL of xylocaine 1% for local anesthetic. Injected 25mg Supartz to left knee. \par The patient tolerated the procedure well. No fluid was aspirated. \par There were no complications. Handouts/patient instructions were given to patient . patient was instructed to call if redness at site, a decrease in range of motion or an increase in pain is noted after procedure. \par \par #2\par Identical procedure performed for left knee\par

## 2021-01-29 NOTE — ASSESSMENT
[FreeTextEntry1] : completed 4 of 5 planned b/l knee injections with supartz\par f/u 1 week for final injections.\par  will f/u on prolia with auth team\par

## 2021-02-05 ENCOUNTER — APPOINTMENT (OUTPATIENT)
Dept: RHEUMATOLOGY | Facility: CLINIC | Age: 81
End: 2021-02-05
Payer: MEDICARE

## 2021-02-05 VITALS
OXYGEN SATURATION: 97 % | BODY MASS INDEX: 22.31 KG/M2 | TEMPERATURE: 97.7 F | WEIGHT: 130 LBS | HEART RATE: 99 BPM | SYSTOLIC BLOOD PRESSURE: 122 MMHG | DIASTOLIC BLOOD PRESSURE: 70 MMHG

## 2021-02-05 PROCEDURE — 20610 DRAIN/INJ JOINT/BURSA W/O US: CPT | Mod: 50

## 2021-02-05 RX ORDER — SODIUM HYALURONATE INTRA-ARTICULAR SOLN PREF SYR 25 MG/2.5ML 25/2.5 MG/ML
25 SOLUTION PREFILLED SYRINGE INTRAARTICULAR
Refills: 0 | Status: COMPLETED | OUTPATIENT
Start: 2021-02-05

## 2021-02-05 RX ADMIN — SODIUM HYALURONATE INTRA-ARTICULAR SOLN PREF SYR 25 MG/2.5ML 0 MG/2.5ML: 25/2.5 SOLUTION PREFILLED SYRINGE at 00:00

## 2021-02-05 NOTE — ASSESSMENT
[FreeTextEntry1] : completed 5 planned b/l knee injections with supartz\par f/u for prolia injection.  Patient would like to check her copay with her secondary insurance.  \par . \par

## 2021-02-05 NOTE — PROCEDURE
[FreeTextEntry1] : Procedure: b/L knee injection with Supartz 25mg\par Date:2/5/21\par Indications: therapeutic purposes \par #1 site identified in the right knee.\par \par The procedure risks was discussed with the patient.\par Indications: therapeutic purposes \par #1 site identified in the right knee . Verbal consent was obtained. \par Anesthesia was with ethyl chloride.\par The patient was prepped with betadine solution. \par A 21 gauge 1.5 inch needle was used.\par Injectables: 1mL of xylocaine 1% for local anesthetic. Injected 25mg Supartz to left knee. \par The patient tolerated the procedure well. No fluid was aspirated. \par There were no complications. Handouts/patient instructions were given to patient . patient was instructed to call if redness at site, a decrease in range of motion or an increase in pain is noted after procedure. \par \par #2\par Identical procedure performed for left knee\par

## 2021-02-17 ENCOUNTER — APPOINTMENT (OUTPATIENT)
Dept: DERMATOLOGY | Facility: CLINIC | Age: 81
End: 2021-02-17
Payer: MEDICARE

## 2021-02-17 DIAGNOSIS — D22.71 MELANOCYTIC NEVI OF RIGHT LOWER LIMB, INCLUDING HIP: ICD-10-CM

## 2021-02-17 PROCEDURE — 99213 OFFICE O/P EST LOW 20 MIN: CPT

## 2021-02-17 NOTE — PHYSICAL EXAM
[Alert] : alert [Oriented x 3] : ~L oriented x 3 [Well Nourished] : well nourished [Full Body Skin Exam Performed] : performed [FreeTextEntry3] : A full skin exam was performed including the scalp, face, neck, chest, abdomen, back, buttocks, upper extremities and lower extremities.  The patient declined examination of the breasts and genitalia.  \par The exam did show the following benign growths:\par [place] pigmented nevi - right dorsal foot, 2 mm.\par Seborrheic keratoses.\par Lentigines.\par Cherry angioma.\par \par Right anterior axilla with hard nodule.

## 2021-02-17 NOTE — ASSESSMENT
[FreeTextEntry1] : A complete skin examination was performed.  There is no evidence of skin cancer.  We discussed the importance of photoprotection, including the use of hats, protective clothing and sunscreens with an SPF of at least 30.  Sun avoidance was also discussed.  The ABCDE's of melanoma was discussed.  Regular skin exams recommended.\par \par Right axilla / breast with residual of breast implant, s/p removal of saline.  \par Discussed with patient.

## 2021-02-17 NOTE — HISTORY OF PRESENT ILLNESS
[FreeTextEntry1] : Patient presents for skin examination. [de-identified] : Denies new, changing, bleeding or tender lesions on the skin over the past 6 months.\par

## 2021-02-25 ENCOUNTER — APPOINTMENT (OUTPATIENT)
Dept: RHEUMATOLOGY | Facility: CLINIC | Age: 81
End: 2021-02-25
Payer: MEDICARE

## 2021-02-25 VITALS
BODY MASS INDEX: 22.2 KG/M2 | HEIGHT: 64 IN | SYSTOLIC BLOOD PRESSURE: 120 MMHG | DIASTOLIC BLOOD PRESSURE: 80 MMHG | WEIGHT: 130 LBS | TEMPERATURE: 97 F | OXYGEN SATURATION: 95 % | HEART RATE: 89 BPM

## 2021-02-25 PROCEDURE — 96372 THER/PROPH/DIAG INJ SC/IM: CPT

## 2021-02-25 RX ORDER — DENOSUMAB 60 MG/ML
60 INJECTION SUBCUTANEOUS
Qty: 1 | Refills: 0 | Status: COMPLETED | OUTPATIENT
Start: 2021-02-25

## 2021-02-25 RX ADMIN — DENOSUMAB 0 MG/ML: 60 INJECTION SUBCUTANEOUS at 00:00

## 2021-02-25 NOTE — PROCEDURE
[FreeTextEntry1] : Skin was prepped with alcohol, and 60mg prolia was injected subcutaneously to left arm.  Patient tolerated injection well.\par

## 2021-03-10 ENCOUNTER — RX RENEWAL (OUTPATIENT)
Age: 81
End: 2021-03-10

## 2021-04-06 ENCOUNTER — APPOINTMENT (OUTPATIENT)
Dept: RHEUMATOLOGY | Facility: CLINIC | Age: 81
End: 2021-04-06

## 2021-04-09 ENCOUNTER — APPOINTMENT (OUTPATIENT)
Dept: RHEUMATOLOGY | Facility: CLINIC | Age: 81
End: 2021-04-09
Payer: MEDICARE

## 2021-04-09 VITALS
TEMPERATURE: 97.7 F | BODY MASS INDEX: 22.31 KG/M2 | SYSTOLIC BLOOD PRESSURE: 124 MMHG | WEIGHT: 130 LBS | OXYGEN SATURATION: 98 % | HEART RATE: 78 BPM | DIASTOLIC BLOOD PRESSURE: 78 MMHG
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
## 2021-04-09 PROCEDURE — 36415 COLL VENOUS BLD VENIPUNCTURE: CPT

## 2021-04-09 PROCEDURE — 99214 OFFICE O/P EST MOD 30 MIN: CPT | Mod: 25

## 2021-04-09 RX ORDER — METHYLPREDNISOLONE 4 MG/1
4 TABLET ORAL
Qty: 21 | Refills: 0 | Status: DISCONTINUED | COMMUNITY
Start: 2021-02-04

## 2021-04-09 RX ORDER — CLINDAMYCIN HYDROCHLORIDE 150 MG/1
150 CAPSULE ORAL
Qty: 30 | Refills: 0 | Status: DISCONTINUED | COMMUNITY
Start: 2021-02-04

## 2021-04-09 RX ORDER — CHLORHEXIDINE GLUCONATE, 0.12% ORAL RINSE 1.2 MG/ML
0.12 SOLUTION DENTAL
Qty: 473 | Refills: 0 | Status: DISCONTINUED | COMMUNITY
Start: 2021-02-04

## 2021-04-09 RX ORDER — IBUPROFEN 800 MG/1
800 TABLET, FILM COATED ORAL
Qty: 21 | Refills: 0 | Status: DISCONTINUED | COMMUNITY
Start: 2021-02-04

## 2021-04-09 NOTE — ASSESSMENT
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 She is currently off prednisone. She is currently improved overall with regards to her RA. Her knee OA is slightly active. \par \par Plan:\par RA/+RNP: overall stable. \par -continue plaquenil 200mg BID for seronegative RA/+RNP. G6PD is WNL. She goes to her eye doctor for monitoring q6 months. States she needs to reschedule her appt with Dr. Molina.\par -Off MTX and folic acid due to intolerance. \par -remain off prednisone\par -Doing well on Rituxan. Last infusion was 12/31/20. Tolerating it well.  7/1/2021 she is due again.  \par -XR chest 9/2015 reviewed, biapical pleural thickening seen. She saw Dr. Walls. Had PFTs, stable and no evidence of restrictive or obstructive lung disease. Recent CT chest as above.  Communicated with her pulmonologist, Dr. Walls, who felt ok to cont Rituxan.  CT chest essentially unchanged.  \par -She has hx of smoking, distant. Cont f/u with Pulm for monitoring of pulmonary nodules.\par -routine screening with PFTs and ECHO.  She is seeing her cardiologist next week.  \par -prev declined amlodipine for raynaud's. To continue to use conservative measures for hand warming. \par \par Left shoulder pain/impingement- not active. \par -Injected right SAB 3/6/20 witih 80mg depomedrol. \par -80mg depomedrol administered to left SAB 12/4/18- didn't help too much\par -Completed shoulder PT\par \par Left back pain\par Knee OA- doing well today\par -Injected left knee 3/6/20 with 80mg depomedrol after aspirating 8 cc of fluid from left knee. Will send for testing. \par -Completed 5 injections of supartz in b/L knees.  Completed  2/5/21\par -PT program for back\par \par Vit D deficiency/clinical osteoporosis\par -Completed 50,000IU q week x 4 weeks. currently back on 2000IU daily. \par -Vit D is WNL.\par -Check Vit D with next labs\par -DEXA shows osteopenia. With her tibia fracture, she has diagnosis of clinical osteoporosis. \par -Having dental implant issues.  She is due for Prolia 8/2021, but we need to see what is happening with her dental implant problems.  Ideally dental implant work would take place at end of prolia cycle in 8/2021, however if procedure is urgent/semi-urgent we may have no choice but to do the dental work sooner.  \par \par Tick bite\par -will screen for lyme\par \par labs reviewed, new labs ordered\par Next routine RPA 3 months\par  \par  \par . \par

## 2021-04-15 ENCOUNTER — NON-APPOINTMENT (OUTPATIENT)
Age: 81
End: 2021-04-15

## 2021-04-15 LAB
25(OH)D3 SERPL-MCNC: 47.7 NG/ML
ALBUMIN SERPL ELPH-MCNC: 4.5 G/DL
ALP BLD-CCNC: 107 U/L
ALT SERPL-CCNC: 11 U/L
ANION GAP SERPL CALC-SCNC: 14 MMOL/L
AST SERPL-CCNC: 21 U/L
B BURGDOR AB SER-IMP: POSITIVE
B BURGDOR IGM PATRN SER IB-IMP: NEGATIVE
B BURGDOR18KD IGG SER QL IB: PRESENT
B BURGDOR23KD IGG SER QL IB: PRESENT
B BURGDOR23KD IGM SER QL IB: NORMAL
B BURGDOR28KD IGG SER QL IB: NORMAL
B BURGDOR30KD IGG SER QL IB: NORMAL
B BURGDOR31KD IGG SER QL IB: PRESENT
B BURGDOR39KD IGG SER QL IB: PRESENT
B BURGDOR39KD IGM SER QL IB: NORMAL
B BURGDOR41KD IGG SER QL IB: PRESENT
B BURGDOR41KD IGM SER QL IB: NORMAL
B BURGDOR45KD IGG SER QL IB: NORMAL
B BURGDOR58KD IGG SER QL IB: NORMAL
B BURGDOR66KD IGG SER QL IB: PRESENT
B BURGDOR93KD IGG SER QL IB: PRESENT
BASOPHILS # BLD AUTO: 0.14 K/UL
BASOPHILS NFR BLD AUTO: 1.4 %
BILIRUB SERPL-MCNC: 0.4 MG/DL
BUN SERPL-MCNC: 21 MG/DL
C3 SERPL-MCNC: 119 MG/DL
C4 SERPL-MCNC: 44 MG/DL
CALCIUM SERPL-MCNC: 9.2 MG/DL
CHLORIDE SERPL-SCNC: 105 MMOL/L
CO2 SERPL-SCNC: 23 MMOL/L
CREAT SERPL-MCNC: 0.87 MG/DL
CREAT SPEC-SCNC: 217 MG/DL
CREAT/PROT UR: 0.1 RATIO
CRP SERPL-MCNC: 6 MG/L
EOSINOPHIL # BLD AUTO: 0.5 K/UL
EOSINOPHIL NFR BLD AUTO: 4.9 %
ERYTHROCYTE [SEDIMENTATION RATE] IN BLOOD BY WESTERGREN METHOD: 22 MM/HR
GLUCOSE SERPL-MCNC: 76 MG/DL
HBV CORE IGG+IGM SER QL: NONREACTIVE
HBV SURFACE AB SERPL IA-ACNC: <3 MIU/ML
HBV SURFACE AG SER QL: NONREACTIVE
HCT VFR BLD CALC: 45.7 %
HCV AB SER QL: NONREACTIVE
HCV S/CO RATIO: 0.07 S/CO
HGB BLD-MCNC: 14.1 G/DL
IMM GRANULOCYTES NFR BLD AUTO: 0.4 %
LYMPHOCYTES # BLD AUTO: 1.67 K/UL
LYMPHOCYTES NFR BLD AUTO: 16.4 %
M TB IFN-G BLD-IMP: NEGATIVE
MAN DIFF?: NORMAL
MCHC RBC-ENTMCNC: 29.3 PG
MCHC RBC-ENTMCNC: 30.9 GM/DL
MCV RBC AUTO: 95 FL
MONOCYTES # BLD AUTO: 0.72 K/UL
MONOCYTES NFR BLD AUTO: 7.1 %
NEUTROPHILS # BLD AUTO: 7.09 K/UL
NEUTROPHILS NFR BLD AUTO: 69.8 %
PLATELET # BLD AUTO: 332 K/UL
POTASSIUM SERPL-SCNC: 4.3 MMOL/L
PROT SERPL-MCNC: 7 G/DL
PROT UR-MCNC: 28 MG/DL
QUANTIFERON TB PLUS MITOGEN MINUS NIL: 2.15 IU/ML
QUANTIFERON TB PLUS NIL: 0.02 IU/ML
QUANTIFERON TB PLUS TB1 MINUS NIL: 0.02 IU/ML
QUANTIFERON TB PLUS TB2 MINUS NIL: 0.01 IU/ML
RBC # BLD: 4.81 M/UL
RBC # FLD: 13.3 %
SODIUM SERPL-SCNC: 141 MMOL/L
WBC # FLD AUTO: 10.16 K/UL

## 2021-06-08 RX ORDER — ACETAMINOPHEN 325 MG/1
325 TABLET ORAL
Qty: 0 | Refills: 0 | Status: COMPLETED | OUTPATIENT
Start: 2021-06-08 | End: 1900-01-01

## 2021-06-08 RX ORDER — RITUXIMAB 10 MG/ML
500 INJECTION, SOLUTION INTRAVENOUS
Qty: 0 | Refills: 0 | Status: COMPLETED | OUTPATIENT
Start: 2021-06-08 | End: 1900-01-01

## 2021-06-08 RX ORDER — METHYLPREDNISOLONE 125 MG/2ML
125 INJECTION, POWDER, LYOPHILIZED, FOR SOLUTION INTRAMUSCULAR; INTRAVENOUS
Qty: 0 | Refills: 0 | Status: COMPLETED | OUTPATIENT
Start: 2021-06-08 | End: 1900-01-01

## 2021-06-15 ENCOUNTER — APPOINTMENT (OUTPATIENT)
Dept: RHEUMATOLOGY | Facility: CLINIC | Age: 81
End: 2021-06-15
Payer: MEDICARE

## 2021-06-15 VITALS
DIASTOLIC BLOOD PRESSURE: 69 MMHG | SYSTOLIC BLOOD PRESSURE: 173 MMHG | HEART RATE: 94 BPM | RESPIRATION RATE: 16 BRPM | TEMPERATURE: 97.6 F | OXYGEN SATURATION: 96 %

## 2021-06-15 VITALS
TEMPERATURE: 97.8 F | DIASTOLIC BLOOD PRESSURE: 78 MMHG | RESPIRATION RATE: 16 BRPM | HEART RATE: 87 BPM | SYSTOLIC BLOOD PRESSURE: 134 MMHG | OXYGEN SATURATION: 94 %

## 2021-06-15 VITALS
DIASTOLIC BLOOD PRESSURE: 78 MMHG | OXYGEN SATURATION: 95 % | HEART RATE: 73 BPM | RESPIRATION RATE: 16 BRPM | SYSTOLIC BLOOD PRESSURE: 125 MMHG

## 2021-06-15 VITALS
DIASTOLIC BLOOD PRESSURE: 74 MMHG | HEART RATE: 89 BPM | SYSTOLIC BLOOD PRESSURE: 123 MMHG | OXYGEN SATURATION: 94 % | RESPIRATION RATE: 16 BRPM

## 2021-06-15 VITALS
DIASTOLIC BLOOD PRESSURE: 75 MMHG | OXYGEN SATURATION: 98 % | SYSTOLIC BLOOD PRESSURE: 146 MMHG | RESPIRATION RATE: 16 BRPM | HEART RATE: 102 BPM

## 2021-06-15 PROCEDURE — 96413 CHEMO IV INFUSION 1 HR: CPT

## 2021-06-15 PROCEDURE — 96375 TX/PRO/DX INJ NEW DRUG ADDON: CPT

## 2021-06-15 PROCEDURE — 96415 CHEMO IV INFUSION ADDL HR: CPT

## 2021-06-15 RX ORDER — CETIRIZINE HYDROCHLORIDE 10 MG/1
10 TABLET, COATED ORAL
Qty: 0 | Refills: 0 | Status: COMPLETED | OUTPATIENT
Start: 2021-06-08

## 2021-06-15 RX ORDER — METHYLPREDNISOLONE 125 MG/2ML
125 INJECTION, POWDER, LYOPHILIZED, FOR SOLUTION INTRAMUSCULAR; INTRAVENOUS
Qty: 0 | Refills: 0 | Status: COMPLETED | OUTPATIENT
Start: 2021-06-09

## 2021-06-15 RX ORDER — ACETAMINOPHEN 325 MG/1
325 TABLET ORAL
Qty: 0 | Refills: 0 | Status: COMPLETED | OUTPATIENT
Start: 2021-06-09

## 2021-06-15 RX ORDER — RITUXIMAB 10 MG/ML
500 INJECTION, SOLUTION INTRAVENOUS
Qty: 0 | Refills: 0 | Status: COMPLETED | OUTPATIENT
Start: 2021-06-08

## 2021-06-29 ENCOUNTER — APPOINTMENT (OUTPATIENT)
Dept: RHEUMATOLOGY | Facility: CLINIC | Age: 81
End: 2021-06-29
Payer: MEDICARE

## 2021-06-29 VITALS
OXYGEN SATURATION: 96 % | RESPIRATION RATE: 17 BRPM | DIASTOLIC BLOOD PRESSURE: 73 MMHG | SYSTOLIC BLOOD PRESSURE: 133 MMHG | TEMPERATURE: 97.1 F | HEART RATE: 84 BPM

## 2021-06-29 VITALS
HEART RATE: 82 BPM | SYSTOLIC BLOOD PRESSURE: 125 MMHG | RESPIRATION RATE: 17 BRPM | DIASTOLIC BLOOD PRESSURE: 78 MMHG | OXYGEN SATURATION: 98 %

## 2021-06-29 VITALS
RESPIRATION RATE: 17 BRPM | OXYGEN SATURATION: 96 % | DIASTOLIC BLOOD PRESSURE: 73 MMHG | SYSTOLIC BLOOD PRESSURE: 133 MMHG | HEART RATE: 91 BPM | TEMPERATURE: 97.3 F

## 2021-06-29 VITALS
OXYGEN SATURATION: 97 % | SYSTOLIC BLOOD PRESSURE: 122 MMHG | TEMPERATURE: 96.1 F | HEART RATE: 91 BPM | RESPIRATION RATE: 18 BRPM | DIASTOLIC BLOOD PRESSURE: 71 MMHG

## 2021-06-29 PROCEDURE — 96375 TX/PRO/DX INJ NEW DRUG ADDON: CPT

## 2021-06-29 PROCEDURE — 96413 CHEMO IV INFUSION 1 HR: CPT

## 2021-06-29 PROCEDURE — 96415 CHEMO IV INFUSION ADDL HR: CPT

## 2021-06-29 RX ORDER — RITUXIMAB 10 MG/ML
500 INJECTION, SOLUTION INTRAVENOUS
Qty: 0 | Refills: 0 | Status: COMPLETED | OUTPATIENT
Start: 2021-06-23

## 2021-06-29 RX ORDER — CETIRIZINE HYDROCHLORIDE 10 MG/1
10 TABLET, COATED ORAL
Qty: 0 | Refills: 0 | Status: COMPLETED | OUTPATIENT
Start: 2021-06-25

## 2021-06-29 RX ORDER — ACETAMINOPHEN 325 MG/1
325 TABLET ORAL
Qty: 0 | Refills: 0 | Status: COMPLETED | OUTPATIENT
Start: 2021-06-23

## 2021-06-29 RX ORDER — METHYLPREDNISOLONE 125 MG/2ML
125 INJECTION, POWDER, LYOPHILIZED, FOR SOLUTION INTRAMUSCULAR; INTRAVENOUS
Qty: 0 | Refills: 0 | Status: COMPLETED | OUTPATIENT
Start: 2021-06-23

## 2021-07-16 ENCOUNTER — APPOINTMENT (OUTPATIENT)
Dept: RHEUMATOLOGY | Facility: CLINIC | Age: 81
End: 2021-07-16
Payer: MEDICARE

## 2021-07-16 VITALS
BODY MASS INDEX: 22.2 KG/M2 | WEIGHT: 130 LBS | HEIGHT: 64 IN | OXYGEN SATURATION: 93 % | HEART RATE: 89 BPM | DIASTOLIC BLOOD PRESSURE: 88 MMHG | SYSTOLIC BLOOD PRESSURE: 140 MMHG | TEMPERATURE: 97.3 F

## 2021-07-16 PROCEDURE — 36415 COLL VENOUS BLD VENIPUNCTURE: CPT

## 2021-07-16 PROCEDURE — 99214 OFFICE O/P EST MOD 30 MIN: CPT | Mod: 25

## 2021-07-16 NOTE — ASSESSMENT
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 She is currently off prednisone. She is currently improved overall with regards to her RA. Her knee OA is slightly active. \par \par Plan:\par RA/+RNP: overall stable. \par -continue plaquenil 200mg BID for seronegative RA/+RNP. G6PD is WNL. She goes to her eye doctor for monitoring q6 months. States she needs to reschedule her appt with Dr. Molina.\par -Off MTX and folic acid due to intolerance. \par -remain off prednisone\par -Doing well on Rituxan.  Last dose 6/29/21.\par -XR chest 9/2015 reviewed, biapical pleural thickening seen. She saw Dr. Walls. Had PFTs, stable and no evidence of restrictive or obstructive lung disease. Recent CT chest as above. Communicated with her pulmonologist, Dr. Walls, who felt ok to cont Rituxan. CT chest essentially unchanged. \par -She has hx of smoking, distant. Cont f/u with Pulm for monitoring of pulmonary nodules.\par -routine screening with PFTs and ECHO. She is seeing her cardiologist next week. \par -prev declined amlodipine for raynaud's. To continue to use conservative measures for hand warming. \par \par Left shoulder pain/impingement- not active. \par -Injected right SAB 3/6/20 witih 80mg depomedrol. \par -80mg depomedrol administered to left SAB 12/4/18- didn't help too much\par -Completed shoulder PT\par \par Left back pain\par Knee OA- doing well today\par -Injected left knee 3/6/20 with 80mg depomedrol after aspirating 8 cc of fluid from left knee. \par -Completed 5 injections of supartz in b/L knees. Completed 2/5/21.  Plan to do them again after 8/5/21\par -PT program for back\par \par Vit D deficiency/clinical osteoporosis\par -Completed 50,000IU q week x 4 weeks. currently back on 2000IU daily. \par -Vit D is WNL.\par -Check Vit D with next labs\par -DEXA shows osteopenia. With her tibia fracture, she has diagnosis of clinical osteoporosis. \par -Having dental implant issues. She is due for Prolia 8/2021, but is having tooth extraction.  Will plan for prolia after tooth extraction is healed.  \par -Will check femoral XR to r/o atypical fem fx.  \par \par labs today\par Next routine RPA 3 months\par  \par \par \par

## 2021-07-16 NOTE — HISTORY OF PRESENT ILLNESS
[FreeTextEntry1] : 79 y/o woman with hx of GERD, COPD/asthmatic component, borderline elevated HTN, HLD on crestor, degenerative disc disease of spine, and on adderall for paralytic sleep and attention deficit, here for f/u of her inflammatory arthritis. I am treating her for seronegative RA/with +RNP/-GABBY. She is on plaquenil 200mg BID and Rituxan. Rituxan started 4/2019. \par \par She takes her Prolia in 8/2021, but has been having dental implant issues. I informed her ideally we can wait for dental implant work to happen in 8/2021 right before her next Prolia injection, however if it is urgent/semi-urgent and needs to get done now, then we have no choice. \par \par She has pain in right femur.  Wondering if she has an atypical femoral fracture from the prolia.  \par \par She had a headache with the vaccine. Now resolved.\par Planning to see her cardiologist next week. \par \par She is happy with the PLQ/Rituxan combination. \par \par \par Denies SOB. Has occasional cough.\par Had WBC elevation in 10/2020. Was advised by Dr. Walls to repeat, however patient did not go for repeat. It will be repeated today. \par She had CT chest 11/05/20 which shows bronchiectasis, few ill-defined opacities in peripheral aspect, scattered tree in bud opacities within lungs. \par \par Cancer screening:\par CT chest in 10/30/17: b/L stable nodules\par Last mammogram was 12/2019: negative. \par EGD 1/19/18: Erosive gastritis\par Colonoscopy 1/19/18: normal. \par  \par

## 2021-07-18 LAB
25(OH)D3 SERPL-MCNC: 55.7 NG/ML
ALBUMIN SERPL ELPH-MCNC: 4.5 G/DL
ALP BLD-CCNC: 87 U/L
ALT SERPL-CCNC: 11 U/L
ANION GAP SERPL CALC-SCNC: 12 MMOL/L
AST SERPL-CCNC: 18 U/L
BASOPHILS # BLD AUTO: 0.12 K/UL
BASOPHILS NFR BLD AUTO: 1.3 %
BILIRUB SERPL-MCNC: 0.4 MG/DL
BUN SERPL-MCNC: 29 MG/DL
C3 SERPL-MCNC: 121 MG/DL
C4 SERPL-MCNC: 46 MG/DL
CALCIUM SERPL-MCNC: 9.9 MG/DL
CHLORIDE SERPL-SCNC: 108 MMOL/L
CO2 SERPL-SCNC: 23 MMOL/L
COVID-19 SPIKE DOMAIN ANTIBODY INTERPRETATION: NEGATIVE
CREAT SERPL-MCNC: 0.91 MG/DL
CRP SERPL-MCNC: 3 MG/L
EOSINOPHIL # BLD AUTO: 0.33 K/UL
EOSINOPHIL NFR BLD AUTO: 3.6 %
ERYTHROCYTE [SEDIMENTATION RATE] IN BLOOD BY WESTERGREN METHOD: 15 MM/HR
GLUCOSE SERPL-MCNC: 74 MG/DL
HCT VFR BLD CALC: 45.6 %
HGB BLD-MCNC: 13.7 G/DL
IMM GRANULOCYTES NFR BLD AUTO: 0.3 %
LYMPHOCYTES # BLD AUTO: 1.57 K/UL
LYMPHOCYTES NFR BLD AUTO: 17 %
MAN DIFF?: NORMAL
MCHC RBC-ENTMCNC: 30 GM/DL
MCHC RBC-ENTMCNC: 30 PG
MCV RBC AUTO: 100 FL
MONOCYTES # BLD AUTO: 0.67 K/UL
MONOCYTES NFR BLD AUTO: 7.3 %
NEUTROPHILS # BLD AUTO: 6.5 K/UL
NEUTROPHILS NFR BLD AUTO: 70.5 %
PLATELET # BLD AUTO: 304 K/UL
POTASSIUM SERPL-SCNC: 4.7 MMOL/L
PROT SERPL-MCNC: 6.9 G/DL
RBC # BLD: 4.56 M/UL
RBC # FLD: 14.2 %
SARS-COV-2 AB SERPL IA-ACNC: 0.4 U/ML
SODIUM SERPL-SCNC: 143 MMOL/L
WBC # FLD AUTO: 9.22 K/UL

## 2021-07-19 ENCOUNTER — NON-APPOINTMENT (OUTPATIENT)
Age: 81
End: 2021-07-19

## 2021-07-19 LAB — DSDNA AB SER-ACNC: <12 IU/ML

## 2021-07-20 ENCOUNTER — OUTPATIENT (OUTPATIENT)
Dept: OUTPATIENT SERVICES | Facility: HOSPITAL | Age: 81
LOS: 1 days | End: 2021-07-20
Payer: MEDICARE

## 2021-07-20 ENCOUNTER — NON-APPOINTMENT (OUTPATIENT)
Age: 81
End: 2021-07-20

## 2021-07-20 ENCOUNTER — APPOINTMENT (OUTPATIENT)
Dept: RADIOLOGY | Facility: CLINIC | Age: 81
End: 2021-07-20
Payer: MEDICARE

## 2021-07-20 DIAGNOSIS — M80.80XA OTHER OSTEOPOROSIS WITH CURRENT PATHOLOGICAL FRACTURE, UNSPECIFIED SITE, INITIAL ENCOUNTER FOR FRACTURE: ICD-10-CM

## 2021-07-20 PROCEDURE — 73552 X-RAY EXAM OF FEMUR 2/>: CPT

## 2021-07-20 PROCEDURE — 73552 X-RAY EXAM OF FEMUR 2/>: CPT | Mod: 26,RT

## 2021-07-23 ENCOUNTER — NON-APPOINTMENT (OUTPATIENT)
Age: 81
End: 2021-07-23

## 2021-08-10 ENCOUNTER — APPOINTMENT (OUTPATIENT)
Dept: RHEUMATOLOGY | Facility: CLINIC | Age: 81
End: 2021-08-10
Payer: MEDICARE

## 2021-08-10 VITALS
DIASTOLIC BLOOD PRESSURE: 77 MMHG | HEIGHT: 64 IN | TEMPERATURE: 96.8 F | WEIGHT: 130 LBS | BODY MASS INDEX: 22.2 KG/M2 | OXYGEN SATURATION: 97 % | SYSTOLIC BLOOD PRESSURE: 146 MMHG | HEART RATE: 82 BPM

## 2021-08-10 DIAGNOSIS — M17.11 UNILATERAL PRIMARY OSTEOARTHRITIS, RIGHT KNEE: ICD-10-CM

## 2021-08-10 DIAGNOSIS — M25.561 PAIN IN RIGHT KNEE: ICD-10-CM

## 2021-08-10 PROCEDURE — 20610 DRAIN/INJ JOINT/BURSA W/O US: CPT | Mod: 50

## 2021-08-10 RX ORDER — METHYLPRED ACET/NACL,ISO-OS/PF 80 MG/ML
80 VIAL (ML) INJECTION
Qty: 1 | Refills: 0 | Status: COMPLETED | OUTPATIENT
Start: 2021-08-10

## 2021-08-10 RX ORDER — SODIUM HYALURONATE INTRA-ARTICULAR SOLN PREF SYR 25 MG/2.5ML 25/2.5 MG/ML
25 SOLUTION PREFILLED SYRINGE INTRAARTICULAR
Refills: 0 | Status: COMPLETED | OUTPATIENT
Start: 2021-08-10

## 2021-08-10 RX ADMIN — SODIUM HYALURONATE INTRA-ARTICULAR SOLN PREF SYR 25 MG/2.5ML 0 MG/2.5ML: 25/2.5 SOLUTION PREFILLED SYRINGE at 00:00

## 2021-08-10 RX ADMIN — METHYLPREDNISOLONE ACETATE 0 MG/ML: 80 INJECTION, SUSPENSION INTRA-ARTICULAR; INTRALESIONAL; INTRAMUSCULAR; SOFT TISSUE at 00:00

## 2021-08-10 NOTE — PROCEDURE
[FreeTextEntry1] : Procedure: b/L knee injection with Supartz 25mg, right knee inject with CS additionally\par Date:08/10/21\par Indications: therapeutic purposes \par \par The procedure risks was discussed with the patient.\par Indications: therapeutic purposes \par #1 site identified in the right knee . Verbal consent was obtained. \par Anesthesia was with ethyl chloride.\par The patient was prepped with betadine solution. \par A 21 gauge 1.5 inch needle was used.\par Injectables: 1mL of xylocaine 1% for local anesthetic. Injected 80mg depomedrol and 25mg Supartz to right knee. \par The patient tolerated the procedure well. No fluid was aspirated. \par There were no complications. Handouts/patient instructions were given to patient . patient was instructed to call if redness at site, a decrease in range of motion or an increase in pain is noted after procedure. \par \par #2 site identified in the right knee . Verbal consent was obtained. \par Anesthesia was with ethyl chloride.\par The patient was prepped with betadine solution. \par A 21 gauge 1.5 inch needle was used.\par Injectables: 1mL of xylocaine 1% for local anesthetic. Injected  25mg Supartz to right knee. \par The patient tolerated the procedure well. No fluid was aspirated. \par There were no complications. Handouts/patient instructions were given to patient . patient was instructed to call if redness at site, a decrease in range of motion or an increase in pain is noted after procedure. \par \par

## 2021-08-10 NOTE — ASSESSMENT
[FreeTextEntry1] : injected b/L knees with supartz,  1/5\par Injected right knee with 80mg depomedrol\par XR right knee

## 2021-08-17 ENCOUNTER — APPOINTMENT (OUTPATIENT)
Dept: RHEUMATOLOGY | Facility: CLINIC | Age: 81
End: 2021-08-17
Payer: MEDICARE

## 2021-08-17 VITALS
DIASTOLIC BLOOD PRESSURE: 80 MMHG | HEART RATE: 94 BPM | SYSTOLIC BLOOD PRESSURE: 140 MMHG | WEIGHT: 130 LBS | TEMPERATURE: 97.6 F | OXYGEN SATURATION: 97 % | BODY MASS INDEX: 22.2 KG/M2 | HEIGHT: 64 IN

## 2021-08-17 PROCEDURE — 20610 DRAIN/INJ JOINT/BURSA W/O US: CPT | Mod: 50

## 2021-08-17 RX ORDER — MOMETASONE FUROATE 1 MG/G
0.1 OINTMENT TOPICAL
Qty: 1 | Refills: 1 | Status: DISCONTINUED | COMMUNITY
Start: 2020-09-03 | End: 2021-08-17

## 2021-08-17 RX ORDER — SODIUM HYALURONATE INTRA-ARTICULAR SOLN PREF SYR 25 MG/2.5ML 25/2.5 MG/ML
25 SOLUTION PREFILLED SYRINGE INTRAARTICULAR
Refills: 0 | Status: COMPLETED | OUTPATIENT
Start: 2021-08-17

## 2021-08-17 RX ADMIN — SODIUM HYALURONATE INTRA-ARTICULAR SOLN PREF SYR 25 MG/2.5ML 0 MG/2.5ML: 25/2.5 SOLUTION PREFILLED SYRINGE at 00:00

## 2021-08-18 NOTE — PROCEDURE
[FreeTextEntry1] : Procedure: b/L knee injection with Supartz 25mg, right knee inject with CS additionally\par Date:08/17/21\par Indications: therapeutic purposes \par \par The procedure risks was discussed with the patient.\par Indications: therapeutic purposes \par #1 site identified in the right knee . Verbal consent was obtained. \par Anesthesia was with ethyl chloride.\par The patient was prepped with betadine solution. \par A 21 gauge 1.5 inch needle was used.\par Injectables: 1mL of xylocaine 1% for local anesthetic and 25mg Supartz to right knee. \par The patient tolerated the procedure well. No fluid was aspirated. \par There were no complications. Handouts/patient instructions were given to patient . patient was instructed to call if redness at site, a decrease in range of motion or an increase in pain is noted after procedure. \par \par #2 site identified in the right knee . Verbal consent was obtained. \par Anesthesia was with ethyl chloride.\par The patient was prepped with betadine solution. \par A 21 gauge 1.5 inch needle was used.\par Injectables: 1mL of xylocaine 1% for local anesthetic. Injected 25mg Supartz to right knee. \par The patient tolerated the procedure well. No fluid was aspirated. \par There were no complications. Handouts/patient instructions were given to patient . patient was instructed to call if redness at site, a decrease in range of motion or an increase in pain is noted after procedure. \par \par \par

## 2021-08-18 NOTE — ASSESSMENT
[FreeTextEntry1] : Injected b/L knees with supartz 2/5\par XR right knee is still pending\par f/u 1 week for 3rd set of injections

## 2021-08-24 ENCOUNTER — APPOINTMENT (OUTPATIENT)
Dept: RHEUMATOLOGY | Facility: CLINIC | Age: 81
End: 2021-08-24
Payer: MEDICARE

## 2021-08-24 VITALS
TEMPERATURE: 96.8 F | SYSTOLIC BLOOD PRESSURE: 124 MMHG | HEART RATE: 100 BPM | HEIGHT: 64 IN | WEIGHT: 130 LBS | BODY MASS INDEX: 22.2 KG/M2 | DIASTOLIC BLOOD PRESSURE: 69 MMHG | OXYGEN SATURATION: 96 %

## 2021-08-24 PROCEDURE — 20610 DRAIN/INJ JOINT/BURSA W/O US: CPT | Mod: 50

## 2021-08-31 ENCOUNTER — APPOINTMENT (OUTPATIENT)
Dept: RHEUMATOLOGY | Facility: CLINIC | Age: 81
End: 2021-08-31
Payer: MEDICARE

## 2021-08-31 VITALS
HEART RATE: 89 BPM | TEMPERATURE: 97.6 F | BODY MASS INDEX: 22.31 KG/M2 | SYSTOLIC BLOOD PRESSURE: 146 MMHG | DIASTOLIC BLOOD PRESSURE: 93 MMHG | WEIGHT: 130 LBS | OXYGEN SATURATION: 99 %

## 2021-08-31 PROCEDURE — 20610 DRAIN/INJ JOINT/BURSA W/O US: CPT | Mod: 50

## 2021-08-31 RX ORDER — SODIUM HYALURONATE INTRA-ARTICULAR SOLN PREF SYR 25 MG/2.5ML 25/2.5 MG/ML
25 SOLUTION PREFILLED SYRINGE INTRAARTICULAR
Refills: 0 | Status: COMPLETED | OUTPATIENT
Start: 2021-08-31

## 2021-08-31 RX ADMIN — SODIUM HYALURONATE INTRA-ARTICULAR SOLN PREF SYR 25 MG/2.5ML 0 MG/2.5ML: 25/2.5 SOLUTION PREFILLED SYRINGE at 00:00

## 2021-08-31 NOTE — ASSESSMENT
[FreeTextEntry1] : Injected 4/5 b/L supartz injections to knees.  \par prolia next week\par voltaren gel for right 1st mcp \par

## 2021-08-31 NOTE — PROCEDURE
[FreeTextEntry1] : Procedure: b/L knee injection with Supartz 25mg, right knee inject with CS additionally\par Date:08/31/21\par Indications: therapeutic purposes \par \par The procedure risks was discussed with the patient.\par Indications: therapeutic purposes \par #1 site identified in the right knee . Verbal consent was obtained. \par Anesthesia was with ethyl chloride.\par The patient was prepped with betadine solution. \par A 21 gauge 1.5 inch needle was used.\par Injectables: 1mL of xylocaine 1% for local anesthetic and 25mg Supartz to right knee. \par The patient tolerated the procedure well. No fluid was aspirated. \par There were no complications. Handouts/patient instructions were given to patient . patient was instructed to call if redness at site, a decrease in range of motion or an increase in pain is noted after procedure. \par \par #2 site identified in the right knee . Verbal consent was obtained. \par Anesthesia was with ethyl chloride.\par The patient was prepped with betadine solution. \par A 21 gauge 1.5 inch needle was used.\par Injectables: 1mL of xylocaine 1% for local anesthetic. Injected 25mg Supartz to right knee. \par The patient tolerated the procedure well. No fluid was aspirated. \par There were no complications. Handouts/patient instructions were given to patient . patient was instructed to call if redness at site, a decrease in range of motion or an increase in pain is noted after procedure. \par

## 2021-09-07 ENCOUNTER — APPOINTMENT (OUTPATIENT)
Dept: RHEUMATOLOGY | Facility: CLINIC | Age: 81
End: 2021-09-07
Payer: MEDICARE

## 2021-09-07 VITALS
HEART RATE: 86 BPM | WEIGHT: 130 LBS | BODY MASS INDEX: 22.31 KG/M2 | OXYGEN SATURATION: 96 % | TEMPERATURE: 97.6 F | DIASTOLIC BLOOD PRESSURE: 76 MMHG | SYSTOLIC BLOOD PRESSURE: 136 MMHG

## 2021-09-07 PROCEDURE — 20610 DRAIN/INJ JOINT/BURSA W/O US: CPT | Mod: 50

## 2021-09-07 PROCEDURE — 96372 THER/PROPH/DIAG INJ SC/IM: CPT | Mod: 59

## 2021-09-07 RX ADMIN — SODIUM HYALURONATE INTRA-ARTICULAR SOLN PREF SYR 25 MG/2.5ML 0 MG/2.5ML: 25/2.5 SOLUTION PREFILLED SYRINGE at 00:00

## 2021-09-07 RX ADMIN — DENOSUMAB 0 MG/ML: 60 INJECTION SUBCUTANEOUS at 00:00

## 2021-09-07 NOTE — PROCEDURE
[FreeTextEntry1] : Procedure: b/L knee injection with Supartz 25mg, right knee inject with CS additionally\par Date:09/07/21\par Indications: therapeutic purposes \par \par The procedure risks was discussed with the patient.\par Indications: therapeutic purposes \par #1 site identified in the right knee . Verbal consent was obtained. \par Anesthesia was with ethyl chloride.\par The patient was prepped with betadine solution. \par A 21 gauge 1.5 inch needle was used.\par Injectables: 1mL of xylocaine 1% for local anesthetic and 25mg Supartz to right knee. \par The patient tolerated the procedure well. No fluid was aspirated. \par There were no complications. Handouts/patient instructions were given to patient . patient was instructed to call if redness at site, a decrease in range of motion or an increase in pain is noted after procedure. \par \par #2 site identified in the right knee . Verbal consent was obtained. \par Anesthesia was with ethyl chloride.\par The patient was prepped with betadine solution. \par A 21 gauge 1.5 inch needle was used.\par Injectables: 1mL of xylocaine 1% for local anesthetic. Injected 25mg Supartz to right knee. \par The patient tolerated the procedure well. No fluid was aspirated. \par There were no complications. Handouts/patient instructions were given to patient . patient was instructed to call if redness at site, a decrease in range of motion or an increase in pain is noted after procedure. \par \par  \par #3\par Skin was prepped with alcohol, and 60mg of Prolia was injected subQ.  Patient tolerated injection well.\par

## 2021-09-07 NOTE — ASSESSMENT
[FreeTextEntry1] : Completed 5/5 supartz injections to b/L knee\par Prolia injection today 9/7/21.  Would be do again in 6 months

## 2021-10-19 ENCOUNTER — APPOINTMENT (OUTPATIENT)
Dept: RHEUMATOLOGY | Facility: CLINIC | Age: 81
End: 2021-10-19
Payer: MEDICARE

## 2021-10-19 VITALS
DIASTOLIC BLOOD PRESSURE: 80 MMHG | SYSTOLIC BLOOD PRESSURE: 140 MMHG | HEART RATE: 94 BPM | TEMPERATURE: 97.8 F | WEIGHT: 130 LBS | OXYGEN SATURATION: 92 % | BODY MASS INDEX: 22.2 KG/M2 | HEIGHT: 64 IN

## 2021-10-19 DIAGNOSIS — R26.81 UNSTEADINESS ON FEET: ICD-10-CM

## 2021-10-19 PROCEDURE — 99214 OFFICE O/P EST MOD 30 MIN: CPT | Mod: 25

## 2021-10-19 PROCEDURE — 36415 COLL VENOUS BLD VENIPUNCTURE: CPT

## 2021-10-19 NOTE — HISTORY OF PRESENT ILLNESS
[FreeTextEntry1] : 79 y/o woman with hx of GERD, COPD/asthmatic component, borderline elevated HTN, HLD on crestor, degenerative disc disease of spine, and on adderall for paralytic sleep and attention deficit, here for f/u of her inflammatory arthritis. I am treating her for seronegative RA/with +RNP/-GABBY. She is on plaquenil 200mg BID and Rituxan. Rituxan started 4/2019. \par \par \par She is happy with the PLQ/Rituxan combination. \par \par Her right knee is feeling better compared to last visit.  \par She had MRI R knee 9/29/21 which showed small effusion, tricompartmental OA.  \par \par She has been using voltaren on her hands and knees which is helpful.  Her right MCP is occ painful, but she notes she does a lot of work with her right hand. \par Overall she is happy with her regimen.  \par Wants to delay Rituxan for her Booster. \par \par States she is bringing up some mucous lately.   \par Has occasional cough.\par \par \par She had CT chest 11/05/20 which shows bronchiectasis, few ill-defined opacities in peripheral aspect, scattered tree in bud opacities within lungs. \par \par Cancer screening:\par CT chest in 10/30/17: b/L stable nodules\par Last mammogram was 12/2019: negative. \par EGD 1/19/18: Erosive gastritis\par Colonoscopy 1/19/18: normal. \par  \par \par

## 2021-10-19 NOTE — ASSESSMENT
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 She is currently off prednisone.  Her RA is relatively stable.  \par Her OA is improved.  \par \par Plan:\par RA/+RNP: overall stable. \par -continue plaquenil 200mg BID for seronegative RA/+RNP. G6PD is WNL. She goes to her eye doctor for monitoring q6 months. States she needs to reschedule her appt with Dr. Molina.  Reminded her of this. \par -Off MTX and folic acid due to intolerance. \par -remain off prednisone\par -Doing well on Rituxan. Last dose 6/29/21.  Wants to delay the next by a few weeks due to wanting to get covid booster in december.  \par -XR chest 9/2015 reviewed, biapical pleural thickening seen. Recent CT chest as above. Communicated with her pulmonologist, Dr. Walls, who felt ok to cont Rituxan. CT chest essentially unchanged.  She has some increased mucous lately.  Advised she f/u with Dr. Walls again.  \par -She has hx of smoking, distant. Cont f/u with Pulm for monitoring of pulmonary nodules.\par -routine screening with PFTs and ECHO.\par -prev declined amlodipine for raynaud's. To continue to use conservative measures for hand warming. \par \par Right shoulder limited ROM-\par -prevent adhesive capsulitis\par -Plan for PT right shoulder\par \par Left shoulder pain/impingement- not active. \par -Injected right SAB 3/6/20 witih 80mg depomedrol. \par -80mg depomedrol administered to left SAB 12/4/18- didn't help too much\par \par \par Knee OA- doing well today\par -Injected left knee 3/6/20 with 80mg depomedrol after aspirating 8 cc of fluid from left knee. \par -Completed 5 injections of supartz in b/L knees. Completed 2/5/21. Plan to do them again after 8/5/21\par -PT program for back\par \par Vit D deficiency/clinical osteoporosis\par -Completed 50,000IU q week x 4 weeks. currently back on 2000IU daily. \par -Vit D is WNL.\par -Check Vit D with next labs\par -DEXA shows osteopenia. With her tibia fracture, she has diagnosis of clinical osteoporosis. \par -Last prolia was 9/7/2021.  Due again 03/08/2022.  \par \par \par labs today\par Next routine RPA 3-4 months\par  \par \par

## 2021-10-20 LAB
ALBUMIN SERPL ELPH-MCNC: 4.4 G/DL
ALP BLD-CCNC: 92 U/L
ALT SERPL-CCNC: 11 U/L
ANION GAP SERPL CALC-SCNC: 12 MMOL/L
AST SERPL-CCNC: 20 U/L
BASOPHILS # BLD AUTO: 0.1 K/UL
BASOPHILS NFR BLD AUTO: 1.1 %
BILIRUB SERPL-MCNC: 0.4 MG/DL
BUN SERPL-MCNC: 25 MG/DL
C3 SERPL-MCNC: 113 MG/DL
C4 SERPL-MCNC: 44 MG/DL
CALCIUM SERPL-MCNC: 9.8 MG/DL
CHLORIDE SERPL-SCNC: 104 MMOL/L
CO2 SERPL-SCNC: 23 MMOL/L
CREAT SERPL-MCNC: 0.75 MG/DL
CRP SERPL-MCNC: 6 MG/L
DSDNA AB SER-ACNC: <12 IU/ML
EOSINOPHIL # BLD AUTO: 0.3 K/UL
EOSINOPHIL NFR BLD AUTO: 3.3 %
ERYTHROCYTE [SEDIMENTATION RATE] IN BLOOD BY WESTERGREN METHOD: 15 MM/HR
GLUCOSE SERPL-MCNC: 90 MG/DL
HCT VFR BLD CALC: 44.3 %
HGB BLD-MCNC: 13.6 G/DL
IMM GRANULOCYTES NFR BLD AUTO: 0.2 %
LYMPHOCYTES # BLD AUTO: 1.28 K/UL
LYMPHOCYTES NFR BLD AUTO: 14.1 %
MAN DIFF?: NORMAL
MCHC RBC-ENTMCNC: 30.5 PG
MCHC RBC-ENTMCNC: 30.7 GM/DL
MCV RBC AUTO: 99.3 FL
MONOCYTES # BLD AUTO: 0.68 K/UL
MONOCYTES NFR BLD AUTO: 7.5 %
NEUTROPHILS # BLD AUTO: 6.7 K/UL
NEUTROPHILS NFR BLD AUTO: 73.8 %
PLATELET # BLD AUTO: 321 K/UL
POTASSIUM SERPL-SCNC: 4.8 MMOL/L
PROT SERPL-MCNC: 7 G/DL
RBC # BLD: 4.46 M/UL
RBC # FLD: 13.8 %
SODIUM SERPL-SCNC: 139 MMOL/L
WBC # FLD AUTO: 9.08 K/UL

## 2021-11-08 ENCOUNTER — APPOINTMENT (OUTPATIENT)
Dept: INTERNAL MEDICINE | Facility: CLINIC | Age: 81
End: 2021-11-08
Payer: MEDICARE

## 2021-11-08 VITALS
OXYGEN SATURATION: 99 % | HEART RATE: 96 BPM | WEIGHT: 130 LBS | RESPIRATION RATE: 16 BRPM | BODY MASS INDEX: 22.2 KG/M2 | DIASTOLIC BLOOD PRESSURE: 86 MMHG | HEIGHT: 64 IN | SYSTOLIC BLOOD PRESSURE: 138 MMHG | TEMPERATURE: 96.9 F

## 2021-11-08 DIAGNOSIS — R05.1 ACUTE COUGH: ICD-10-CM

## 2021-11-08 DIAGNOSIS — R10.13 EPIGASTRIC PAIN: ICD-10-CM

## 2021-11-08 PROCEDURE — 99214 OFFICE O/P EST MOD 30 MIN: CPT

## 2021-11-08 RX ORDER — DOXYCYCLINE HYCLATE 100 MG/1
100 TABLET ORAL TWICE DAILY
Qty: 14 | Refills: 0 | Status: DISCONTINUED | COMMUNITY
Start: 2021-11-08 | End: 2021-11-08

## 2021-11-08 RX ORDER — TIZANIDINE 2 MG/1
2 TABLET ORAL
Qty: 60 | Refills: 0 | Status: DISCONTINUED | COMMUNITY
Start: 2020-09-22 | End: 2021-11-08

## 2021-11-08 NOTE — PLAN
[FreeTextEntry1] : \par Start Zpack\par \par Do CXR\par \par Abdominal US R/O GB disease.  \par \par Start pantoprazole 40 mg qd.\par \par RFBW to include amylase, lipase.\par \par SEt up FU  with PFT. \par \par Case reviewed with .

## 2021-11-08 NOTE — PHYSICAL EXAM
[Normal] : no acute distress, well nourished, well developed and well-appearing [Normal Oropharynx] : the oropharynx was normal [Supple] : supple [Normal Rate] : normal rate  [Regular Rhythm] : with a regular rhythm [No Edema] : there was no peripheral edema [No Extremity Clubbing/Cyanosis] : no extremity clubbing/cyanosis [Soft] : abdomen soft [Non Tender] : non-tender [Normal Bowel Sounds] : normal bowel sounds [de-identified] : mild epigastric tenderness.  No guarding or rebound  [de-identified] : basilar crackles

## 2021-11-08 NOTE — HISTORY OF PRESENT ILLNESS
[FreeTextEntry8] : Coughing intermittently  for the past several weeks. cough is occasionally productive of yellow.  She has stable JARQUIN without wheeze.  No nasal congestion,PND, fevers, chest pain or chills.  No nocturnal symptoms.  \par \par Also notes after she eats she has upper abdominal/epigastric discomfort  for the past month.  Taking Pepto bismol which has been helping.  No melena, N/V,fevers,weight loss, frequent nsaid  or Etoh use.  \par \par Follows closely with .  On rituxan and Plaquenil.

## 2021-11-10 LAB
25(OH)D3 SERPL-MCNC: 48.7 NG/ML
ALBUMIN SERPL ELPH-MCNC: 4.3 G/DL
ALP BLD-CCNC: 84 U/L
ALT SERPL-CCNC: 9 U/L
AMYLASE/CREAT SERPL: 97 U/L
ANION GAP SERPL CALC-SCNC: 12 MMOL/L
AST SERPL-CCNC: 18 U/L
BASOPHILS # BLD AUTO: 0.1 K/UL
BASOPHILS NFR BLD AUTO: 1.2 %
BILIRUB SERPL-MCNC: 0.3 MG/DL
BUN SERPL-MCNC: 21 MG/DL
CALCIUM SERPL-MCNC: 9.1 MG/DL
CHLORIDE SERPL-SCNC: 105 MMOL/L
CHOLEST SERPL-MCNC: 225 MG/DL
CO2 SERPL-SCNC: 24 MMOL/L
CREAT SERPL-MCNC: 0.79 MG/DL
EOSINOPHIL # BLD AUTO: 0.32 K/UL
EOSINOPHIL NFR BLD AUTO: 4 %
ESTIMATED AVERAGE GLUCOSE: 114 MG/DL
GLUCOSE SERPL-MCNC: 101 MG/DL
HBA1C MFR BLD HPLC: 5.6 %
HCT VFR BLD CALC: 41.5 %
HDLC SERPL-MCNC: 79 MG/DL
HGB BLD-MCNC: 13 G/DL
IMM GRANULOCYTES NFR BLD AUTO: 0.2 %
LDLC SERPL CALC-MCNC: 134 MG/DL
LPL SERPL-CCNC: 29 U/L
LYMPHOCYTES # BLD AUTO: 1.82 K/UL
LYMPHOCYTES NFR BLD AUTO: 22.6 %
MAN DIFF?: NORMAL
MCHC RBC-ENTMCNC: 31 PG
MCHC RBC-ENTMCNC: 31.3 GM/DL
MCV RBC AUTO: 99 FL
MONOCYTES # BLD AUTO: 0.59 K/UL
MONOCYTES NFR BLD AUTO: 7.3 %
NEUTROPHILS # BLD AUTO: 5.2 K/UL
NEUTROPHILS NFR BLD AUTO: 64.7 %
NONHDLC SERPL-MCNC: 146 MG/DL
PLATELET # BLD AUTO: 296 K/UL
POTASSIUM SERPL-SCNC: 4.1 MMOL/L
PROT SERPL-MCNC: 6.6 G/DL
RBC # BLD: 4.19 M/UL
RBC # FLD: 13.5 %
SODIUM SERPL-SCNC: 141 MMOL/L
TRIGL SERPL-MCNC: 58 MG/DL
TSH SERPL-ACNC: 3.08 UIU/ML
WBC # FLD AUTO: 8.05 K/UL

## 2021-11-11 LAB
APPEARANCE: CLEAR
BACTERIA: NEGATIVE
BILIRUBIN URINE: NEGATIVE
BLOOD URINE: NEGATIVE
COLOR: NORMAL
GLUCOSE QUALITATIVE U: NEGATIVE
KETONES URINE: NEGATIVE
LEUKOCYTE ESTERASE URINE: ABNORMAL
MICROSCOPIC-UA: NORMAL
NITRITE URINE: NEGATIVE
PH URINE: 6
PROTEIN URINE: ABNORMAL
RED BLOOD CELLS URINE: 2 /HPF
SPECIFIC GRAVITY URINE: >=1.03
SQUAMOUS EPITHELIAL CELLS: 2 /HPF
UROBILINOGEN URINE: NORMAL
WHITE BLOOD CELLS URINE: 2 /HPF

## 2021-11-23 ENCOUNTER — APPOINTMENT (OUTPATIENT)
Dept: ULTRASOUND IMAGING | Facility: CLINIC | Age: 81
End: 2021-11-23
Payer: MEDICARE

## 2021-11-23 ENCOUNTER — APPOINTMENT (OUTPATIENT)
Dept: RADIOLOGY | Facility: CLINIC | Age: 81
End: 2021-11-23
Payer: MEDICARE

## 2021-11-23 ENCOUNTER — OUTPATIENT (OUTPATIENT)
Dept: OUTPATIENT SERVICES | Facility: HOSPITAL | Age: 81
LOS: 1 days | End: 2021-11-23
Payer: MEDICARE

## 2021-11-23 DIAGNOSIS — R10.13 EPIGASTRIC PAIN: ICD-10-CM

## 2021-11-23 DIAGNOSIS — R05.1 ACUTE COUGH: ICD-10-CM

## 2021-11-23 PROCEDURE — 71046 X-RAY EXAM CHEST 2 VIEWS: CPT | Mod: 26

## 2021-11-23 PROCEDURE — 71046 X-RAY EXAM CHEST 2 VIEWS: CPT

## 2021-11-23 PROCEDURE — 76700 US EXAM ABDOM COMPLETE: CPT | Mod: 26

## 2021-11-23 PROCEDURE — 76700 US EXAM ABDOM COMPLETE: CPT

## 2021-11-30 ENCOUNTER — NON-APPOINTMENT (OUTPATIENT)
Age: 81
End: 2021-11-30

## 2021-12-17 DIAGNOSIS — Z20.822 ENCOUNTER FOR PREPROCEDURAL LABORATORY EXAMINATION: ICD-10-CM

## 2021-12-17 DIAGNOSIS — Z01.812 ENCOUNTER FOR PREPROCEDURAL LABORATORY EXAMINATION: ICD-10-CM

## 2021-12-27 ENCOUNTER — APPOINTMENT (OUTPATIENT)
Dept: INTERNAL MEDICINE | Facility: CLINIC | Age: 81
End: 2021-12-27
Payer: MEDICARE

## 2021-12-27 PROCEDURE — 99442: CPT | Mod: 95

## 2022-02-08 ENCOUNTER — APPOINTMENT (OUTPATIENT)
Dept: RHEUMATOLOGY | Facility: CLINIC | Age: 82
End: 2022-02-08
Payer: MEDICARE

## 2022-02-08 VITALS
WEIGHT: 130 LBS | OXYGEN SATURATION: 96 % | HEART RATE: 86 BPM | DIASTOLIC BLOOD PRESSURE: 76 MMHG | BODY MASS INDEX: 22.31 KG/M2 | SYSTOLIC BLOOD PRESSURE: 122 MMHG | TEMPERATURE: 97.6 F

## 2022-02-08 PROCEDURE — 99214 OFFICE O/P EST MOD 30 MIN: CPT | Mod: 25

## 2022-02-08 PROCEDURE — 36415 COLL VENOUS BLD VENIPUNCTURE: CPT

## 2022-02-08 RX ORDER — PANTOPRAZOLE 40 MG/1
40 TABLET, DELAYED RELEASE ORAL DAILY
Qty: 30 | Refills: 0 | Status: DISCONTINUED | COMMUNITY
Start: 2021-11-08 | End: 2022-02-08

## 2022-02-08 NOTE — ASSESSMENT
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 She is currently off prednisone. Her RA is relatively stable. \par Her OA is improved. \par \par Plan:\par RA/+RNP: overall stable. \par -continue plaquenil 200mg BID for seronegative RA/+RNP. G6PD is WNL. She goes to her eye doctor for monitoring q6 months.Had her last eye exam 12/2021\par -Off MTX and folic acid due to intolerance. \par -remain off prednisone\par -Doing well on Rituxan. Last dose 6/29/21. Wants to delay the next one as long as she can due to covid.  We will check her Covid antibodies.  She is interested in Evusheld if she qualifies.  \par -XR chest 9/2015 reviewed, biapical pleural thickening seen. Recent CT chest as above. Communicated with her pulmonologist, Dr. Walls, who felt ok to cont Rituxan. CT chest essentially unchanged. She has some increased mucous lately. Advised she f/u with Dr. Walls again.  She is planning to have PFTs in the next few months.  \par -She has hx of smoking, distant. Cont f/u with Pulm for monitoring of pulmonary nodules.\par -routine screening with PFTs and ECHO.\par -prev declined amlodipine for raynaud's. To continue to use conservative measures for hand warming. \par \par Right shoulder limited ROM-\par -prevent adhesive capsulitis\par -Plan for PT right shoulder\par \par Left shoulder pain/impingement- not active. \par -Injected right SAB 3/6/20 witih 80mg depomedrol. \par -80mg depomedrol administered to left SAB 12/4/18- didn't help too much\par \par \par Knee OA- doing well today\par -Injected left knee 3/6/20 with 80mg depomedrol after aspirating 8 cc of fluid from left knee. \par -Completed 5 injections of supartz in b/L knees 9/7/21.  \par -PT program for back\par \par Vit D deficiency/clinical osteoporosis\par -Completed 50,000IU q week x 4 weeks. currently back on 2000IU daily. \par -Vit D is WNL.\par -Check Vit D with next labs\par -DEXA shows osteopenia. With her tibia fracture, she has diagnosis of clinical osteoporosis. \par -Last prolia was 9/7/2021. Due again 03/08/2022. \par \par \par labs today\par Next routine RPA 3-4 months\par  \par \par . \par \par

## 2022-02-08 NOTE — HISTORY OF PRESENT ILLNESS
[FreeTextEntry1] : 82 y/o woman with hx of GERD, COPD/asthmatic component, borderline elevated HTN, HLD on crestor, degenerative disc disease of spine, and on adderall for paralytic sleep and attention deficit, here for f/u of her inflammatory arthritis. I am treating her for seronegative RA/with +RNP/-GABBY. She is on plaquenil 200mg BID and Rituxan. Rituxan started 4/2019. \par \par \par She had been happy with the PLQ/Rituxan combination.  Last Rituxan was 6/29/21\par \par Current pain is 8/10 in intensity with 15 minutes of AM stiffness. \par \par She had cs injection to right knee by Dr. Vasquez.  That was 2 weeks ago.  It feels much better now. \par \par Dec 24 she had covid test.  She goes in for breathing test and they tell her she has covid.  She had had her Booster since 12/15/21.  \par But there was a question because it says the test was ordered by Dr. Mc Lakhani.  \par \par She had some abd pain, they ordered uS abd which showed fatty liver. \par \par She saw Dr. Daily.  Was told not to take pantoprazole.  States it didn't help her anyway.  Was told to take pepto Bismol.   \par \par She had CT chest 11/05/20 which shows bronchiectasis, few ill-defined opacities in peripheral aspect, scattered tree in bud opacities within lungs. \par \par Cancer screening:\par CT chest in 10/30/17: b/L stable nodules  11/11/2020: bronchiectasis, ill-defined opacities\par  mammogram was 12/2019: negative. \par EGD 1/19/18: Erosive gastritis\par Colonoscopy 1/19/18: normal. \par

## 2022-02-09 LAB
ALBUMIN SERPL ELPH-MCNC: 4.6 G/DL
ALP BLD-CCNC: 87 U/L
ALT SERPL-CCNC: 15 U/L
ANION GAP SERPL CALC-SCNC: 14 MMOL/L
AST SERPL-CCNC: 23 U/L
BASOPHILS # BLD AUTO: 0.1 K/UL
BASOPHILS NFR BLD AUTO: 1 %
BILIRUB SERPL-MCNC: 0.3 MG/DL
BUN SERPL-MCNC: 23 MG/DL
C3 SERPL-MCNC: 109 MG/DL
C4 SERPL-MCNC: 40 MG/DL
CALCIUM SERPL-MCNC: 9.5 MG/DL
CCP AB SER IA-ACNC: <8 UNITS
CELLS.CD3-CD19+/CELLS IN BLOOD: <1 %
CHLORIDE SERPL-SCNC: 108 MMOL/L
CO2 SERPL-SCNC: 22 MMOL/L
COVID-19 SPIKE DOMAIN ANTIBODY INTERPRETATION: NEGATIVE
CREAT SERPL-MCNC: 0.84 MG/DL
CRP SERPL-MCNC: <3 MG/L
DSDNA AB SER-ACNC: <12 IU/ML
ENA RNP AB SER IA-ACNC: 0.7 AL
ENA SM AB SER IA-ACNC: <0.2 AL
ENA SS-A AB SER IA-ACNC: <0.2 AL
ENA SS-B AB SER IA-ACNC: <0.2 AL
EOSINOPHIL # BLD AUTO: 0.31 K/UL
EOSINOPHIL NFR BLD AUTO: 3.1 %
ERYTHROCYTE [SEDIMENTATION RATE] IN BLOOD BY WESTERGREN METHOD: 12 MM/HR
GLUCOSE SERPL-MCNC: 87 MG/DL
HCT VFR BLD CALC: 44 %
HGB BLD-MCNC: 13.5 G/DL
IMM GRANULOCYTES NFR BLD AUTO: 0.3 %
LYMPHOCYTES # BLD AUTO: 1.47 K/UL
LYMPHOCYTES NFR BLD AUTO: 14.6 %
MAN DIFF?: NORMAL
MCHC RBC-ENTMCNC: 30.5 PG
MCHC RBC-ENTMCNC: 30.7 GM/DL
MCV RBC AUTO: 99.3 FL
MONOCYTES # BLD AUTO: 0.61 K/UL
MONOCYTES NFR BLD AUTO: 6.1 %
NEUTROPHILS # BLD AUTO: 7.52 K/UL
NEUTROPHILS NFR BLD AUTO: 74.9 %
PLATELET # BLD AUTO: 314 K/UL
POTASSIUM SERPL-SCNC: 4.3 MMOL/L
PROT SERPL-MCNC: 7 G/DL
RBC # BLD: 4.43 M/UL
RBC # FLD: 13.7 %
RF+CCP IGG SER-IMP: NEGATIVE
RHEUMATOID FACT SER QL: <10 IU/ML
SARS-COV-2 AB SERPL IA-ACNC: 0.4 U/ML
SODIUM SERPL-SCNC: 144 MMOL/L
WBC # FLD AUTO: 10.04 K/UL

## 2022-02-10 LAB
CREAT SPEC-SCNC: 70 MG/DL
CREAT/PROT UR: 0.2 RATIO
PROT UR-MCNC: 11 MG/DL

## 2022-02-12 LAB — ANA SER IF-ACNC: NEGATIVE

## 2022-02-14 ENCOUNTER — APPOINTMENT (OUTPATIENT)
Dept: DERMATOLOGY | Facility: CLINIC | Age: 82
End: 2022-02-14
Payer: MEDICARE

## 2022-02-14 VITALS — WEIGHT: 130 LBS | BODY MASS INDEX: 22.2 KG/M2 | HEIGHT: 64 IN

## 2022-02-14 PROCEDURE — 99213 OFFICE O/P EST LOW 20 MIN: CPT

## 2022-02-14 RX ORDER — AMOXICILLIN 500 MG/1
500 CAPSULE ORAL
Qty: 21 | Refills: 0 | Status: DISCONTINUED | COMMUNITY
Start: 2021-07-29 | End: 2022-02-14

## 2022-02-14 NOTE — HISTORY OF PRESENT ILLNESS
[FreeTextEntry1] : Patient presents for skin examination. [de-identified] : Denies new, changing, bleeding or tender lesions on the skin over the past year.\par

## 2022-02-14 NOTE — PHYSICAL EXAM
[Alert] : alert [Oriented x 3] : ~L oriented x 3 [Well Nourished] : well nourished [Full Body Skin Exam Performed] : performed [FreeTextEntry3] : A full skin exam was performed including the scalp, face, neck, chest, abdomen, back, buttocks, upper extremities and lower extremities.  The patient declined examination of the breasts and genitalia.  \par The exam did show the following benign growths:\par Seborrheic keratoses.\par Lentigines.\par Cherry angioma.\par

## 2022-02-15 ENCOUNTER — NON-APPOINTMENT (OUTPATIENT)
Age: 82
End: 2022-02-15

## 2022-02-17 ENCOUNTER — NON-APPOINTMENT (OUTPATIENT)
Age: 82
End: 2022-02-17

## 2022-02-19 ENCOUNTER — OUTPATIENT (OUTPATIENT)
Dept: OUTPATIENT SERVICES | Facility: HOSPITAL | Age: 82
LOS: 1 days | End: 2022-02-19

## 2022-02-19 ENCOUNTER — APPOINTMENT (OUTPATIENT)
Age: 82
End: 2022-02-19

## 2022-02-19 VITALS
DIASTOLIC BLOOD PRESSURE: 80 MMHG | SYSTOLIC BLOOD PRESSURE: 149 MMHG | OXYGEN SATURATION: 97 % | RESPIRATION RATE: 20 BRPM | TEMPERATURE: 98 F | HEART RATE: 89 BPM

## 2022-02-19 VITALS — DIASTOLIC BLOOD PRESSURE: 98 MMHG | SYSTOLIC BLOOD PRESSURE: 161 MMHG | TEMPERATURE: 98 F | HEART RATE: 98 BPM

## 2022-02-19 DIAGNOSIS — M06.9 RHEUMATOID ARTHRITIS, UNSPECIFIED: ICD-10-CM

## 2022-02-22 DIAGNOSIS — Z23 ENCOUNTER FOR IMMUNIZATION: ICD-10-CM

## 2022-03-15 ENCOUNTER — APPOINTMENT (OUTPATIENT)
Dept: RHEUMATOLOGY | Facility: CLINIC | Age: 82
End: 2022-03-15
Payer: MEDICARE

## 2022-03-15 VITALS
WEIGHT: 130 LBS | DIASTOLIC BLOOD PRESSURE: 93 MMHG | HEART RATE: 88 BPM | OXYGEN SATURATION: 98 % | SYSTOLIC BLOOD PRESSURE: 149 MMHG | TEMPERATURE: 97.6 F | BODY MASS INDEX: 22.31 KG/M2

## 2022-03-15 PROCEDURE — 20610 DRAIN/INJ JOINT/BURSA W/O US: CPT | Mod: 50

## 2022-03-15 PROCEDURE — 96372 THER/PROPH/DIAG INJ SC/IM: CPT | Mod: 59

## 2022-03-15 RX ORDER — DENOSUMAB 60 MG/ML
60 INJECTION SUBCUTANEOUS
Qty: 60 | Refills: 0 | Status: COMPLETED | OUTPATIENT
Start: 2022-03-15

## 2022-03-15 RX ADMIN — DENOSUMAB 0 MG/ML: 60 INJECTION SUBCUTANEOUS at 00:00

## 2022-03-15 RX ADMIN — SODIUM HYALURONATE INTRA-ARTICULAR SOLN PREF SYR 25 MG/2.5ML 0 MG/2.5ML: 25/2.5 SOLUTION PREFILLED SYRINGE at 00:00

## 2022-03-17 RX ORDER — SODIUM HYALURONATE INTRA-ARTICULAR SOLN PREF SYR 25 MG/2.5ML 25/2.5 MG/ML
25 SOLUTION PREFILLED SYRINGE INTRAARTICULAR
Refills: 0 | Status: COMPLETED | OUTPATIENT
Start: 2022-03-17

## 2022-03-17 NOTE — PROCEDURE
[FreeTextEntry1] : The procedure risks was discussed with the patient.\par Indications: therapeutic purposes \par #1 site identified in the right knee . Verbal consent was obtained. \par Anesthesia was with ethyl chloride.\par The patient was prepped with betadine solution. \par A 21 gauge 1.5 inch needle was used.\par Injectables: 1mL of xylocaine 1% for local anesthetic and 25mg Supartz to right knee. \par The patient tolerated the procedure well. No fluid was aspirated. \par There were no complications. Handouts/patient instructions were given to patient . patient was instructed to call if redness at site, a decrease in range of motion or an increase in pain is noted after procedure. \par \par #2 site identified in the left knee . Verbal consent was obtained. \par Anesthesia was with ethyl chloride.\par The patient was prepped with betadine solution. \par A 21 gauge 1.5 inch needle was used.\par Injectables: 1mL of xylocaine 1% for local anesthetic. Injected 25mg Supartz to left knee. \par The patient tolerated the procedure well. No fluid was aspirated. \par There were no complications. Handouts/patient instructions were given to patient . patient was instructed to call if redness at site, a decrease in range of motion or an increase in pain is noted after procedure. \par \par  \par #3\par Skin was prepped with alcohol, and 60mg of Prolia was injected subQ. Patient tolerated injection well.\par \par

## 2022-03-17 NOTE — ASSESSMENT
[FreeTextEntry1] : f/u 1 week for 2nd set of supartz injections\par prolia administered today 3/15/22

## 2022-03-22 ENCOUNTER — APPOINTMENT (OUTPATIENT)
Dept: RHEUMATOLOGY | Facility: CLINIC | Age: 82
End: 2022-03-22
Payer: MEDICARE

## 2022-03-22 VITALS
WEIGHT: 130 LBS | SYSTOLIC BLOOD PRESSURE: 130 MMHG | OXYGEN SATURATION: 96 % | DIASTOLIC BLOOD PRESSURE: 80 MMHG | TEMPERATURE: 97.7 F | BODY MASS INDEX: 22.2 KG/M2 | HEART RATE: 92 BPM | HEIGHT: 64 IN

## 2022-03-22 PROCEDURE — 20610 DRAIN/INJ JOINT/BURSA W/O US: CPT | Mod: 50

## 2022-03-22 RX ORDER — SODIUM HYALURONATE INTRA-ARTICULAR SOLN PREF SYR 25 MG/2.5ML 25/2.5 MG/ML
25 SOLUTION PREFILLED SYRINGE INTRAARTICULAR
Refills: 0 | Status: COMPLETED | OUTPATIENT
Start: 2022-03-22

## 2022-03-22 RX ADMIN — SODIUM HYALURONATE INTRA-ARTICULAR SOLN PREF SYR 25 MG/2.5ML 0 MG/2.5ML: 25/2.5 SOLUTION PREFILLED SYRINGE at 00:00

## 2022-03-22 NOTE — PROCEDURE
[FreeTextEntry1] : The procedure risks was discussed with the patient.\par Indications: therapeutic purposes \par #1 site identified in the right knee . Verbal consent was obtained. \par Anesthesia was with ethyl chloride.\par The patient was prepped with betadine solution. \par A 21 gauge 1.5 inch needle was used.\par Injectables: 1mL of xylocaine 1% for local anesthetic and 25mg Supartz to right knee. \par The patient tolerated the procedure well. No fluid was aspirated. \par There were no complications. Handouts/patient instructions were given to patient . patient was instructed to call if redness at site, a decrease in range of motion or an increase in pain is noted after procedure. \par \par #2 site identified in the left knee . Verbal consent was obtained. \par Anesthesia was with ethyl chloride.\par The patient was prepped with betadine solution. \par A 21 gauge 1.5 inch needle was used.\par Injectables: 1mL of xylocaine 1% for local anesthetic. Injected 25mg Supartz to left knee. \par The patient tolerated the procedure well. No fluid was aspirated. \par There were no complications. Handouts/patient instructions were given to patient . patient was instructed to call if redness at site, a decrease in range of motion or an increase in pain is noted after procedure. \par \par

## 2022-03-26 ENCOUNTER — APPOINTMENT (OUTPATIENT)
Age: 82
End: 2022-03-26

## 2022-03-26 ENCOUNTER — OUTPATIENT (OUTPATIENT)
Dept: OUTPATIENT SERVICES | Facility: HOSPITAL | Age: 82
LOS: 1 days | End: 2022-03-26

## 2022-03-26 VITALS
TEMPERATURE: 98 F | SYSTOLIC BLOOD PRESSURE: 154 MMHG | OXYGEN SATURATION: 97 % | RESPIRATION RATE: 18 BRPM | HEART RATE: 94 BPM | DIASTOLIC BLOOD PRESSURE: 96 MMHG

## 2022-03-26 VITALS
SYSTOLIC BLOOD PRESSURE: 143 MMHG | TEMPERATURE: 98 F | OXYGEN SATURATION: 94 % | HEART RATE: 85 BPM | RESPIRATION RATE: 18 BRPM | DIASTOLIC BLOOD PRESSURE: 86 MMHG

## 2022-03-26 DIAGNOSIS — M06.09 RHEUMATOID ARTHRITIS WITHOUT RHEUMATOID FACTOR, MULTIPLE SITES: ICD-10-CM

## 2022-03-26 DIAGNOSIS — Z23 ENCOUNTER FOR IMMUNIZATION: ICD-10-CM

## 2022-03-29 ENCOUNTER — APPOINTMENT (OUTPATIENT)
Dept: RHEUMATOLOGY | Facility: CLINIC | Age: 82
End: 2022-03-29
Payer: MEDICARE

## 2022-03-29 VITALS
TEMPERATURE: 97.6 F | BODY MASS INDEX: 22.31 KG/M2 | SYSTOLIC BLOOD PRESSURE: 118 MMHG | DIASTOLIC BLOOD PRESSURE: 68 MMHG | WEIGHT: 130 LBS | HEART RATE: 87 BPM | OXYGEN SATURATION: 97 %

## 2022-03-29 PROCEDURE — 20610 DRAIN/INJ JOINT/BURSA W/O US: CPT | Mod: 50

## 2022-03-29 RX ORDER — SODIUM HYALURONATE INTRA-ARTICULAR SOLN PREF SYR 25 MG/2.5ML 25/2.5 MG/ML
25 SOLUTION PREFILLED SYRINGE INTRAARTICULAR
Refills: 0 | Status: COMPLETED | OUTPATIENT
Start: 2022-03-29

## 2022-03-29 RX ADMIN — SODIUM HYALURONATE INTRA-ARTICULAR SOLN PREF SYR 25 MG/2.5ML 0 MG/2.5ML: 25/2.5 SOLUTION PREFILLED SYRINGE at 00:00

## 2022-04-05 ENCOUNTER — APPOINTMENT (OUTPATIENT)
Dept: RHEUMATOLOGY | Facility: CLINIC | Age: 82
End: 2022-04-05
Payer: MEDICARE

## 2022-04-05 VITALS
OXYGEN SATURATION: 98 % | DIASTOLIC BLOOD PRESSURE: 89 MMHG | HEIGHT: 64 IN | SYSTOLIC BLOOD PRESSURE: 169 MMHG | HEART RATE: 88 BPM | TEMPERATURE: 97.8 F | WEIGHT: 130 LBS | BODY MASS INDEX: 22.2 KG/M2

## 2022-04-05 PROCEDURE — 20610 DRAIN/INJ JOINT/BURSA W/O US: CPT | Mod: 50

## 2022-04-05 RX ORDER — SODIUM HYALURONATE INTRA-ARTICULAR SOLN PREF SYR 25 MG/2.5ML 25/2.5 MG/ML
25 SOLUTION PREFILLED SYRINGE INTRAARTICULAR
Refills: 0 | Status: COMPLETED | OUTPATIENT
Start: 2022-04-05

## 2022-04-05 RX ADMIN — SODIUM HYALURONATE INTRA-ARTICULAR SOLN PREF SYR 25 MG/2.5ML 0 MG/2.5ML: 25/2.5 SOLUTION PREFILLED SYRINGE at 00:00

## 2022-04-12 ENCOUNTER — APPOINTMENT (OUTPATIENT)
Dept: RHEUMATOLOGY | Facility: CLINIC | Age: 82
End: 2022-04-12
Payer: MEDICARE

## 2022-04-12 VITALS
HEART RATE: 81 BPM | TEMPERATURE: 97.5 F | DIASTOLIC BLOOD PRESSURE: 80 MMHG | SYSTOLIC BLOOD PRESSURE: 130 MMHG | HEIGHT: 64 IN | BODY MASS INDEX: 22.2 KG/M2 | OXYGEN SATURATION: 96 % | WEIGHT: 130 LBS

## 2022-04-12 PROCEDURE — 20610 DRAIN/INJ JOINT/BURSA W/O US: CPT | Mod: 50

## 2022-04-12 RX ORDER — RITUXIMAB 10 MG/ML
500 INJECTION, SOLUTION INTRAVENOUS
Qty: 2 | Refills: 1 | Status: DISCONTINUED | COMMUNITY
Start: 2019-09-13 | End: 2022-04-12

## 2022-04-12 RX ORDER — SODIUM HYALURONATE INTRA-ARTICULAR SOLN PREF SYR 25 MG/2.5ML 25/2.5 MG/ML
25 SOLUTION PREFILLED SYRINGE INTRAARTICULAR
Refills: 0 | Status: COMPLETED | OUTPATIENT
Start: 2022-04-12

## 2022-04-12 RX ORDER — SODIUM HYALURONATE INTRA-ARTICULAR SOLN PREF SYR 25 MG/2.5ML 25/2.5 MG/ML
25 SOLUTION PREFILLED SYRINGE INTRAARTICULAR
Qty: 3 | Refills: 0 | Status: DISCONTINUED | COMMUNITY
Start: 2019-08-09 | End: 2021-11-08

## 2022-04-12 RX ORDER — AZITHROMYCIN 250 MG/1
250 TABLET, FILM COATED ORAL
Qty: 1 | Refills: 0 | Status: DISCONTINUED | COMMUNITY
Start: 2021-11-08 | End: 2022-04-12

## 2022-04-12 RX ADMIN — SODIUM HYALURONATE INTRA-ARTICULAR SOLN PREF SYR 25 MG/2.5ML 0 MG/2.5ML: 25/2.5 SOLUTION PREFILLED SYRINGE at 00:00

## 2022-04-12 NOTE — PROCEDURE
[FreeTextEntry1] : The procedure risks was discussed with the patient.\par Indications: therapeutic purposes \par #1 site identified in the right knee . Verbal consent was obtained. \par Anesthesia was with ethyl chloride.\par The patient was prepped with betadine solution. \par A 21 gauge 1.5 inch needle was used.\par Injectables: 1mL of xylocaine 1% for local anesthetic and 25mg Supartz to right knee. \par The patient tolerated the procedure well. No fluid was aspirated. \par There were no complications. Handouts/patient instructions were given to patient . patient was instructed to call if redness at site, a decrease in range of motion or an increase in pain is noted after procedure. \par \par #2 site identified in the left knee . Verbal consent was obtained. \par Anesthesia was with ethyl chloride.\par The patient was prepped with betadine solution. \par A 21 gauge 1.5 inch needle was used.\par Injectables: 1mL of xylocaine 1% for local anesthetic. Injected 25mg Supartz to left knee. \par The patient tolerated the procedure well. No fluid was aspirated. \par There were no complications. Handouts/patient instructions were given to patient . patient was instructed to call if redness at site, a decrease in range of motion or an increase in pain is noted after procedure. \par \par \par

## 2022-06-14 LAB — SARS-COV-2 N GENE NPH QL NAA+PROBE: NOT DETECTED

## 2022-06-20 ENCOUNTER — APPOINTMENT (OUTPATIENT)
Dept: INTERNAL MEDICINE | Facility: CLINIC | Age: 82
End: 2022-06-20
Payer: MEDICARE

## 2022-06-20 VITALS
DIASTOLIC BLOOD PRESSURE: 84 MMHG | HEART RATE: 97 BPM | TEMPERATURE: 97.8 F | SYSTOLIC BLOOD PRESSURE: 134 MMHG | RESPIRATION RATE: 18 BRPM | WEIGHT: 100 LBS | OXYGEN SATURATION: 98 % | BODY MASS INDEX: 19.63 KG/M2 | HEIGHT: 60 IN

## 2022-06-20 PROCEDURE — ZZZZZ: CPT

## 2022-06-22 ENCOUNTER — APPOINTMENT (OUTPATIENT)
Dept: INTERNAL MEDICINE | Facility: CLINIC | Age: 82
End: 2022-06-22
Payer: MEDICARE

## 2022-06-22 VITALS
RESPIRATION RATE: 16 BRPM | HEART RATE: 95 BPM | OXYGEN SATURATION: 97 % | BODY MASS INDEX: 19.63 KG/M2 | HEIGHT: 60 IN | TEMPERATURE: 98.1 F | WEIGHT: 100 LBS | DIASTOLIC BLOOD PRESSURE: 90 MMHG | SYSTOLIC BLOOD PRESSURE: 144 MMHG

## 2022-06-22 DIAGNOSIS — R91.8 OTHER NONSPECIFIC ABNORMAL FINDING OF LUNG FIELD: ICD-10-CM

## 2022-06-22 DIAGNOSIS — R63.4 ABNORMAL WEIGHT LOSS: ICD-10-CM

## 2022-06-22 DIAGNOSIS — J47.9 BRONCHIECTASIS, UNCOMPLICATED: ICD-10-CM

## 2022-06-22 DIAGNOSIS — Z79.899 OTHER LONG TERM (CURRENT) DRUG THERAPY: ICD-10-CM

## 2022-06-22 DIAGNOSIS — E78.00 PURE HYPERCHOLESTEROLEMIA, UNSPECIFIED: ICD-10-CM

## 2022-06-22 DIAGNOSIS — J44.9 CHRONIC OBSTRUCTIVE PULMONARY DISEASE, UNSPECIFIED: ICD-10-CM

## 2022-06-22 DIAGNOSIS — Z87.891 PERSONAL HISTORY OF NICOTINE DEPENDENCE: ICD-10-CM

## 2022-06-22 LAB — SARS-COV-2 N GENE NPH QL NAA+PROBE: NOT DETECTED

## 2022-06-22 PROCEDURE — 94060 EVALUATION OF WHEEZING: CPT

## 2022-06-22 PROCEDURE — 99214 OFFICE O/P EST MOD 30 MIN: CPT | Mod: 25

## 2022-06-22 PROCEDURE — G0009: CPT

## 2022-06-22 PROCEDURE — 94727 GAS DIL/WSHOT DETER LNG VOL: CPT

## 2022-06-22 PROCEDURE — 94729 DIFFUSING CAPACITY: CPT

## 2022-06-22 PROCEDURE — 90677 PCV20 VACCINE IM: CPT

## 2022-06-22 NOTE — HISTORY OF PRESENT ILLNESS
[FreeTextEntry1] : Followup evaluation [de-identified] : Mrs. Grande presents for a followup evaluation. She is an 81-year-old female with a history of seronegative rheumatoid arthritis, mixed connective tissue disease, COPD with asthma, mild sleep apnea, osteoporosis bronchiectasis. Patient is complaining of unintentional weight loss. She has lost 30 pounds since 4/12/22. Patient states she is eating. There is no change in bowel habits. She does have some shortness of breath with exertion, however, symptoms resolve with rest. She has no nocturnal symptoms of cough or dyspnea. There is no hematuria or dysuria.\par Patient also continues on Plaquenil 200 mg b.i.d. She has been off of Rituxin therapy for almost one year. She does have symptoms of joint pain.

## 2022-06-22 NOTE — PHYSICAL EXAM
[No Acute Distress] : no acute distress [Well Nourished] : well nourished [Well Developed] : well developed [Well-Appearing] : well-appearing [Normal Sclera/Conjunctiva] : normal sclera/conjunctiva [PERRL] : pupils equal round and reactive to light [EOMI] : extraocular movements intact [Normal Outer Ear/Nose] : the outer ears and nose were normal in appearance [Normal Oropharynx] : the oropharynx was normal [No JVD] : no jugular venous distention [No Lymphadenopathy] : no lymphadenopathy [Supple] : supple [Thyroid Normal, No Nodules] : the thyroid was normal and there were no nodules present [No Respiratory Distress] : no respiratory distress  [No Accessory Muscle Use] : no accessory muscle use [Normal Rate] : normal rate  [Regular Rhythm] : with a regular rhythm [Normal S1, S2] : normal S1 and S2 [No Murmur] : no murmur heard [No Carotid Bruits] : no carotid bruits [No Abdominal Bruit] : a ~M bruit was not heard ~T in the abdomen [No Varicosities] : no varicosities [Pedal Pulses Present] : the pedal pulses are present [No Edema] : there was no peripheral edema [No Palpable Aorta] : no palpable aorta [No Extremity Clubbing/Cyanosis] : no extremity clubbing/cyanosis [Soft] : abdomen soft [Non Tender] : non-tender [Non-distended] : non-distended [No Masses] : no abdominal mass palpated [No HSM] : no HSM [Normal Bowel Sounds] : normal bowel sounds [Normal Posterior Cervical Nodes] : no posterior cervical lymphadenopathy [Normal Anterior Cervical Nodes] : no anterior cervical lymphadenopathy [No CVA Tenderness] : no CVA  tenderness [No Spinal Tenderness] : no spinal tenderness [Scoliosis] : scoliosis [No Joint Swelling] : no joint swelling [Grossly Normal Strength/Tone] : grossly normal strength/tone [No Rash] : no rash [Coordination Grossly Intact] : coordination grossly intact [No Focal Deficits] : no focal deficits [Normal Gait] : normal gait [Deep Tendon Reflexes (DTR)] : deep tendon reflexes were 2+ and symmetric [Normal Affect] : the affect was normal [Normal Insight/Judgement] : insight and judgment were intact [de-identified] : Following crackles bases

## 2022-06-22 NOTE — PLAN
[FreeTextEntry1] : Ms. Grande presents for followup evaluation.\par \par #1. Patient has unexplained weight loss in the past several months. She will be sent for comprehensive blood profile which will include CBC, CMP plus hemoglobin A1c, iron levels, lipid profile, thyroid function tests and urinalysis.\par \par #2. Patient will receive the Prevnar 20 vaccine or pneumonia.\par \par #3. Patient will continue on current medication regimen which has been reviewed and were revised.\par \par #4. Patient had demonstrates mild to moderate COPD pulmonary function tests. She will be started on Symbicort 2 puffs b.i.d. She is having some shortness of breath with exertion. So improvement is noted and patient will have a followup evaluation.\par \par #5. Followup in 6 months.

## 2022-06-22 NOTE — DATA REVIEWED
[FreeTextEntry1] : Complete pulmonary function tests show moderate obstructive lung disease. FEV1 is 1.38 L which is 83% predicted. FVC is 2.28 L which is 104% predicted. FEV1/FVC ratio is 61%. FEF 25/75% is 0.56 L percent which is 40% predicted. Lung\par Lungs show residual volume of 2.47 L which is 121% predicted indicating air trapping. Diffusing capacity is impaired at 63%.

## 2022-06-23 ENCOUNTER — OUTPATIENT (OUTPATIENT)
Dept: OUTPATIENT SERVICES | Facility: HOSPITAL | Age: 82
LOS: 1 days | End: 2022-06-23
Payer: MEDICARE

## 2022-06-23 DIAGNOSIS — M54.16 RADICULOPATHY, LUMBAR REGION: ICD-10-CM

## 2022-06-23 PROCEDURE — 62323 NJX INTERLAMINAR LMBR/SAC: CPT

## 2022-06-24 ENCOUNTER — LABORATORY RESULT (OUTPATIENT)
Age: 82
End: 2022-06-24

## 2022-06-24 ENCOUNTER — NON-APPOINTMENT (OUTPATIENT)
Age: 82
End: 2022-06-24

## 2022-06-24 LAB
BASOPHILS # BLD AUTO: 0.09 K/UL
BASOPHILS NFR BLD AUTO: 0.9 %
EOSINOPHIL # BLD AUTO: 0.16 K/UL
EOSINOPHIL NFR BLD AUTO: 1.6 %
HCT VFR BLD CALC: 43.1 %
HGB BLD-MCNC: 13.5 G/DL
IMM GRANULOCYTES NFR BLD AUTO: 0.2 %
LYMPHOCYTES # BLD AUTO: 1.47 K/UL
LYMPHOCYTES NFR BLD AUTO: 14.3 %
MAN DIFF?: NORMAL
MCHC RBC-ENTMCNC: 30.5 PG
MCHC RBC-ENTMCNC: 31.3 GM/DL
MCV RBC AUTO: 97.3 FL
MONOCYTES # BLD AUTO: 0.64 K/UL
MONOCYTES NFR BLD AUTO: 6.2 %
NEUTROPHILS # BLD AUTO: 7.89 K/UL
NEUTROPHILS NFR BLD AUTO: 76.8 %
PLATELET # BLD AUTO: 276 K/UL
RBC # BLD: 4.43 M/UL
RBC # FLD: 13.5 %
WBC # FLD AUTO: 10.27 K/UL

## 2022-06-27 LAB
25(OH)D3 SERPL-MCNC: 58.1 NG/ML
ALBUMIN SERPL ELPH-MCNC: 4.6 G/DL
ALP BLD-CCNC: 83 U/L
ALT SERPL-CCNC: 15 U/L
ANION GAP SERPL CALC-SCNC: 11 MMOL/L
APPEARANCE: CLEAR
AST SERPL-CCNC: 22 U/L
BACTERIA: NEGATIVE
BILIRUB SERPL-MCNC: 0.4 MG/DL
BILIRUBIN URINE: NEGATIVE
BLOOD URINE: NEGATIVE
BUN SERPL-MCNC: 24 MG/DL
CALCIUM SERPL-MCNC: 9.1 MG/DL
CHLORIDE SERPL-SCNC: 103 MMOL/L
CHOLEST SERPL-MCNC: 209 MG/DL
CO2 SERPL-SCNC: 26 MMOL/L
COLOR: NORMAL
CREAT SERPL-MCNC: 0.85 MG/DL
EGFR: 69 ML/MIN/1.73M2
FERRITIN SERPL-MCNC: 82 NG/ML
FOLATE SERPL-MCNC: 13.3 NG/ML
GLUCOSE QUALITATIVE U: NEGATIVE
GLUCOSE SERPL-MCNC: 83 MG/DL
HDLC SERPL-MCNC: 80 MG/DL
HYALINE CASTS: 1 /LPF
IRON SATN MFR SERPL: 21 %
IRON SERPL-MCNC: 65 UG/DL
KETONES URINE: NEGATIVE
LDLC SERPL CALC-MCNC: 118 MG/DL
LEUKOCYTE ESTERASE URINE: NEGATIVE
MICROSCOPIC-UA: NORMAL
NITRITE URINE: NEGATIVE
NONHDLC SERPL-MCNC: 129 MG/DL
PH URINE: 6.5
POTASSIUM SERPL-SCNC: 5.5 MMOL/L
PROT SERPL-MCNC: 6.9 G/DL
PROTEIN URINE: NORMAL
RED BLOOD CELLS URINE: 1 /HPF
SODIUM SERPL-SCNC: 140 MMOL/L
SPECIFIC GRAVITY URINE: 1.02
SQUAMOUS EPITHELIAL CELLS: 1 /HPF
T3 SERPL-MCNC: 90 NG/DL
T3FREE SERPL-MCNC: 2.41 PG/ML
T3RU NFR SERPL: 1 TBI
T4 FREE SERPL-MCNC: 1.2 NG/DL
T4 SERPL-MCNC: 7.4 UG/DL
TIBC SERPL-MCNC: 313 UG/DL
TRANSFERRIN SERPL-MCNC: 258 MG/DL
TRIGL SERPL-MCNC: 54 MG/DL
TSH SERPL-ACNC: 2.22 UIU/ML
UIBC SERPL-MCNC: 248 UG/DL
UROBILINOGEN URINE: NORMAL
VIT B12 SERPL-MCNC: 654 PG/ML
WHITE BLOOD CELLS URINE: 1 /HPF

## 2022-06-28 ENCOUNTER — NON-APPOINTMENT (OUTPATIENT)
Age: 82
End: 2022-06-28

## 2022-06-28 DIAGNOSIS — E87.5 HYPERKALEMIA: ICD-10-CM

## 2022-07-05 ENCOUNTER — NON-APPOINTMENT (OUTPATIENT)
Age: 82
End: 2022-07-05

## 2022-07-05 ENCOUNTER — RESULT REVIEW (OUTPATIENT)
Age: 82
End: 2022-07-05

## 2022-07-21 ENCOUNTER — APPOINTMENT (OUTPATIENT)
Dept: CT IMAGING | Facility: CLINIC | Age: 82
End: 2022-07-21

## 2022-07-21 ENCOUNTER — OUTPATIENT (OUTPATIENT)
Dept: OUTPATIENT SERVICES | Facility: HOSPITAL | Age: 82
LOS: 1 days | End: 2022-07-21
Payer: MEDICARE

## 2022-07-21 DIAGNOSIS — R63.4 ABNORMAL WEIGHT LOSS: ICD-10-CM

## 2022-07-21 PROCEDURE — 71250 CT THORAX DX C-: CPT | Mod: 26,MH

## 2022-07-21 PROCEDURE — 74177 CT ABD & PELVIS W/CONTRAST: CPT

## 2022-07-21 PROCEDURE — 74177 CT ABD & PELVIS W/CONTRAST: CPT | Mod: 26,MH

## 2022-07-21 PROCEDURE — 71250 CT THORAX DX C-: CPT

## 2022-07-28 ENCOUNTER — NON-APPOINTMENT (OUTPATIENT)
Age: 82
End: 2022-07-28

## 2022-07-29 ENCOUNTER — LABORATORY RESULT (OUTPATIENT)
Age: 82
End: 2022-07-29

## 2022-08-01 LAB
25(OH)D3 SERPL-MCNC: 54.1 NG/ML
ALBUMIN SERPL ELPH-MCNC: 4.3 G/DL
ALP BLD-CCNC: 69 U/L
ALT SERPL-CCNC: 16 U/L
ANION GAP SERPL CALC-SCNC: 9 MMOL/L
AST SERPL-CCNC: 20 U/L
BASOPHILS # BLD AUTO: 0.11 K/UL
BASOPHILS NFR BLD AUTO: 1.2 %
BILIRUB SERPL-MCNC: 0.4 MG/DL
BUN SERPL-MCNC: 35 MG/DL
CALCIUM SERPL-MCNC: 9.7 MG/DL
CHLORIDE SERPL-SCNC: 106 MMOL/L
CO2 SERPL-SCNC: 26 MMOL/L
CREAT SERPL-MCNC: 1.03 MG/DL
CRP SERPL-MCNC: <3 MG/L
EGFR: 55 ML/MIN/1.73M2
EOSINOPHIL # BLD AUTO: 0.29 K/UL
EOSINOPHIL NFR BLD AUTO: 3.1 %
ERYTHROCYTE [SEDIMENTATION RATE] IN BLOOD BY WESTERGREN METHOD: 13 MM/HR
GLUCOSE SERPL-MCNC: 95 MG/DL
HCT VFR BLD CALC: 42.8 %
HGB BLD-MCNC: 13.4 G/DL
IMM GRANULOCYTES NFR BLD AUTO: 0.3 %
LYMPHOCYTES # BLD AUTO: 1.72 K/UL
LYMPHOCYTES NFR BLD AUTO: 18.4 %
MAN DIFF?: NORMAL
MCHC RBC-ENTMCNC: 30.7 PG
MCHC RBC-ENTMCNC: 31.3 GM/DL
MCV RBC AUTO: 98.2 FL
MONOCYTES # BLD AUTO: 0.62 K/UL
MONOCYTES NFR BLD AUTO: 6.6 %
NEUTROPHILS # BLD AUTO: 6.56 K/UL
NEUTROPHILS NFR BLD AUTO: 70.4 %
PLATELET # BLD AUTO: 278 K/UL
POTASSIUM SERPL-SCNC: 4.9 MMOL/L
PROT SERPL-MCNC: 6.7 G/DL
RBC # BLD: 4.36 M/UL
RBC # FLD: 13.7 %
SODIUM SERPL-SCNC: 141 MMOL/L
WBC # FLD AUTO: 9.33 K/UL

## 2022-08-02 ENCOUNTER — APPOINTMENT (OUTPATIENT)
Dept: RHEUMATOLOGY | Facility: CLINIC | Age: 82
End: 2022-08-02

## 2022-08-02 VITALS
HEIGHT: 60 IN | DIASTOLIC BLOOD PRESSURE: 80 MMHG | TEMPERATURE: 96.7 F | BODY MASS INDEX: 19.63 KG/M2 | SYSTOLIC BLOOD PRESSURE: 120 MMHG | HEART RATE: 86 BPM | OXYGEN SATURATION: 97 % | WEIGHT: 100 LBS

## 2022-08-02 PROCEDURE — 96372 THER/PROPH/DIAG INJ SC/IM: CPT

## 2022-08-02 PROCEDURE — 99214 OFFICE O/P EST MOD 30 MIN: CPT | Mod: 25

## 2022-08-02 RX ORDER — DEXTROAMPHETAMINE SACCHARATE, AMPHETAMINE ASPARTATE, DEXTROAMPHETAMINE SULFATE AND AMPHETAMINE SULFATE 3.75; 3.75; 3.75; 3.75 MG/1; MG/1; MG/1; MG/1
15 TABLET ORAL DAILY
Refills: 0 | Status: DISCONTINUED | COMMUNITY
Start: 2016-11-15 | End: 2022-08-02

## 2022-08-02 RX ORDER — DEXTROAMPHETAMINE SACCHARATE, AMPHETAMINE ASPARTATE MONOHYDRATE, DEXTROAMPHETAMINE SULFATE AND AMPHETAMINE SULFATE 7.5; 7.5; 7.5; 7.5 MG/1; MG/1; MG/1; MG/1
30 CAPSULE, EXTENDED RELEASE ORAL
Refills: 0 | Status: DISCONTINUED | COMMUNITY
End: 2022-08-02

## 2022-08-02 RX ORDER — SACUBITRIL AND VALSARTAN 49; 51 MG/1; MG/1
TABLET, FILM COATED ORAL
Refills: 0 | Status: ACTIVE | COMMUNITY

## 2022-08-02 RX ORDER — BUDESONIDE AND FORMOTEROL FUMARATE DIHYDRATE 160; 4.5 UG/1; UG/1
160-4.5 AEROSOL RESPIRATORY (INHALATION) TWICE DAILY
Qty: 1 | Refills: 5 | Status: DISCONTINUED | COMMUNITY
Start: 2022-06-22 | End: 2022-08-02

## 2022-08-02 RX ORDER — METHYLPRED ACET/NACL,ISO-OS/PF 80 MG/ML
80 VIAL (ML) INJECTION
Qty: 1 | Refills: 0 | Status: COMPLETED | OUTPATIENT
Start: 2022-08-02

## 2022-08-02 RX ADMIN — METHYLPREDNISOLONE ACETATE 0 MG/ML: 80 INJECTION, SUSPENSION INTRA-ARTICULAR; INTRALESIONAL; INTRAMUSCULAR; SOFT TISSUE at 00:00

## 2022-08-02 NOTE — HISTORY OF PRESENT ILLNESS
[FreeTextEntry1] : 80 y/o woman with hx of GERD, CHF, COPD/asthmatic component, borderline elevated HTN, HLD on crestor, degenerative disc disease of spine, and on adderall for paralytic sleep and attention deficit, here for f/u of her inflammatory arthritis. I am treating her for seronegative RA/with +RNP/-GABBY. She is on plaquenil 200mg BID and Rituxan. Rituxan started 4/2019. \par \par Back injection was helpful. \par Supartz was also helpful, at least 50% improvement.  She would be do again 10/11/2022.  \par \par Last prolia was 3/15/22, next due 09/15/22\par \par She had been happy with the PLQ/Rituxan combination. Last Rituxan was 6/29/21\par \par Current pain  with 15 minutes of AM stiffness. \par Had her last eye exam 12/2021\par \par She saw Dr. Daily. Was told not to take pantoprazole. States it didn't help her anyway. Was told to take pepto Bismol. \par \par She had CT chest 11/05/20 which shows bronchiectasis, few ill-defined opacities in peripheral aspect, scattered tree in bud opacities within lungs. \par \par Cancer screening:\par CT chest in 10/30/17: b/L stable nodules 11/11/2020: bronchiectasis, ill-defined opacities\par  mammogram was 12/2019: negative. \par EGD 1/19/18: Erosive gastritis\par Colonoscopy 1/19/18: normal. \par \par

## 2022-08-02 NOTE — ASSESSMENT
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 She is currently off prednisone. She is no longer on Rituxan due to covid climate, and wants to continue to remain off of it.   Her RA is relatively stable. \par Her OA is improved. \par \par Plan:\par RA/+RNP: overall stable. \par -continue plaquenil 200mg BID for seronegative RA/+RNP. G6PD is WNL. She goes to her eye doctor for monitoring q6 months.Had her last eye exam 12/2021.  Due again now.  \par -Off MTX and folic acid due to intolerance. \par -IM depomedrol given today 80mg for ankle pains and right 1st MCP swelling\par -Did well on Rituxan. Last dose 6/29/21. Wants to delay the next one as long as she can due to covid. She previously received Evusheld.  \par \par Pulmonary infiltrates\par -REcent CT chest ordered due to weight loss shows peripheral infiltrates.  She had discussed possible bronchoscopy, and possible tx fo MAC.  \par -She has hx of smoking, distant.\par -routine screening with PFTs and ECHO.\par -prev declined amlodipine for raynaud's. To continue to use conservative measures for hand warming. \par -f/u Dr. Walls and Dr. Caputo\par \par Right shoulder limited ROM-\par -prevent adhesive capsulitis\par \par Left shoulder pain/impingement- not active. \par -Injected right SAB 3/6/20 witih 80mg depomedrol. \par -80mg depomedrol administered to left SAB 12/4/18- didn't help too much\par \par \par Knee OA- doing well today\par -Injected left knee 3/6/20 with 80mg depomedrol after aspirating 8 cc of fluid from left knee. \par -HA injections eligible again 10/11/22\par -PT program for back\par \par Vit D deficiency/clinical osteoporosis\par -Completed 50,000IU q week x 4 weeks. currently back on 2000IU daily. \par -Vit D is WNL.\par -Check Vit D with next labs\par -DEXA shows osteopenia. With her tibia fracture, she has diagnosis of clinical osteoporosis. \par -prolia due 09/15\par \par labs reviewed,\par Next routine RPA 3-4 months\par  \par \par \par . \par

## 2022-08-02 NOTE — PROCEDURE
[FreeTextEntry1] : Skin was prepped with alcohol, and 80mg of depomedrol was injected intramuscularly left deltoid.  Patient tolerated injection well.\par

## 2022-08-05 ENCOUNTER — NON-APPOINTMENT (OUTPATIENT)
Age: 82
End: 2022-08-05

## 2022-09-15 ENCOUNTER — APPOINTMENT (OUTPATIENT)
Dept: GASTROENTEROLOGY | Facility: CLINIC | Age: 82
End: 2022-09-15

## 2022-09-21 ENCOUNTER — APPOINTMENT (OUTPATIENT)
Dept: RHEUMATOLOGY | Facility: CLINIC | Age: 82
End: 2022-09-21

## 2022-09-27 ENCOUNTER — APPOINTMENT (OUTPATIENT)
Dept: RHEUMATOLOGY | Facility: CLINIC | Age: 82
End: 2022-09-27

## 2022-09-27 VITALS
HEART RATE: 87 BPM | TEMPERATURE: 97.6 F | DIASTOLIC BLOOD PRESSURE: 76 MMHG | OXYGEN SATURATION: 97 % | BODY MASS INDEX: 18.16 KG/M2 | SYSTOLIC BLOOD PRESSURE: 128 MMHG | WEIGHT: 93 LBS

## 2022-09-27 PROCEDURE — 96372 THER/PROPH/DIAG INJ SC/IM: CPT

## 2022-09-27 RX ORDER — DENOSUMAB 60 MG/ML
60 INJECTION SUBCUTANEOUS
Qty: 60 | Refills: 0 | Status: COMPLETED | OUTPATIENT
Start: 2022-09-27

## 2022-09-27 RX ADMIN — DENOSUMAB 0 MG/ML: 60 INJECTION SUBCUTANEOUS at 00:00

## 2022-09-27 NOTE — PROCEDURE
[FreeTextEntry1] : Skin was prepped with alcohol, and 60mg of Prolia was injected subQ to left arm. Patient tolerated injection well.

## 2022-10-13 ENCOUNTER — APPOINTMENT (OUTPATIENT)
Dept: RHEUMATOLOGY | Facility: CLINIC | Age: 82
End: 2022-10-13

## 2022-10-13 VITALS
HEART RATE: 108 BPM | WEIGHT: 94.5 LBS | DIASTOLIC BLOOD PRESSURE: 78 MMHG | TEMPERATURE: 97.9 F | BODY MASS INDEX: 18.46 KG/M2 | OXYGEN SATURATION: 96 % | SYSTOLIC BLOOD PRESSURE: 128 MMHG

## 2022-10-13 PROCEDURE — 20610 DRAIN/INJ JOINT/BURSA W/O US: CPT | Mod: 50

## 2022-10-13 RX ORDER — SODIUM HYALURONATE INTRA-ARTICULAR SOLN PREF SYR 25 MG/2.5ML 25/2.5 MG/ML
25 SOLUTION PREFILLED SYRINGE INTRAARTICULAR
Refills: 0 | Status: COMPLETED | OUTPATIENT
Start: 2022-10-13

## 2022-10-13 RX ADMIN — SODIUM HYALURONATE INTRA-ARTICULAR SOLN PREF SYR 25 MG/2.5ML 0 MG/2.5ML: 25/2.5 SOLUTION PREFILLED SYRINGE at 00:00

## 2022-10-15 ENCOUNTER — OUTPATIENT (OUTPATIENT)
Dept: OUTPATIENT SERVICES | Facility: HOSPITAL | Age: 82
LOS: 1 days | End: 2022-10-15

## 2022-10-15 ENCOUNTER — APPOINTMENT (OUTPATIENT)
Age: 82
End: 2022-10-15

## 2022-10-15 VITALS
DIASTOLIC BLOOD PRESSURE: 70 MMHG | OXYGEN SATURATION: 94 % | RESPIRATION RATE: 16 BRPM | HEART RATE: 86 BPM | TEMPERATURE: 98 F | SYSTOLIC BLOOD PRESSURE: 107 MMHG

## 2022-10-15 VITALS
HEART RATE: 103 BPM | OXYGEN SATURATION: 95 % | DIASTOLIC BLOOD PRESSURE: 80 MMHG | RESPIRATION RATE: 16 BRPM | SYSTOLIC BLOOD PRESSURE: 131 MMHG | TEMPERATURE: 98 F

## 2022-10-15 DIAGNOSIS — M06.09 RHEUMATOID ARTHRITIS WITHOUT RHEUMATOID FACTOR, MULTIPLE SITES: ICD-10-CM

## 2022-10-15 DIAGNOSIS — Z23 ENCOUNTER FOR IMMUNIZATION: ICD-10-CM

## 2022-10-20 ENCOUNTER — APPOINTMENT (OUTPATIENT)
Dept: RHEUMATOLOGY | Facility: CLINIC | Age: 82
End: 2022-10-20

## 2022-10-20 VITALS
SYSTOLIC BLOOD PRESSURE: 130 MMHG | DIASTOLIC BLOOD PRESSURE: 60 MMHG | HEART RATE: 83 BPM | HEIGHT: 60 IN | TEMPERATURE: 97 F | BODY MASS INDEX: 18.46 KG/M2 | OXYGEN SATURATION: 96 % | WEIGHT: 94 LBS

## 2022-10-20 PROCEDURE — 20610 DRAIN/INJ JOINT/BURSA W/O US: CPT | Mod: 50

## 2022-10-20 RX ORDER — SODIUM HYALURONATE INTRA-ARTICULAR SOLN PREF SYR 25 MG/2.5ML 25/2.5 MG/ML
25 SOLUTION PREFILLED SYRINGE INTRAARTICULAR
Refills: 0 | Status: COMPLETED | OUTPATIENT
Start: 2022-10-20

## 2022-10-20 RX ADMIN — SODIUM HYALURONATE INTRA-ARTICULAR SOLN PREF SYR 25 MG/2.5ML 0 MG/2.5ML: 25/2.5 SOLUTION PREFILLED SYRINGE at 00:00

## 2022-10-27 ENCOUNTER — APPOINTMENT (OUTPATIENT)
Dept: RHEUMATOLOGY | Facility: CLINIC | Age: 82
End: 2022-10-27

## 2022-10-27 VITALS
BODY MASS INDEX: 18.36 KG/M2 | DIASTOLIC BLOOD PRESSURE: 72 MMHG | OXYGEN SATURATION: 97 % | SYSTOLIC BLOOD PRESSURE: 122 MMHG | TEMPERATURE: 97.1 F | HEART RATE: 77 BPM | WEIGHT: 94 LBS

## 2022-10-27 PROCEDURE — 20610 DRAIN/INJ JOINT/BURSA W/O US: CPT | Mod: 50

## 2022-10-27 RX ORDER — SODIUM HYALURONATE INTRA-ARTICULAR SOLN PREF SYR 25 MG/2.5ML 25/2.5 MG/ML
25 SOLUTION PREFILLED SYRINGE INTRAARTICULAR
Refills: 0 | Status: COMPLETED | OUTPATIENT
Start: 2022-10-27

## 2022-10-27 RX ADMIN — SODIUM HYALURONATE INTRA-ARTICULAR SOLN PREF SYR 25 MG/2.5ML 0 MG/2.5ML: 25/2.5 SOLUTION PREFILLED SYRINGE at 00:00

## 2022-11-03 ENCOUNTER — APPOINTMENT (OUTPATIENT)
Dept: RHEUMATOLOGY | Facility: CLINIC | Age: 82
End: 2022-11-03

## 2022-11-03 VITALS
TEMPERATURE: 97.8 F | OXYGEN SATURATION: 95 % | HEART RATE: 89 BPM | WEIGHT: 94 LBS | SYSTOLIC BLOOD PRESSURE: 110 MMHG | BODY MASS INDEX: 18.46 KG/M2 | HEIGHT: 60 IN | DIASTOLIC BLOOD PRESSURE: 70 MMHG

## 2022-11-03 PROCEDURE — 20610 DRAIN/INJ JOINT/BURSA W/O US: CPT | Mod: 50

## 2022-11-03 RX ORDER — SODIUM HYALURONATE INTRA-ARTICULAR SOLN PREF SYR 25 MG/2.5ML 25/2.5 MG/ML
25 SOLUTION PREFILLED SYRINGE INTRAARTICULAR
Refills: 0 | Status: COMPLETED | OUTPATIENT
Start: 2022-11-03

## 2022-11-03 RX ADMIN — SODIUM HYALURONATE INTRA-ARTICULAR SOLN PREF SYR 25 MG/2.5ML 0 MG/2.5ML: 25/2.5 SOLUTION PREFILLED SYRINGE at 00:00

## 2022-11-03 NOTE — PROCEDURE
[FreeTextEntry1] : The procedure risks was discussed with the patient.\par Indications: therapeutic purposes \par #1 site identified in the right knee . Verbal consent was obtained. \par Anesthesia was with ethyl chloride.\par The patient was prepped with betadine solution. \par A 21 gauge 1.5 inch needle was used.\par Injectables: 1mL of xylocaine 1% for local anesthetic and 25mg Supartz to right knee. \par The patient tolerated the procedure well. No fluid was aspirated. \par There were no complications. Handouts/patient instructions were given to patient . patient was instructed to call if redness at site, a decrease in range of motion or an increase in pain is noted after procedure. \par \par #2 site identified in the left knee . Verbal consent was obtained. \par Anesthesia was with ethyl chloride.\par The patient was prepped with betadine solution. \par A 21 gauge 1.5 inch needle was used.\par Injectables: 1mL of xylocaine 1% for local anesthetic. Injected 25mg Supartz to left knee. \par The patient tolerated the procedure well. No fluid was aspirated. \par There were no complications. Handouts/patient instructions were given to patient . patient was instructed to call if redness at site, a decrease in range of motion or an increase in pain is noted after procedure. \par \par \par \par

## 2022-11-10 ENCOUNTER — APPOINTMENT (OUTPATIENT)
Dept: RHEUMATOLOGY | Facility: CLINIC | Age: 82
End: 2022-11-10

## 2022-11-10 VITALS
WEIGHT: 100 LBS | HEART RATE: 102 BPM | TEMPERATURE: 97.1 F | OXYGEN SATURATION: 97 % | HEIGHT: 60 IN | DIASTOLIC BLOOD PRESSURE: 80 MMHG | SYSTOLIC BLOOD PRESSURE: 142 MMHG | BODY MASS INDEX: 19.63 KG/M2

## 2022-11-10 PROCEDURE — 20610 DRAIN/INJ JOINT/BURSA W/O US: CPT | Mod: 50

## 2022-11-10 RX ORDER — SODIUM HYALURONATE INTRA-ARTICULAR SOLN PREF SYR 25 MG/2.5ML 25/2.5 MG/ML
25 SOLUTION PREFILLED SYRINGE INTRAARTICULAR
Refills: 0 | Status: COMPLETED | OUTPATIENT
Start: 2022-11-10

## 2022-11-10 RX ADMIN — SODIUM HYALURONATE INTRA-ARTICULAR SOLN PREF SYR 25 MG/2.5ML 0 MG/2.5ML: 25/2.5 SOLUTION PREFILLED SYRINGE at 00:00

## 2022-11-17 NOTE — ASU PATIENT PROFILE, ADULT - NSICDXPASTMEDICALHX_GEN_ALL_CORE_FT
PAST MEDICAL HISTORY:  Arthritis     Asthma     COPD (chronic obstructive pulmonary disease)     GERD (gastroesophageal reflux disease)

## 2022-11-17 NOTE — ASU DISCHARGE PLAN (ADULT/PEDIATRIC) - NS MD DC FALL RISK RISK
For information on Fall & Injury Prevention, visit: [email or web address] OR  [email or web address] OR  https://www.cdc.gov/steadi/patient.html

## 2022-11-18 ENCOUNTER — OUTPATIENT (OUTPATIENT)
Dept: OUTPATIENT SERVICES | Facility: HOSPITAL | Age: 82
LOS: 1 days | End: 2022-11-18
Payer: MEDICARE

## 2022-11-18 DIAGNOSIS — Z20.828 CONTACT WITH AND (SUSPECTED) EXPOSURE TO OTHER VIRAL COMMUNICABLE DISEASES: ICD-10-CM

## 2022-11-18 LAB — SARS-COV-2 RNA SPEC QL NAA+PROBE: SIGNIFICANT CHANGE UP

## 2022-11-18 PROCEDURE — U0003: CPT

## 2022-11-18 PROCEDURE — U0005: CPT

## 2022-11-21 ENCOUNTER — OUTPATIENT (OUTPATIENT)
Dept: OUTPATIENT SERVICES | Facility: HOSPITAL | Age: 82
LOS: 1 days | End: 2022-11-21
Payer: MEDICARE

## 2022-11-21 ENCOUNTER — TRANSCRIPTION ENCOUNTER (OUTPATIENT)
Age: 82
End: 2022-11-21

## 2022-11-21 VITALS
HEART RATE: 87 BPM | SYSTOLIC BLOOD PRESSURE: 150 MMHG | RESPIRATION RATE: 23 BRPM | TEMPERATURE: 98 F | DIASTOLIC BLOOD PRESSURE: 80 MMHG | OXYGEN SATURATION: 97 % | HEIGHT: 60 IN | WEIGHT: 100.09 LBS

## 2022-11-21 VITALS
HEART RATE: 89 BPM | OXYGEN SATURATION: 99 % | DIASTOLIC BLOOD PRESSURE: 79 MMHG | RESPIRATION RATE: 21 BRPM | SYSTOLIC BLOOD PRESSURE: 145 MMHG

## 2022-11-21 DIAGNOSIS — Z98.890 OTHER SPECIFIED POSTPROCEDURAL STATES: Chronic | ICD-10-CM

## 2022-11-21 DIAGNOSIS — M54.16 RADICULOPATHY, LUMBAR REGION: ICD-10-CM

## 2022-11-21 PROCEDURE — 62323 NJX INTERLAMINAR LMBR/SAC: CPT

## 2022-11-21 NOTE — ASU PATIENT PROFILE, ADULT - FALL HARM RISK - UNIVERSAL INTERVENTIONS
Bed in lowest position, wheels locked, appropriate side rails in place/Call bell, personal items and telephone in reach/Instruct patient to call for assistance before getting out of bed or chair/Non-slip footwear when patient is out of bed/[place] to call system/Physically safe environment - no spills, clutter or unnecessary equipment/Purposeful Proactive Rounding/Room/bathroom lighting operational, light cord in reach

## 2022-12-13 ENCOUNTER — NON-APPOINTMENT (OUTPATIENT)
Age: 82
End: 2022-12-13
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
## 2022-12-13 DIAGNOSIS — M25.461 EFFUSION, RIGHT KNEE: ICD-10-CM

## 2022-12-13 DIAGNOSIS — Z87.891 PERSONAL HISTORY OF NICOTINE DEPENDENCE: ICD-10-CM

## 2022-12-13 DIAGNOSIS — S83.232A COMPLEX TEAR OF MEDIAL MENISCUS, CURRENT INJURY, LEFT KNEE, INITIAL ENCOUNTER: ICD-10-CM

## 2022-12-13 DIAGNOSIS — S80.02XA CONTUSION OF LEFT KNEE, INITIAL ENCOUNTER: ICD-10-CM

## 2022-12-13 DIAGNOSIS — M17.12 UNILATERAL PRIMARY OSTEOARTHRITIS, LEFT KNEE: ICD-10-CM
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
## 2022-12-13 DIAGNOSIS — S80.01XA CONTUSION OF RIGHT KNEE, INITIAL ENCOUNTER: ICD-10-CM

## 2022-12-13 RX ORDER — VITAMIN K2 90 MCG
125 MCG CAPSULE ORAL
Refills: 0 | Status: ACTIVE | COMMUNITY

## 2022-12-13 RX ORDER — SUMATRIPTAN SUCCINATE 100 MG/1
100 TABLET, FILM COATED ORAL
Refills: 0 | Status: ACTIVE | COMMUNITY

## 2022-12-14 LAB
ALBUMIN SERPL ELPH-MCNC: 3.8 G/DL
ALP BLD-CCNC: 72 U/L
ALT SERPL-CCNC: 12 U/L
ANION GAP SERPL CALC-SCNC: 10 MMOL/L
AST SERPL-CCNC: 22 U/L
BASOPHILS # BLD AUTO: 0.11 K/UL
BASOPHILS NFR BLD AUTO: 1.3 %
BILIRUB SERPL-MCNC: 0.2 MG/DL
BUN SERPL-MCNC: 31 MG/DL
CALCIUM SERPL-MCNC: 9.4 MG/DL
CHLORIDE SERPL-SCNC: 106 MMOL/L
CO2 SERPL-SCNC: 25 MMOL/L
CREAT SERPL-MCNC: 1.09 MG/DL
CRP SERPL-MCNC: <3 MG/L
EGFR: 51 ML/MIN/1.73M2
EOSINOPHIL # BLD AUTO: 0.36 K/UL
EOSINOPHIL NFR BLD AUTO: 4.2 %
ERYTHROCYTE [SEDIMENTATION RATE] IN BLOOD BY WESTERGREN METHOD: 20 MM/HR
GLUCOSE SERPL-MCNC: 101 MG/DL
HBV CORE IGG+IGM SER QL: NONREACTIVE
HBV SURFACE AB SERPL IA-ACNC: <3 MIU/ML
HBV SURFACE AG SER QL: NONREACTIVE
HCT VFR BLD CALC: 39.1 %
HCV AB SER QL: NONREACTIVE
HCV S/CO RATIO: 0.06 S/CO
HGB BLD-MCNC: 12 G/DL
IMM GRANULOCYTES NFR BLD AUTO: 0.3 %
LYMPHOCYTES # BLD AUTO: 1.7 K/UL
LYMPHOCYTES NFR BLD AUTO: 19.7 %
MAN DIFF?: NORMAL
MCHC RBC-ENTMCNC: 30.6 PG
MCHC RBC-ENTMCNC: 30.7 GM/DL
MCV RBC AUTO: 99.7 FL
MONOCYTES # BLD AUTO: 0.52 K/UL
MONOCYTES NFR BLD AUTO: 6 %
NEUTROPHILS # BLD AUTO: 5.92 K/UL
NEUTROPHILS NFR BLD AUTO: 68.5 %
NT-PROBNP SERPL-MCNC: 292 PG/ML
PLATELET # BLD AUTO: 276 K/UL
POTASSIUM SERPL-SCNC: 5.1 MMOL/L
PROT SERPL-MCNC: 6.4 G/DL
RBC # BLD: 3.92 M/UL
RBC # FLD: 13.3 %
SODIUM SERPL-SCNC: 142 MMOL/L
WBC # FLD AUTO: 8.64 K/UL

## 2022-12-15 ENCOUNTER — NON-APPOINTMENT (OUTPATIENT)
Age: 82
End: 2022-12-15

## 2022-12-15 ENCOUNTER — APPOINTMENT (OUTPATIENT)
Dept: RHEUMATOLOGY | Facility: CLINIC | Age: 82
End: 2022-12-15

## 2022-12-15 VITALS
WEIGHT: 110 LBS | HEIGHT: 60 IN | SYSTOLIC BLOOD PRESSURE: 140 MMHG | BODY MASS INDEX: 21.6 KG/M2 | DIASTOLIC BLOOD PRESSURE: 80 MMHG

## 2022-12-15 DIAGNOSIS — M35.1 OTHER OVERLAP SYNDROMES: ICD-10-CM

## 2022-12-15 PROBLEM — K21.9 GASTRO-ESOPHAGEAL REFLUX DISEASE WITHOUT ESOPHAGITIS: Chronic | Status: ACTIVE | Noted: 2022-11-17

## 2022-12-15 PROCEDURE — 99214 OFFICE O/P EST MOD 30 MIN: CPT

## 2022-12-15 RX ORDER — BUDESONIDE AND FORMOTEROL FUMARATE DIHYDRATE 160; 4.5 UG/1; UG/1
160-4.5 AEROSOL RESPIRATORY (INHALATION)
Qty: 1 | Refills: 1 | Status: DISCONTINUED | COMMUNITY
Start: 2017-10-31 | End: 2022-12-15

## 2022-12-15 RX ORDER — DEXTROAMPHETAMINE SULFATE, DEXTROAMPHETAMINE SACCHARATE, AMPHETAMINE SULFATE AND AMPHETAMINE ASPARTATE 7.5; 7.5; 7.5; 7.5 MG/1; MG/1; MG/1; MG/1
30 CAPSULE, EXTENDED RELEASE ORAL DAILY
Refills: 0 | Status: DISCONTINUED | COMMUNITY
End: 2022-12-15

## 2022-12-15 RX ORDER — DEXTROAMPHETAMINE SACCHARATE, AMPHETAMINE ASPARTATE, DEXTROAMPHETAMINE SULFATE, AND AMPHETAMINE SULFATE 3.75; 3.75; 3.75; 3.75 MG/1; MG/1; MG/1; MG/1
15 TABLET ORAL DAILY
Refills: 0 | Status: DISCONTINUED | COMMUNITY
End: 2022-12-15

## 2022-12-15 NOTE — HISTORY OF PRESENT ILLNESS
[FreeTextEntry1] : 80 y/o woman with hx of GERD, CHF, COPD/asthmatic component, borderline elevated HTN, HLD on crestor, degenerative disc disease of spine, and on adderall for paralytic sleep and attention deficit, here for f/u of her inflammatory arthritis. I am treating her for seronegative RA/with +RNP/-GABBY. She is on plaquenil 200mg BID and Rituxan. Rituxan started 4/2019. \par \par Supartz was also helpful, at least 50% improvement. She completed a series to b/L knees on 11/10/2022.  \par \par Last prolia was 9/27/22, next due after 3/28/23.  \par \par She had been happy with the PLQ/Rituxan combination. Last Rituxan was 6/29/21\par \par Current pain is minimal, and nly in right thumb. \par November 21st she had an epidural with Dr. Momin.  \par \par \par Had her last eye exam 12/2021\par \par She saw Dr. Daily. Was told not to take pantoprazole. States it didn't help her anyway. Was told to take pepto Bismol. \par \par She had CT chest 11/05/20 which shows bronchiectasis, few ill-defined opacities in peripheral aspect, scattered tree in bud opacities within lungs. \par \par Cancer screening:\par Last mammogram 12/21/2020 negative\par EGD 1/19/18: Erosive gastritis\par Colonoscopy 1/19/18: normal. \par

## 2022-12-15 NOTE — ASSESSMENT
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 She is currently off prednisone. She is no longer on Rituxan due to covid climate, and wants to continue to remain off of it. Her RA is relatively stable. \par Her OA is improved. \par \par Plan:\par RA/+RNP: overall stable. \par -continue plaquenil 200mg BID for seronegative RA/+RNP. G6PD is WNL. She goes to her eye doctor for monitoring q6 months.Had her last eye exam 12/2021. Due again now. \par -Off MTX and folic acid due to intolerance. \par -IM depomedrol given previously for ankle pains and right 1st MCP swelling\par -Did well on Rituxan. Last dose 6/29/21. Wants to delay the next one as long as she can due to covid. She previously received Evusheld. \par -We discussed possibly moving to actemra\par \par Pulmonary infiltrates\par -REcent CT chest ordered due to weight loss shows peripheral infiltrates. She had discussed possible bronchoscopy, and possible tx fo MAC. I've encouraged her to follow up regarding this.  \par -She has hx of smoking, distant.\par -routine screening with PFTs and ECHO.\par -prev declined amlodipine for raynaud's. To continue to use conservative measures for hand warming. \par -f/u Dr. Walls and Dr. Caputo\par \par Right shoulder limited ROM-\par -prevent adhesive capsulitis\par \par Left shoulder pain/impingement- not active. \par -Injected right SAB 3/6/20 witih 80mg depomedrol. \par -80mg depomedrol administered to left SAB 12/4/18- didn't help too much\par \par \par Knee OA- doing well today\par -Injected left knee 3/6/20 with 80mg depomedrol after aspirating 8 cc of fluid from left knee. \par -HA injections eligible again 10/11/22\par -PT program for back\par \par Vit D deficiency/clinical osteoporosis\par -Completed 50,000IU q week x 4 weeks. currently back on 2000IU daily. \par -Vit D is WNL.\par -Check Vit D with next labs\par -DEXA shows osteopenia. With her tibia fracture, she has diagnosis of clinical osteoporosis. \par -prolia due after 3/28/2023\par \par labs before next visit\par Next routine RPA 3-4 months\par  \par \par \par . \par

## 2023-01-26 ENCOUNTER — APPOINTMENT (OUTPATIENT)
Dept: DERMATOLOGY | Facility: CLINIC | Age: 83
End: 2023-01-26
Payer: MEDICARE

## 2023-01-26 DIAGNOSIS — D18.01 HEMANGIOMA OF SKIN AND SUBCUTANEOUS TISSUE: ICD-10-CM

## 2023-01-26 PROCEDURE — 99213 OFFICE O/P EST LOW 20 MIN: CPT

## 2023-01-26 NOTE — HISTORY OF PRESENT ILLNESS
[FreeTextEntry1] : Patient presents for skin examination. [de-identified] : Denies new, changing, bleeding or tender lesions on the skin over the past year.\par

## 2023-02-09 ENCOUNTER — NON-APPOINTMENT (OUTPATIENT)
Age: 83
End: 2023-02-09

## 2023-02-09 DIAGNOSIS — Z60.2 PROBLEMS RELATED TO LIVING ALONE: ICD-10-CM

## 2023-02-09 DIAGNOSIS — G89.29 OTHER CHRONIC PAIN: ICD-10-CM

## 2023-02-09 DIAGNOSIS — Z87.19 PERSONAL HISTORY OF OTHER DISEASES OF THE DIGESTIVE SYSTEM: ICD-10-CM

## 2023-02-09 DIAGNOSIS — Z86.69 PERSONAL HISTORY OF OTHER DISEASES OF THE NERVOUS SYSTEM AND SENSE ORGANS: ICD-10-CM

## 2023-02-09 DIAGNOSIS — Z87.39 PERSONAL HISTORY OF OTHER DISEASES OF THE MUSCULOSKELETAL SYSTEM AND CONNECTIVE TISSUE: ICD-10-CM

## 2023-02-09 SDOH — SOCIAL STABILITY - SOCIAL INSECURITY: PROBLEMS RELATED TO LIVING ALONE: Z60.2

## 2023-02-13 ENCOUNTER — APPOINTMENT (OUTPATIENT)
Dept: PAIN MANAGEMENT | Facility: CLINIC | Age: 83
End: 2023-02-13
Payer: MEDICARE

## 2023-02-13 VITALS — HEIGHT: 61 IN | WEIGHT: 110 LBS | BODY MASS INDEX: 20.77 KG/M2

## 2023-02-13 PROCEDURE — 99213 OFFICE O/P EST LOW 20 MIN: CPT | Mod: 95

## 2023-02-13 NOTE — ASSESSMENT
[FreeTextEntry1] : Interim history\par he patient returns with pain that has been treated adequately in the past with epidural steroid injections.  The patient's complaints are consistent with radiculopathy. Patient's ADL's increase with prior ARTURO.   The last injection gave greater than 60% reduction of the pain but now there is a return of symptoms. The patient is requesting a subsequent epidural steroid injection to alleviate pain and improve functional ability and quality of life.  Patient is a candidate.\par Objective findings\par Since the last visit, there have been no new imaging studies. THe patient has no new symptoms except the return of the their pain complaints. Motor and sensory function is unchanged.\par Plan\par Spoke to patient about epidural steroid injections. Explained risks, benefits and alternatives including but not limited to the risk of infection, bleeding, headache, syncopal episode, failure to resolve issues, allergic reaction, symptom recurrence, allergic reaction, nerve injury, and increased pain. The patient understands the risks. All questions were answered. The patient is willing to proceed.\par

## 2023-02-13 NOTE — HISTORY OF PRESENT ILLNESS
[Lower back] : lower back [9] : 9 [Dull/Aching] : dull/aching [Shooting] : shooting [Constant] : constant [Rest] : rest [Walking] : walking [Disabled] : Work status: disabled [Home] : at home, [unfilled] , at the time of the visit. [Medical Office: ([facility])___] : at the medical office located in  [Verbal consent obtained from patient] : the patient, [unfilled] [] : Post Surgical Visit: no

## 2023-02-14 NOTE — ASU PATIENT PROFILE, ADULT - NSICDXPASTMEDICALHX_GEN_ALL_CORE_FT
PAST MEDICAL HISTORY:  Arthritis     Asthma     COPD (chronic obstructive pulmonary disease)     GERD (gastroesophageal reflux disease)      Dressing (No Sutures): dry sterile dressing

## 2023-02-22 ENCOUNTER — TRANSCRIPTION ENCOUNTER (OUTPATIENT)
Age: 83
End: 2023-02-22

## 2023-02-22 NOTE — ASU DISCHARGE PLAN (ADULT/PEDIATRIC) - NS MD DC FALL RISK RISK
For information on Fall & Injury Prevention, visit: [email or web address] OR  [email or web address] OR  https://www.cdc.gov/steadi/patient.html

## 2023-02-24 ENCOUNTER — OUTPATIENT (OUTPATIENT)
Dept: OUTPATIENT SERVICES | Facility: HOSPITAL | Age: 83
LOS: 1 days | End: 2023-02-24
Payer: MEDICARE

## 2023-02-24 DIAGNOSIS — Z98.890 OTHER SPECIFIED POSTPROCEDURAL STATES: Chronic | ICD-10-CM

## 2023-02-24 DIAGNOSIS — Z20.828 CONTACT WITH AND (SUSPECTED) EXPOSURE TO OTHER VIRAL COMMUNICABLE DISEASES: ICD-10-CM

## 2023-02-24 LAB — SARS-COV-2 RNA SPEC QL NAA+PROBE: SIGNIFICANT CHANGE UP

## 2023-02-24 PROCEDURE — U0005: CPT

## 2023-02-24 PROCEDURE — U0003: CPT

## 2023-02-27 ENCOUNTER — OUTPATIENT (OUTPATIENT)
Dept: OUTPATIENT SERVICES | Facility: HOSPITAL | Age: 83
LOS: 1 days | End: 2023-02-27
Payer: MEDICARE

## 2023-02-27 ENCOUNTER — APPOINTMENT (OUTPATIENT)
Dept: ORTHOPEDIC SURGERY | Facility: HOSPITAL | Age: 83
End: 2023-02-27
Payer: MEDICARE

## 2023-02-27 VITALS
RESPIRATION RATE: 20 BRPM | OXYGEN SATURATION: 99 % | DIASTOLIC BLOOD PRESSURE: 86 MMHG | TEMPERATURE: 98 F | HEART RATE: 80 BPM | SYSTOLIC BLOOD PRESSURE: 158 MMHG

## 2023-02-27 VITALS
SYSTOLIC BLOOD PRESSURE: 157 MMHG | OXYGEN SATURATION: 99 % | WEIGHT: 102.07 LBS | TEMPERATURE: 98 F | HEIGHT: 62 IN | RESPIRATION RATE: 17 BRPM | HEART RATE: 85 BPM | DIASTOLIC BLOOD PRESSURE: 69 MMHG

## 2023-02-27 DIAGNOSIS — Z98.890 OTHER SPECIFIED POSTPROCEDURAL STATES: Chronic | ICD-10-CM

## 2023-02-27 DIAGNOSIS — M54.16 RADICULOPATHY, LUMBAR REGION: ICD-10-CM

## 2023-02-27 PROCEDURE — 62323 NJX INTERLAMINAR LMBR/SAC: CPT

## 2023-02-27 PROCEDURE — ZZZZZ: CPT

## 2023-03-03 ENCOUNTER — APPOINTMENT (OUTPATIENT)
Dept: GASTROENTEROLOGY | Facility: AMBULATORY MEDICAL SERVICES | Age: 83
End: 2023-03-03
Payer: MEDICARE

## 2023-03-03 PROCEDURE — 43239 EGD BIOPSY SINGLE/MULTIPLE: CPT

## 2023-03-03 PROCEDURE — 45378 DIAGNOSTIC COLONOSCOPY: CPT

## 2023-03-13 ENCOUNTER — APPOINTMENT (OUTPATIENT)
Dept: PAIN MANAGEMENT | Facility: CLINIC | Age: 83
End: 2023-03-13
Payer: MEDICARE

## 2023-03-13 VITALS — HEIGHT: 61 IN | BODY MASS INDEX: 20.77 KG/M2 | WEIGHT: 110 LBS

## 2023-03-13 PROCEDURE — 20553 NJX 1/MLT TRIGGER POINTS 3/>: CPT | Mod: LT

## 2023-03-13 NOTE — HISTORY OF PRESENT ILLNESS
[Lower back] : lower back [9] : 9 [Dull/Aching] : dull/aching [Shooting] : shooting [Constant] : constant [Rest] : rest [Walking] : walking [Disabled] : Work status: disabled [Home] : at home, [unfilled] , at the time of the visit. [Medical Office: ([facility])___] : at the medical office located in  [Verbal consent obtained from patient] : the patient, [unfilled] [de-identified] : 03/13/2023- PATIENT STATES HAS NOT  COMPLETED PHYSICAL THERAPY. \par PT STATES HAS NOT COMPLETED HOME STRETCHING PROGRAM.  [] : Post Surgical Visit: no

## 2023-03-13 NOTE — ASSESSMENT
[FreeTextEntry1] : Interim history\par The patient returns to the office with pain complaints that we have treated successfully in the past with trigger point injections. The patient states that the pains are the in the same locations and feel the same as they have in the past. The patient denies new symptoms.\par Objective findings\par The patient has multiple areas of tenderness. Pressure on which recreated the patient's pain complaints. The are no additional changes in the patient's physical status\par Plan\par After a sterile alcohol prep and per office protocol, the patient is given 3 trigger point injections.  The areas injected were from approximately L3-L5 on the left side.  THese were performed through a 27 gauge needle and 0.25% marcaine was used as the injectate. The patient tolerated the procedures without incident.\par

## 2023-03-27 ENCOUNTER — RX CHANGE (OUTPATIENT)
Age: 83
End: 2023-03-27

## 2023-03-27 RX ORDER — SUCRALFATE 1 G/1
1 TABLET ORAL
Qty: 270 | Refills: 3 | Status: ACTIVE | COMMUNITY
Start: 2023-03-03

## 2023-03-29 LAB
25(OH)D3 SERPL-MCNC: 58.1 NG/ML
ALBUMIN SERPL ELPH-MCNC: 4.3 G/DL
ALP BLD-CCNC: 88 U/L
ALT SERPL-CCNC: 16 U/L
ANA SER IF-ACNC: NEGATIVE
ANION GAP SERPL CALC-SCNC: 12 MMOL/L
AST SERPL-CCNC: 25 U/L
BASOPHILS # BLD AUTO: 0.09 K/UL
BASOPHILS NFR BLD AUTO: 1.1 %
BILIRUB SERPL-MCNC: 0.4 MG/DL
BUN SERPL-MCNC: 21 MG/DL
C3 SERPL-MCNC: 116 MG/DL
C4 SERPL-MCNC: 47 MG/DL
CALCIUM SERPL-MCNC: 9.8 MG/DL
CHLORIDE SERPL-SCNC: 106 MMOL/L
CO2 SERPL-SCNC: 24 MMOL/L
CREAT SERPL-MCNC: 0.91 MG/DL
CREAT SPEC-SCNC: 24 MG/DL
CREAT/PROT UR: 0.2 RATIO
CRP SERPL-MCNC: 4 MG/L
EGFR: 63 ML/MIN/1.73M2
ENA RNP AB SER IA-ACNC: 0.5 AL
ENA SM AB SER IA-ACNC: <0.2 AL
EOSINOPHIL # BLD AUTO: 0.41 K/UL
EOSINOPHIL NFR BLD AUTO: 4.8 %
ERYTHROCYTE [SEDIMENTATION RATE] IN BLOOD BY WESTERGREN METHOD: 15 MM/HR
GLUCOSE SERPL-MCNC: 95 MG/DL
HCT VFR BLD CALC: 40 %
HGB BLD-MCNC: 12.3 G/DL
IMM GRANULOCYTES NFR BLD AUTO: 0.2 %
LYMPHOCYTES # BLD AUTO: 1.73 K/UL
LYMPHOCYTES NFR BLD AUTO: 20.3 %
MAN DIFF?: NORMAL
MCHC RBC-ENTMCNC: 30.3 PG
MCHC RBC-ENTMCNC: 30.8 GM/DL
MCV RBC AUTO: 98.5 FL
MONOCYTES # BLD AUTO: 0.46 K/UL
MONOCYTES NFR BLD AUTO: 5.4 %
NEUTROPHILS # BLD AUTO: 5.81 K/UL
NEUTROPHILS NFR BLD AUTO: 68.2 %
PLATELET # BLD AUTO: 265 K/UL
POTASSIUM SERPL-SCNC: 5 MMOL/L
PROT SERPL-MCNC: 6.8 G/DL
PROT UR-MCNC: 4 MG/DL
RBC # BLD: 4.06 M/UL
RBC # FLD: 13.2 %
SODIUM SERPL-SCNC: 142 MMOL/L
WBC # FLD AUTO: 8.52 K/UL

## 2023-03-30 ENCOUNTER — APPOINTMENT (OUTPATIENT)
Dept: RHEUMATOLOGY | Facility: CLINIC | Age: 83
End: 2023-03-30
Payer: MEDICARE

## 2023-03-30 VITALS
BODY MASS INDEX: 20.78 KG/M2 | SYSTOLIC BLOOD PRESSURE: 128 MMHG | OXYGEN SATURATION: 97 % | WEIGHT: 110 LBS | HEART RATE: 87 BPM | TEMPERATURE: 97.6 F | DIASTOLIC BLOOD PRESSURE: 74 MMHG

## 2023-03-30 DIAGNOSIS — M80.80XA OTHER OSTEOPOROSIS WITH CURRENT PATHOLOGICAL FRACTURE, UNSPECIFIED SITE, INITIAL ENCOUNTER FOR FRACTURE: ICD-10-CM

## 2023-03-30 PROCEDURE — 99214 OFFICE O/P EST MOD 30 MIN: CPT | Mod: 25

## 2023-03-30 PROCEDURE — 96372 THER/PROPH/DIAG INJ SC/IM: CPT

## 2023-03-30 RX ORDER — DEXTROAMPHETAMINE SULFATE, DEXTROAMPHETAMINE SACCHARATE, AMPHETAMINE SULFATE AND AMPHETAMINE ASPARTATE 7.5; 7.5; 7.5; 7.5 MG/1; MG/1; MG/1; MG/1
30 CAPSULE, EXTENDED RELEASE ORAL
Refills: 0 | Status: DISCONTINUED | COMMUNITY
End: 2023-03-30

## 2023-03-30 RX ORDER — NAPROXEN 500 MG/1
500 TABLET ORAL
Refills: 0 | Status: DISCONTINUED | COMMUNITY
End: 2023-03-30

## 2023-03-30 RX ORDER — VARDENAFIL HYDROCHLORIDE 20 MG/1
20 TABLET, FILM COATED ORAL DAILY
Refills: 0 | Status: DISCONTINUED | COMMUNITY
End: 2023-03-30

## 2023-03-30 RX ORDER — DENOSUMAB 60 MG/ML
60 INJECTION SUBCUTANEOUS
Qty: 160 | Refills: 0 | Status: COMPLETED | OUTPATIENT
Start: 2023-03-30

## 2023-03-30 RX ADMIN — DENOSUMAB 0 MG/ML: 60 INJECTION SUBCUTANEOUS at 00:00

## 2023-04-03 ENCOUNTER — MED ADMIN CHARGE (OUTPATIENT)
Age: 83
End: 2023-04-03

## 2023-04-03 PROBLEM — M80.80XA OSTEOPOROSIS WITH FRACTURE: Status: ACTIVE | Noted: 2021-02-25

## 2023-04-03 RX ORDER — DENOSUMAB 60 MG/ML
60 INJECTION SUBCUTANEOUS
Qty: 60 | Refills: 0 | Status: COMPLETED | OUTPATIENT
Start: 2023-04-03

## 2023-04-03 RX ADMIN — DENOSUMAB 0 MG/ML: 60 INJECTION SUBCUTANEOUS at 00:00

## 2023-04-03 NOTE — ASSESSMENT
[FreeTextEntry1] : \par \par \par 81 y/o woman with hx of COPD/asthma, HLD, paralytic sleep disorder, erosive gastritis, DJD/Levoscoliosis spine, presents for seronegative RA/+RNP wth negative GABBY (combination of inflammatory arthritis, raynaud's, fatigue, b/L nodular opacities seen in lung likely related). She is currently off prednisone. She is no longer on Rituxan due to covid climate, and wants to continue to remain off of it. Her RA is relatively stable. \par Her OA is improved. \par \par Plan:\par RA/+RNP: overall stable. \par -continue plaquenil 200mg BID for seronegative RA/+RNP.  Her next eye appt is 6/1/23.  \par -Off MTX and folic acid due to intolerance. \par -IM depomedrol given previously for ankle pains and right 1st MCP swelling\par -Did well on Rituxan. Last dose 6/29/21. Wanted to delay the next one as long as she can due to covid. She previously received Evusheld. \par -We discussed possibly moving to actemra\par -avoid  NSAIDS due to erosive gastritis\par \par Pulmonary infiltrates\par -REcent CT chest ordered due to weight loss shows peripheral infiltrates. She had discussed possible bronchoscopy, and possible tx fo MAC. I've encouraged her to keep follow up with pulm regarding this. \par -She has hx of smoking, distant.\par -routine screening with PFTs and ECHO.\par -prev declined amlodipine for raynaud's. To continue to use conservative measures for hand warming. \par -f/u Dr. Walls and Dr. Caputo\par \par back pain\par -finds the epidurals helpful\par - PT script given\par -difficult for her to take her tizanidine as it makes her sleepy\par \par Right shoulder limited ROM-\par -prevent adhesive capsulitis\par \par Left shoulder pain/impingement- not active. \par -Injected right SAB 3/6/20 with 80mg depomedrol. \par -80mg depomedrol administered to left SAB 12/4/18- didn't help too much\par \par \par Knee OA- stable\par -Injected left knee 3/6/20 with 80mg depomedrol after aspirating 8 cc of fluid from left knee. \par -She prefers having these done under image guidance.  She will have these done with ortho.  \par \par \par Vit D deficiency/clinical osteoporosis\par -Completed 50,000IU q week x 4 weeks. currently back on 2000IU daily. \par -DEXA shows osteopenia. With her tibia fracture, she has diagnosis of clinical osteoporosis. \par -prolia given 3/30/23, next due on or after 10/2/23\par \par \par rpa 3-4 months\par labs before next visit\par \par \par

## 2023-04-03 NOTE — PROCEDURE
[FreeTextEntry1] : Skin was prepped with alcohol, and 60mg of Prolia was injected subQ to left arm. Patient tolerated injection well. \par

## 2023-04-03 NOTE — HISTORY OF PRESENT ILLNESS
[FreeTextEntry1] : 81 y/o woman with hx of GERD, CHF, COPD/asthmatic component, borderline elevated HTN, HLD on crestor, degenerative disc disease of spine, and on adderall for paralytic sleep and attention deficit, here for f/u of her inflammatory arthritis. I am treating her for seronegative RA/with +RNP/-GABBY. She is on plaquenil 200mg BID and Rituxan. Rituxan started 4/2019. \par \par Supartz was also helpful, at least 50% improvement. She completed a series to b/L knees on 11/10/2022. \par Wants with US guidance next time.\par \par June 1st is next eye  exam. Had her last eye exam 12/2021\par \par She had been happy with the PLQ/Rituxan combination. Last Rituxan was 6/29/21\par \par Current pain is in knees and 1st MCPs.  She also has pain in left lower back.  \par November 21st she had an epidural with Dr. Momin. It helped and she'd like to have it done q 3 months if possible.  \par \par She saw Dr. Nguyễn who told her not to take naproxen due to hx of erosive gastritis.  \par \par She had CT chest 11/05/20 which shows bronchiectasis, few ill-defined opacities in peripheral aspect, scattered tree in bud opacities within lungs. \par \par No dental procedures planned.  \par \par Cancer screening:\par Last mammogram 12/21/2020 negative\par EGD 1/19/18 and 3/1/23  Erosive gastritis\par Colonoscopy 3/1/2023: normal. \par \par \par \par

## 2023-04-04 ENCOUNTER — APPOINTMENT (OUTPATIENT)
Dept: ORTHOPEDIC SURGERY | Facility: CLINIC | Age: 83
End: 2023-04-04
Payer: MEDICARE

## 2023-04-04 ENCOUNTER — NON-APPOINTMENT (OUTPATIENT)
Age: 83
End: 2023-04-04

## 2023-04-04 VITALS
DIASTOLIC BLOOD PRESSURE: 81 MMHG | HEIGHT: 61 IN | HEART RATE: 98 BPM | BODY MASS INDEX: 20.77 KG/M2 | WEIGHT: 110 LBS | SYSTOLIC BLOOD PRESSURE: 149 MMHG

## 2023-04-04 PROCEDURE — 20610 DRAIN/INJ JOINT/BURSA W/O US: CPT | Mod: 50

## 2023-04-04 PROCEDURE — 73564 X-RAY EXAM KNEE 4 OR MORE: CPT | Mod: 50

## 2023-04-04 PROCEDURE — 99215 OFFICE O/P EST HI 40 MIN: CPT | Mod: 25

## 2023-04-04 NOTE — HISTORY OF PRESENT ILLNESS
[de-identified] : 4/4/2023–patient presents with bilateral knee pain right worse than left.  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  Does complain of buckling pain.  States she has had steroid injections a long time ago which worked very well.  Rheumatology for seronegative RA.  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  Allergies to penicillin, Ceftin and Levaquin–hives.  Denies anticoagulation or other medical issues.

## 2023-04-04 NOTE — PROCEDURE
[de-identified] : 4/4/2023–bilateral knee injection - Steroid\par The risks, benefits, and alternatives of the injection were reviewed/discussed with the patient today in office and all of their questions were answered. The patient consented to the procedure. The inferolateral injection site was prepped with chloroprep.  Cold spray was utilized to numb the skin. Utilizing sterile technique, the knee was injected with 1 ml 40 mg methylprednisolone, 4 ml 1% Lidocaine, 5 mL 0.25% Bupivicaine.  This was repeated for the contralateral extremity.  A sterile bandage was applied. The patient tolerated the procedure well and there were no complications.

## 2023-04-04 NOTE — DISCUSSION/SUMMARY
[de-identified] : Bilateral knee osteoarthritis, left worse than right\par \par Extensive discussion of the natural history of this disease was had with the patient.  We discussed the treatment options ranging from conservative therapy which includes anti-inflammatories, steroid injections, physical therapy, weight loss, and activity modification.  We did discuss that the ultimate treatment for arthritis is a joint replacement.  Like to avoid surgery.  At this time we will continue conservative treatment.  \par -Patient elected for steroid injection today.  She would like to try the gel injections as well.\par She will return in 2 weeks for the gel injections.

## 2023-04-04 NOTE — PHYSICAL EXAM
[de-identified] : General Appearance: normal without acute distress\par Mental: Alert and oriented x 3\par Psych/affect: appropriate, cooperative\par Gait: Normal gait\par \par Bilateral lower extremity\par Hip: Normal ROM without pain on IR/ER\par \par Knee\par Inspection: no effusion\par Wounds: None\par Alignment: Mild varus\par Palpation: tender to palpation at medial joint line\par ROM active (in degrees): 0-140 with crepitus through the arc of motion\par Ligamentous laxity: all ligaments appear stable, stable to varus stress test, stable to valgus stress test\par Meniscal Test: Negative McMurrays, negative Cortez.\par Muscle Test: good quad strength. [de-identified] : 4/4/2023–left knee xrays, standing AP/Lateral and Merchant films, and 45 degree PA standing view are reviewed and demonstrate diffuse tricompartmental degenerative arthritis, [medial] joint space narrowing, marginal osteophytes, bone on bone with sclerosis, patellofemoral joint space narrowing\par [Right] knee xrays, standing AP/Lateral and Merchant films, and 45 degree PA standing view are reviewed and demonstrate mild to moderate tricompartmental degenerative arthritis, [medial] joint space narrowing, patellofemoral joint space narrowing

## 2023-04-17 NOTE — ED ADULT TRIAGE NOTE - AS TEMP SITE
Patient states that he has generalized pain all over his body, is requesting 
something for pain. oral

## 2023-04-18 ENCOUNTER — APPOINTMENT (OUTPATIENT)
Dept: ORTHOPEDIC SURGERY | Facility: CLINIC | Age: 83
End: 2023-04-18
Payer: MEDICARE

## 2023-04-18 PROCEDURE — 20610 DRAIN/INJ JOINT/BURSA W/O US: CPT | Mod: 50

## 2023-04-18 NOTE — PROCEDURE
[de-identified] : 4/18/2023–bilateral knee Injection - hyaluronic acid\par The risks, benefits, and alternatives of the injection were reviewed/discussed with the patient today in office and all of their questions were answered. The patient consented to the procedure. The inferolateral injection site was prepped with chloroprep.  Cold spray was utilized to numb the skin. Utilizing sterile technique, the knee was injected with [Euflexxa]. A sterile bandage was applied.  This was repeated for the contralateral extremity the patient tolerated the procedure well and there were no complications.\par Lot: [id number]\par Exp: 2024-4-14\par B&B

## 2023-04-25 ENCOUNTER — APPOINTMENT (OUTPATIENT)
Dept: ORTHOPEDIC SURGERY | Facility: CLINIC | Age: 83
End: 2023-04-25
Payer: MEDICARE

## 2023-04-25 PROCEDURE — 20610 DRAIN/INJ JOINT/BURSA W/O US: CPT | Mod: 50

## 2023-04-25 NOTE — PROCEDURE
[de-identified] : 4/25/2023–bilateral knee Injection - hyaluronic acid\par The risks, benefits, and alternatives of the injection were reviewed/discussed with the patient today in office and all of their questions were answered. The patient consented to the procedure. The inferolateral injection site was prepped with chloroprep.  Cold spray was utilized to numb the skin. Utilizing sterile technique, the knee was injected with [Euflexxa]. A sterile bandage was applied.  This was repeated for the contralateral extremity the patient tolerated the procedure well and there were no complications.\par Lot: [id number]\par Exp: 2024-4-28\par B&B\par \par 4/18/2023–bilateral knee Injection - hyaluronic acid

## 2023-05-02 ENCOUNTER — APPOINTMENT (OUTPATIENT)
Dept: ORTHOPEDIC SURGERY | Facility: CLINIC | Age: 83
End: 2023-05-02
Payer: MEDICARE

## 2023-05-02 PROCEDURE — 20610 DRAIN/INJ JOINT/BURSA W/O US: CPT | Mod: 50

## 2023-06-01 ENCOUNTER — OFFICE (OUTPATIENT)
Dept: URBAN - METROPOLITAN AREA CLINIC 102 | Facility: CLINIC | Age: 83
Setting detail: OPHTHALMOLOGY
End: 2023-06-01
Payer: MEDICARE

## 2023-06-01 DIAGNOSIS — H25.13: ICD-10-CM

## 2023-06-01 DIAGNOSIS — Z79.891: ICD-10-CM

## 2023-06-01 DIAGNOSIS — H16.221: ICD-10-CM

## 2023-06-01 DIAGNOSIS — H16.223: ICD-10-CM

## 2023-06-01 DIAGNOSIS — H43.813: ICD-10-CM

## 2023-06-01 DIAGNOSIS — H16.222: ICD-10-CM

## 2023-06-01 DIAGNOSIS — H40.033: ICD-10-CM

## 2023-06-01 PROBLEM — H52.7 REFRACTIVE ERROR: Status: ACTIVE | Noted: 2023-06-01

## 2023-06-01 PROCEDURE — 92083 EXTENDED VISUAL FIELD XM: CPT | Performed by: OPHTHALMOLOGY

## 2023-06-01 PROCEDURE — 92014 COMPRE OPH EXAM EST PT 1/>: CPT | Performed by: OPHTHALMOLOGY

## 2023-06-01 PROCEDURE — 92020 GONIOSCOPY: CPT | Performed by: OPHTHALMOLOGY

## 2023-06-01 PROCEDURE — 92134 CPTRZ OPH DX IMG PST SGM RTA: CPT | Performed by: OPHTHALMOLOGY

## 2023-06-01 ASSESSMENT — KERATOMETRY
OS_K1POWER_DIOPTERS: 44.00
OD_K2POWER_DIOPTERS: 44.00
OS_AXISANGLE_DEGREES: 075
METHOD_AUTO_MANUAL: AUTO
OD_K1POWER_DIOPTERS: 43.50
OS_K2POWER_DIOPTERS: 44.75
OD_AXISANGLE_DEGREES: 164

## 2023-06-01 ASSESSMENT — AXIALLENGTH_DERIVED
OS_AL: 23.0402
OD_AL: 22.8886

## 2023-06-01 ASSESSMENT — REFRACTION_AUTOREFRACTION
OD_CYLINDER: -1.75
OD_SPHERE: +2.50
OD_AXIS: 084
OS_CYLINDER: -0.75
OS_AXIS: 118
OS_SPHERE: +1.00

## 2023-06-01 ASSESSMENT — CONFRONTATIONAL VISUAL FIELD TEST (CVF)
OS_FINDINGS: FULL
OD_FINDINGS: FULL

## 2023-06-01 ASSESSMENT — VISUAL ACUITY
OS_BCVA: 20/30
OD_BCVA: 20/30-1

## 2023-06-01 ASSESSMENT — REFRACTION_CURRENTRX
OS_SPHERE: +1.50
OD_ADD: +3.25
OD_CYLINDER: -0.75
OS_ADD: +3.25
OD_OVR_VA: 20/
OS_VPRISM_DIRECTION: PROGS
OS_OVR_VA: 20/
OD_VPRISM_DIRECTION: PROGS
OD_SPHERE: +1.25
OS_CYLINDER: -1.00
OD_OVR_VA: 20/
OS_AXIS: 090
OD_AXIS: 067
OS_OVR_VA: 20/

## 2023-06-01 ASSESSMENT — SUPERFICIAL PUNCTATE KERATITIS (SPK)
OD_SPK: T 1+
OS_SPK: T 1+

## 2023-06-01 ASSESSMENT — SPHEQUIV_DERIVED
OD_SPHEQUIV: 1.625
OS_SPHEQUIV: 0.625

## 2023-06-01 ASSESSMENT — TONOMETRY
OD_IOP_MMHG: 10
OS_IOP_MMHG: 12

## 2023-06-01 ASSESSMENT — DRY EYES - PHYSICIAN NOTES
OS_GENERALCOMMENTS: INFERIOR
OD_GENERALCOMMENTS: INFERIOR

## 2023-06-12 ENCOUNTER — APPOINTMENT (OUTPATIENT)
Dept: PAIN MANAGEMENT | Facility: CLINIC | Age: 83
End: 2023-06-12
Payer: MEDICARE

## 2023-06-12 DIAGNOSIS — M54.16 RADICULOPATHY, LUMBAR REGION: ICD-10-CM

## 2023-06-12 PROCEDURE — 99213 OFFICE O/P EST LOW 20 MIN: CPT | Mod: 95

## 2023-06-12 NOTE — HISTORY OF PRESENT ILLNESS
[Lower back] : lower back [Dull/Aching] : dull/aching [Shooting] : shooting [Constant] : constant [Rest] : rest [Walking] : walking [Disabled] : Work status: disabled [10] : 10 [5] : 5 [Home] : at home, [unfilled] , at the time of the visit. [Medical Office: ([facility])___] : at the medical office located in  [Verbal consent obtained from patient] : the patient, [unfilled] [] : Post Surgical Visit: no [FreeTextEntry7] : RT SIDE GREATER THAN LT SIDE  [de-identified] : PT HAS NOT COMPLETED PTA \par HOME STRETCHING I SO COMPLETED 7X A WEEK WITH NO IMPROVMENT IN PAIN

## 2023-06-14 ENCOUNTER — TRANSCRIPTION ENCOUNTER (OUTPATIENT)
Age: 83
End: 2023-06-14

## 2023-06-14 NOTE — ASU PATIENT PROFILE, ADULT - NSICDXPASTMEDICALHX_GEN_ALL_CORE_FT
PAST MEDICAL HISTORY:  Arthritis     Asthma     COPD (chronic obstructive pulmonary disease)     GERD (gastroesophageal reflux disease)     Lumbar radiculopathy     Migraines     Osteoarthritis      PAST MEDICAL HISTORY:  Arthritis scol      Asthma     GERD (gastroesophageal reflux disease)     H/O scoliosis     Hypercholesteremia     Lumbar radiculopathy     Migraines     Osteoarthritis

## 2023-06-14 NOTE — ASU DISCHARGE PLAN (ADULT/PEDIATRIC) - NS MD DC FALL RISK RISK
For information on Fall & Injury Prevention, visit: [email or web address] OR  [email or web address] OR  https://www.cdc.gov/steadi/patient.html

## 2023-06-14 NOTE — ASU PATIENT PROFILE, ADULT - NSICDXPASTSURGICALHX_GEN_ALL_CORE_FT
PAST SURGICAL HISTORY:  History of back surgery     History of hernia surgery      PAST SURGICAL HISTORY:  Bilateral absence of tonsils     History of back surgery lumbar disc    History of hernia surgery

## 2023-06-15 ENCOUNTER — OUTPATIENT (OUTPATIENT)
Dept: OUTPATIENT SERVICES | Facility: HOSPITAL | Age: 83
LOS: 1 days | End: 2023-06-15
Payer: MEDICARE

## 2023-06-15 ENCOUNTER — APPOINTMENT (OUTPATIENT)
Dept: ORTHOPEDIC SURGERY | Facility: HOSPITAL | Age: 83
End: 2023-06-15
Payer: MEDICARE

## 2023-06-15 VITALS
SYSTOLIC BLOOD PRESSURE: 144 MMHG | DIASTOLIC BLOOD PRESSURE: 72 MMHG | HEART RATE: 78 BPM | TEMPERATURE: 98 F | OXYGEN SATURATION: 99 % | RESPIRATION RATE: 16 BRPM

## 2023-06-15 VITALS
SYSTOLIC BLOOD PRESSURE: 138 MMHG | RESPIRATION RATE: 16 BRPM | WEIGHT: 100.09 LBS | TEMPERATURE: 98 F | OXYGEN SATURATION: 98 % | HEART RATE: 66 BPM | HEIGHT: 60 IN | DIASTOLIC BLOOD PRESSURE: 97 MMHG

## 2023-06-15 DIAGNOSIS — Z98.890 OTHER SPECIFIED POSTPROCEDURAL STATES: Chronic | ICD-10-CM

## 2023-06-15 DIAGNOSIS — M54.16 RADICULOPATHY, LUMBAR REGION: ICD-10-CM

## 2023-06-15 DIAGNOSIS — Z90.89 ACQUIRED ABSENCE OF OTHER ORGANS: Chronic | ICD-10-CM

## 2023-06-15 PROCEDURE — 62323 NJX INTERLAMINAR LMBR/SAC: CPT

## 2023-06-15 RX ORDER — HYDROXYCHLOROQUINE SULFATE 200 MG
1 TABLET ORAL
Refills: 0 | DISCHARGE

## 2023-06-15 RX ORDER — DICLOFENAC SODIUM 30 MG/G
2 GEL TOPICAL
Refills: 0 | DISCHARGE

## 2023-06-15 RX ORDER — ATORVASTATIN CALCIUM 80 MG/1
1 TABLET, FILM COATED ORAL
Refills: 0 | DISCHARGE

## 2023-06-15 RX ORDER — CHOLECALCIFEROL (VITAMIN D3) 125 MCG
1 CAPSULE ORAL
Refills: 0 | DISCHARGE

## 2023-06-15 RX ORDER — ATORVASTATIN CALCIUM 80 MG/1
1 TABLET, FILM COATED ORAL
Qty: 0 | Refills: 0 | DISCHARGE

## 2023-07-19 ENCOUNTER — LABORATORY RESULT (OUTPATIENT)
Age: 83
End: 2023-07-19

## 2023-07-20 ENCOUNTER — APPOINTMENT (OUTPATIENT)
Dept: RHEUMATOLOGY | Facility: CLINIC | Age: 83
End: 2023-07-20
Payer: MEDICARE

## 2023-07-20 VITALS
HEART RATE: 95 BPM | BODY MASS INDEX: 18.88 KG/M2 | HEIGHT: 61 IN | WEIGHT: 100 LBS | DIASTOLIC BLOOD PRESSURE: 49 MMHG | OXYGEN SATURATION: 92 % | SYSTOLIC BLOOD PRESSURE: 84 MMHG | TEMPERATURE: 97.3 F

## 2023-07-20 VITALS — SYSTOLIC BLOOD PRESSURE: 96 MMHG | DIASTOLIC BLOOD PRESSURE: 68 MMHG

## 2023-07-20 DIAGNOSIS — M41.9 SCOLIOSIS, UNSPECIFIED: ICD-10-CM

## 2023-07-20 DIAGNOSIS — M81.0 AGE-RELATED OSTEOPOROSIS W/OUT CURRENT PATHOLOGICAL FRACTURE: ICD-10-CM

## 2023-07-20 PROBLEM — M19.90 UNSPECIFIED OSTEOARTHRITIS, UNSPECIFIED SITE: Chronic | Status: ACTIVE | Noted: 2023-06-14

## 2023-07-20 PROBLEM — Z87.39 PERSONAL HISTORY OF OTHER DISEASES OF THE MUSCULOSKELETAL SYSTEM AND CONNECTIVE TISSUE: Chronic | Status: ACTIVE | Noted: 2023-06-15

## 2023-07-20 PROBLEM — E78.00 PURE HYPERCHOLESTEROLEMIA, UNSPECIFIED: Chronic | Status: ACTIVE | Noted: 2023-06-15

## 2023-07-20 PROBLEM — M54.16 RADICULOPATHY, LUMBAR REGION: Chronic | Status: ACTIVE | Noted: 2023-06-14

## 2023-07-20 PROBLEM — M19.90 UNSPECIFIED OSTEOARTHRITIS, UNSPECIFIED SITE: Chronic | Status: ACTIVE | Noted: 2022-11-17

## 2023-07-20 PROBLEM — G43.909 MIGRAINE, UNSPECIFIED, NOT INTRACTABLE, WITHOUT STATUS MIGRAINOSUS: Chronic | Status: ACTIVE | Noted: 2023-06-14

## 2023-07-20 PROCEDURE — 99214 OFFICE O/P EST MOD 30 MIN: CPT

## 2023-07-20 NOTE — HISTORY OF PRESENT ILLNESS
[FreeTextEntry1] : 81 y/o woman with hx of GERD, CHF, COPD/asthmatic component, borderline elevated HTN, HLD on crestor, degenerative disc disease of spine, and on adderall for paralytic sleep and attention deficit, here for f/u of her inflammatory arthritis. I am treating her for seronegative RA/with +RNP/-GABBY. She is on plaquenil 200mg BID and was previously on rituxan. \par \par Shes in PT, just started and had 2 sessions. \par \par Tamar was also helpful, at least 50% improvement. She completed a series to b/L knees on 11/10/2022. \par Wanted with US guidance next time, so she saw an orthopedic.  He performed HA injections which she found were not as helpful as Tamar with me.  She still feels that she wants it under US guidance, so will go somewhere where they do it.  \par \par 6/2023 last eye exam, all good. She complains that she had to wait 4 hours in eye doctor's office.  \par \par She had been happy with the PLQ/Rituxan combination. Last Rituxan was 6/29/21\par \par \par Current pain is in knees and 1st MCPs. She also has pain in left lower back. \par November 21st she had an epidural with Dr. Momin. It helped and she'd like to have it done q 3 months if possible. \par \par She saw Dr. Nguyễn who told her not to take naproxen due to hx of erosive gastritis. \par \par She had CT chest 11/05/20 which shows bronchiectasis, few ill-defined opacities in peripheral aspect, scattered tree in bud opacities within lungs. \par \par No dental procedures planned. \par \par Cancer screening:\par Last mammogram 12/21/2020 negative\par EGD 1/19/18 and 3/1/23 Erosive gastritis\par Colonoscopy 3/1/2023: normal. \par \par \par

## 2023-07-20 NOTE — ASSESSMENT
[FreeTextEntry1] : \par \par \par 81 y/o woman with hx of COPD/asthma, HLD, paralytic sleep disorder, erosive gastritis, DJD/Levoscoliosis spine, presents for seronegative RA/+RNP wth negative GABBY (combination of inflammatory arthritis, raynaud's, fatigue, b/L nodular opacities seen in lung likely related). She is currently off prednisone. She is no longer on Rituxan due to covid climate, and wants to continue to remain off of it. Her RA is relatively stable. \par Her OA is improved. \par \par Plan:\par RA/+RNP: overall stable. \par -continue plaquenil 200mg BID for seronegative RA/+RNP. Her next eye appt is 6/1/23. \par -Off MTX and folic acid due to intolerance. \par -IM depomedrol given previously for ankle pains and right 1st MCP swelling\par -Did well on Rituxan. Last dose 6/29/21. Wanted to delay the next one as long as she can due to covid. She previously received Evusheld. \par -We discussed possibly moving to actra, however she has hx of diverticulosis, so will avoid if possible.  Patient also has hx of COPD, so will avoid orencia as well. \par -avoid NSAIDS due to erosive gastritis\par \par Pulmonary infiltrates\par -REcent CT chest ordered due to weight loss shows peripheral infiltrates. She had discussed possible bronchoscopy, and possible tx fo MAC. I've encouraged her to keep follow up with pulm regarding this. She has not returned, but states she plans on doing so now.  \par -She has hx of smoking, distant.\par -routine screening with PFTs and ECHO.\par -prev declined amlodipine for raynaud's. To continue to use conservative measures for hand warming. \par -f/u  Dr. Caputo\par \par back pain\par -finds the epidurals helpful\par - PT script given\par -difficult for her to take her tizanidine as it makes her sleepy\par \par Right shoulder limited ROM-\par -prevent adhesive capsulitis\par \par Left shoulder pain/impingement- not active. \par -Injected right SAB 3/6/20 with 80mg depomedrol. \par -80mg depomedrol administered to left SAB 12/4/18- didn't help too much\par \par \par Knee OA- stable\par -Injected left knee 3/6/20 with 80mg depomedrol after aspirating 8 cc of fluid from left knee. \par -She prefers having these done under image guidance. She will have these done with ortho. \par \par \par Vit D deficiency/clinical osteoporosis\par -Completed 50,000IU q week x 4 weeks. currently back on 2000IU daily. \par -DEXA shows osteopenia. With her tibia fracture, she has diagnosis of clinical osteoporosis. \par -prolia given 3/30/23, next due on or after 10/2/23\par \par \par rpa 3-4 months. Check Quant and Vit D today.  \par labs before next visit\par \par \par

## 2023-07-26 LAB
25(OH)D3 SERPL-MCNC: 52.3 NG/ML
ALBUMIN SERPL ELPH-MCNC: 3.8 G/DL
ALP BLD-CCNC: 92 U/L
ALT SERPL-CCNC: 41 U/L
ANION GAP SERPL CALC-SCNC: 14 MMOL/L
AST SERPL-CCNC: 48 U/L
BILIRUB SERPL-MCNC: 0.4 MG/DL
BUN SERPL-MCNC: 33 MG/DL
C3 SERPL-MCNC: 132 MG/DL
C4 SERPL-MCNC: 60 MG/DL
CALCIUM SERPL-MCNC: 9 MG/DL
CHLORIDE SERPL-SCNC: 105 MMOL/L
CO2 SERPL-SCNC: 22 MMOL/L
CREAT SERPL-MCNC: 0.97 MG/DL
CRP SERPL-MCNC: 23 MG/L
DSDNA AB SER-ACNC: <12 IU/ML
EGFR: 59 ML/MIN/1.73M2
ERYTHROCYTE [SEDIMENTATION RATE] IN BLOOD BY WESTERGREN METHOD: 3 MM/HR
GLUCOSE SERPL-MCNC: 89 MG/DL
M TB IFN-G BLD-IMP: NEGATIVE
POTASSIUM SERPL-SCNC: 4.3 MMOL/L
PROT SERPL-MCNC: 6.2 G/DL
QUANTIFERON TB PLUS MITOGEN MINUS NIL: 8.64 IU/ML
QUANTIFERON TB PLUS NIL: 0.23 IU/ML
QUANTIFERON TB PLUS TB1 MINUS NIL: 0.06 IU/ML
QUANTIFERON TB PLUS TB2 MINUS NIL: 0.09 IU/ML
SODIUM SERPL-SCNC: 141 MMOL/L

## 2023-08-04 RX ORDER — DENOSUMAB 60 MG/ML
60 INJECTION SUBCUTANEOUS
Qty: 1 | Refills: 0 | Status: ACTIVE | COMMUNITY
Start: 2023-08-04 | End: 1900-01-01

## 2023-08-17 DIAGNOSIS — M05.20 RHEUMATOID VASCULITIS WITH RHEUMATOID ARTHRITIS OF UNSPECIFIED SITE: ICD-10-CM

## 2023-09-20 ENCOUNTER — APPOINTMENT (OUTPATIENT)
Dept: RHEUMATOLOGY | Facility: CLINIC | Age: 83
End: 2023-09-20

## 2023-09-27 RX ORDER — DICLOFENAC SODIUM 1 %
1 KIT TOPICAL
Qty: 1 | Refills: 0 | Status: DISCONTINUED | COMMUNITY
Start: 2021-08-31 | End: 2023-09-27

## 2023-09-27 RX ORDER — SODIUM HYALURONATE INTRA-ARTICULAR SOLN PREF SYR 25 MG/2.5ML 25/2.5 MG/ML
25 SOLUTION PREFILLED SYRINGE INTRAARTICULAR
Qty: 10 | Refills: 0 | Status: DISCONTINUED | COMMUNITY
Start: 2022-10-11 | End: 2023-09-27

## 2023-09-27 RX ORDER — DENOSUMAB 60 MG/ML
60 INJECTION SUBCUTANEOUS
Qty: 1 | Refills: 0 | Status: DISCONTINUED | COMMUNITY
Start: 2022-08-02 | End: 2023-09-27

## 2023-09-27 RX ORDER — ATORVASTATIN CALCIUM 20 MG/1
20 TABLET, FILM COATED ORAL
Qty: 30 | Refills: 0 | Status: DISCONTINUED | COMMUNITY
Start: 2021-08-10 | End: 2023-09-27

## 2023-09-27 RX ORDER — DENOSUMAB 60 MG/ML
60 INJECTION SUBCUTANEOUS
Qty: 1 | Refills: 0 | Status: DISCONTINUED | COMMUNITY
Start: 2022-12-15 | End: 2023-09-27

## 2023-10-04 ENCOUNTER — APPOINTMENT (OUTPATIENT)
Dept: RHEUMATOLOGY | Facility: CLINIC | Age: 83
End: 2023-10-04

## 2023-10-06 NOTE — ASU PREOP CHECKLIST - ANTIBIOTIC
Care Transitions Program Weekly Follow-up Call    Current Issues/Problems reviewed on call: Patient states continued lightheadedness. Patient has Echo on 10/10. Patient unable to hear out of right ear.     Review of Systems:   Care Transition System Evaluation:    General Symptoms:  (lightheadedness)  Pain Location: denies  Respiratory Symptoms: Shortness of breath (at baseline)  Cardiovascular Symptoms: WDL (absence of symptoms)  GI Symptoms: WDL (absence of symptoms)   Symptoms: WDL (absence of symptoms)    Next Care Transitions follow-up call will be within 1 week.     Plan for next follow-up call: Routine Care Transitions assessment, needs identification and ongoing support.   
n/a

## 2023-10-12 ENCOUNTER — APPOINTMENT (OUTPATIENT)
Dept: RHEUMATOLOGY | Facility: CLINIC | Age: 83
End: 2023-10-12
Payer: MEDICARE

## 2023-10-12 VITALS
SYSTOLIC BLOOD PRESSURE: 142 MMHG | HEART RATE: 85 BPM | DIASTOLIC BLOOD PRESSURE: 82 MMHG | RESPIRATION RATE: 16 BRPM | OXYGEN SATURATION: 94 % | TEMPERATURE: 97.9 F

## 2023-10-12 PROCEDURE — 96372 THER/PROPH/DIAG INJ SC/IM: CPT

## 2023-10-12 RX ORDER — DENOSUMAB 60 MG/ML
60 INJECTION SUBCUTANEOUS
Qty: 1 | Refills: 0 | Status: COMPLETED | OUTPATIENT
Start: 2023-10-11

## 2023-11-16 ENCOUNTER — APPOINTMENT (OUTPATIENT)
Dept: RHEUMATOLOGY | Facility: CLINIC | Age: 83
End: 2023-11-16
Payer: MEDICARE

## 2023-11-16 VITALS
HEART RATE: 74 BPM | SYSTOLIC BLOOD PRESSURE: 110 MMHG | WEIGHT: 105 LBS | BODY MASS INDEX: 19.83 KG/M2 | TEMPERATURE: 97.4 F | OXYGEN SATURATION: 97 % | HEIGHT: 61 IN | DIASTOLIC BLOOD PRESSURE: 70 MMHG

## 2023-11-16 DIAGNOSIS — J98.8 OTHER SPECIFIED RESPIRATORY DISORDERS: ICD-10-CM

## 2023-11-16 PROCEDURE — 99214 OFFICE O/P EST MOD 30 MIN: CPT

## 2023-11-16 RX ORDER — HYALURONATE SODIUM 20 MG/2 ML
20 SYRINGE (ML) INTRAARTICULAR
Qty: 2 | Refills: 0 | Status: DISCONTINUED | OUTPATIENT
Start: 2023-04-04 | End: 2023-11-16

## 2023-11-16 RX ORDER — DEXTROAMPHETAMINE SACCHARATE, AMPHETAMINE ASPARTATE, DEXTROAMPHETAMINE SULFATE AND AMPHETAMINE SULFATE 3.75; 3.75; 3.75; 3.75 MG/1; MG/1; MG/1; MG/1
15 TABLET ORAL
Refills: 0 | Status: DISCONTINUED | COMMUNITY
End: 2023-11-16

## 2023-12-04 ENCOUNTER — NON-APPOINTMENT (OUTPATIENT)
Age: 83
End: 2023-12-04

## 2023-12-07 RX ORDER — HYDROXYCHLOROQUINE SULFATE 200 MG/1
200 TABLET, FILM COATED ORAL
Qty: 180 | Refills: 1 | Status: ACTIVE | COMMUNITY
Start: 2018-03-20 | End: 1900-01-01

## 2024-01-22 ENCOUNTER — APPOINTMENT (OUTPATIENT)
Dept: DERMATOLOGY | Facility: CLINIC | Age: 84
End: 2024-01-22
Payer: MEDICARE

## 2024-01-22 DIAGNOSIS — L82.1 OTHER SEBORRHEIC KERATOSIS: ICD-10-CM

## 2024-01-22 DIAGNOSIS — L81.4 OTHER MELANIN HYPERPIGMENTATION: ICD-10-CM

## 2024-01-22 PROCEDURE — 99213 OFFICE O/P EST LOW 20 MIN: CPT

## 2024-01-22 NOTE — HISTORY OF PRESENT ILLNESS
[FreeTextEntry1] : Patient presents for skin examination. [de-identified] : Denies new, changing, bleeding or tender lesions on the skin over the past year.

## 2024-01-30 ENCOUNTER — APPOINTMENT (OUTPATIENT)
Dept: RHEUMATOLOGY | Facility: CLINIC | Age: 84
End: 2024-01-30
Payer: MEDICARE

## 2024-01-30 VITALS
BODY MASS INDEX: 18.88 KG/M2 | SYSTOLIC BLOOD PRESSURE: 137 MMHG | HEIGHT: 61 IN | OXYGEN SATURATION: 97 % | HEART RATE: 94 BPM | TEMPERATURE: 97.3 F | DIASTOLIC BLOOD PRESSURE: 75 MMHG | WEIGHT: 100 LBS

## 2024-01-30 DIAGNOSIS — R76.8 OTHER SPECIFIED ABNORMAL IMMUNOLOGICAL FINDINGS IN SERUM: ICD-10-CM

## 2024-01-30 DIAGNOSIS — M17.0 BILATERAL PRIMARY OSTEOARTHRITIS OF KNEE: ICD-10-CM

## 2024-01-30 DIAGNOSIS — M06.00 RHEUMATOID ARTHRITIS W/OUT RHEUMATOID FACTOR, UNSPECIFIED SITE: ICD-10-CM

## 2024-01-30 DIAGNOSIS — M06.9 RHEUMATOID ARTHRITIS, UNSPECIFIED: ICD-10-CM

## 2024-01-30 PROCEDURE — 99214 OFFICE O/P EST MOD 30 MIN: CPT

## 2024-01-30 PROCEDURE — G2211 COMPLEX E/M VISIT ADD ON: CPT

## 2024-01-30 RX ORDER — TIZANIDINE HYDROCHLORIDE 2 MG/1
2 CAPSULE ORAL
Qty: 180 | Refills: 0 | Status: DISCONTINUED | COMMUNITY
Start: 2023-03-13 | End: 2024-01-30

## 2024-01-30 RX ORDER — AZITHROMYCIN 250 MG/1
250 TABLET, FILM COATED ORAL
Qty: 1 | Refills: 0 | Status: DISCONTINUED | COMMUNITY
Start: 2023-11-16 | End: 2024-01-30

## 2024-01-30 NOTE — ASSESSMENT
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 She is currently off prednisone. She is no longer on Rituxan due to covid climate, and wants to continue to remain off of it. Her RA is relatively stable.  Her OA is improved.    Plan:  RA/+RNP: overall stable, joints are doing well.    -continue plaquenil 200mg BID for seronegative RA/+RNP. Needs to make a new eye appt.  -Off MTX and folic acid due to intolerance. -IM depomedrol given previously for ankle pains and right 1st MCP swelling, responded well. -Did well on Rituxan. Last dose 6/29/21. Wanted to delay the next one as long as she can due to covid. She previously received Evusheld. Currently stating she would like to delay rituxan a little further. -We discussed possibly moving to actemra -avoid NSAIDS due to erosive gastritis  Pulmonary infiltrates -CT chest ordered due to weight loss shows peripheral infiltrates. She had discussed possible bronchoscopy, and possible tx fo MAC. I've encouraged her to keep follow ups with pulm regarding this. -She has hx of smoking, distant. -routine screening with PFTs and ECHO.  -prev declined amlodipine for raynaud's. To continue to use conservative measures for hand warming. -f/u Dr. Walls and Dr. Caputo    back pain -finds the epidurals helpful - PT script given -difficult for her to take her tizanidine as it makes her sleepy    Right shoulder limited ROM- not an active issue today -exercises to prevent adhesive capsulitis   Left shoulder pain/impingement- not active. -Injected right SAB 3/6/20 with 80mg depomedrol. -80mg depomedrol administered to left SAB 12/4/18- didn't help too much      Knee OA- stable -Injected left knee 3/6/20 with 80mg depomedrol after aspirating 8 cc of fluid from left knee. -She prefers having these done under image guidance. She will have these done with ortho.    Vit D deficiency/clinical osteoporosis -Completed 50,000IU q week x 4 weeks. currently back on 2000IU daily. DEXA 11/3/23 shows T score -2.2. Compression fx L1 noted, f/u XR did not see any fracture. -DEXA 2020 shows osteopenia. With her tibia fracture, she has diagnosis of clinical osteoporosis. -prolia given 10/12/23 Next scheduled 4/18/24.  -Labs reviewed from 12/2022.  To repeat CMP and have Vit D done 3/2024.       rpa 6 months  labs before next visit.

## 2024-01-30 NOTE — PHYSICAL EXAM
[TextEntry] : Constitutional: Alert and in no acute distress. Eyes: Sclera and conjunctiva were normal, pupils were equal in size, round, and reactive. Extraocular movements were in tact. ENT: Ears, and nose were normal in appearance. No oral sores. No thrush.  Neck: The appearance of the neck was normal, no neck mass was observed, the thyroid was not enlarged and there were no palpable thyroid nodules. Pulmonary: crackles b/L Cardiac: Heart rate was normal and rhythm regular, normal S1 and S2, no gallops, no murmurs, no pericardial rub. Vascular: The pedal pulses are present. There was no peripheral edema. No color changes in the digits. Abd: Normal bowel sounds. Soft, non-tender, non distended, no hepato-splenomegaly. Musculoskeletal: Normal gait, no clubbing or cyanosis of the fingernails, and muscle strength and tone were normal. Crepitus b/L knees, no knee effusion. Left knee able to flex/extend without difficulty. Decreased ROM right shoulder. Noted significant scoliosis. Right hip int/ext rotation pain Hands no active synovitis

## 2024-01-30 NOTE — HISTORY OF PRESENT ILLNESS
[FreeTextEntry1] : 82 y/o woman with hx of GERD, CHF, COPD/asthmatic component, borderline elevated HTN, HLD on crestor, degenerative disc disease of spine, and on adderall for paralytic sleep and attention deficit, left knee fracture, here for f/u of her inflammatory arthritis. I am treating her for seronegative RA/with +RNP/-GABBY. She is on plaquenil 200mg BID and was previously on rituxan.  She had been happy with the PLQ/Rituxan combination. Last Rituxan was 6/29/21 6/2023 last eye exam, all good. Will make another appt as she missed her 12/2023 appt.    When she lays on her right side at nighit, there is pain in her outer hip area.  When she goes flat on her back, it goes away.   Has done PT She previously had epidural with Dr. Momin, but hasn't had one in a while.    Supartz was also helpful, at least 50% improvement. She completed a series to b/L knees on 11/10/2022. She went to dr. Downey,but would like to see another orthopedic to give her her HA injections. Wanted with US guidance next time, so she saw an orthopedic. He performed HA injections which she found were not as helpful as Tamar with me. She still feels that she wants it under US guidance, so will go somewhere where they do it.   She has had pain in left lower back. Epidural with Dr. Momin done previously. It helped and she'd like to have it done q 3 months if possible  She saw Dr. Nguyễn who told her not to take naproxen due to hx of erosive gastritis  She had CT chest 11/05/20 which shows bronchiectasis, few ill-defined opacities in peripheral aspect, scattered tree in bud opacities within lungs. She hasn't f/u with pulm, but was given inhaler by her cardiologist.    No dental procedures planned. No new fractures  Cancer screening:  Last mammogram 12/21/2020 negative EGD 1/19/18 and 3/1/23 Erosive gastritis Colonoscopy 3/1/2023: normal.

## 2024-02-12 ENCOUNTER — EMERGENCY (EMERGENCY)
Facility: HOSPITAL | Age: 84
LOS: 0 days | Discharge: LEFT BEFORE DISCHARGE | End: 2024-02-12
Attending: STUDENT IN AN ORGANIZED HEALTH CARE EDUCATION/TRAINING PROGRAM
Payer: MEDICARE

## 2024-02-12 VITALS
HEART RATE: 83 BPM | RESPIRATION RATE: 17 BRPM | OXYGEN SATURATION: 97 % | SYSTOLIC BLOOD PRESSURE: 142 MMHG | DIASTOLIC BLOOD PRESSURE: 77 MMHG | TEMPERATURE: 98 F

## 2024-02-12 VITALS — WEIGHT: 100.09 LBS | HEIGHT: 55 IN

## 2024-02-12 DIAGNOSIS — R07.89 OTHER CHEST PAIN: ICD-10-CM

## 2024-02-12 DIAGNOSIS — J44.9 CHRONIC OBSTRUCTIVE PULMONARY DISEASE, UNSPECIFIED: ICD-10-CM

## 2024-02-12 DIAGNOSIS — Z88.1 ALLERGY STATUS TO OTHER ANTIBIOTIC AGENTS STATUS: ICD-10-CM

## 2024-02-12 DIAGNOSIS — Z53.29 PROCEDURE AND TREATMENT NOT CARRIED OUT BECAUSE OF PATIENT'S DECISION FOR OTHER REASONS: ICD-10-CM

## 2024-02-12 DIAGNOSIS — Z88.0 ALLERGY STATUS TO PENICILLIN: ICD-10-CM

## 2024-02-12 DIAGNOSIS — Z98.890 OTHER SPECIFIED POSTPROCEDURAL STATES: Chronic | ICD-10-CM

## 2024-02-12 DIAGNOSIS — K21.9 GASTRO-ESOPHAGEAL REFLUX DISEASE WITHOUT ESOPHAGITIS: ICD-10-CM

## 2024-02-12 DIAGNOSIS — M19.90 UNSPECIFIED OSTEOARTHRITIS, UNSPECIFIED SITE: ICD-10-CM

## 2024-02-12 DIAGNOSIS — E78.5 HYPERLIPIDEMIA, UNSPECIFIED: ICD-10-CM

## 2024-02-12 DIAGNOSIS — Z90.89 ACQUIRED ABSENCE OF OTHER ORGANS: Chronic | ICD-10-CM

## 2024-02-12 LAB
ALBUMIN SERPL ELPH-MCNC: 3.7 G/DL — SIGNIFICANT CHANGE UP (ref 3.3–5)
ALP SERPL-CCNC: 75 U/L — SIGNIFICANT CHANGE UP (ref 40–120)
ALT FLD-CCNC: 22 U/L — SIGNIFICANT CHANGE UP (ref 12–78)
ANION GAP SERPL CALC-SCNC: 4 MMOL/L — LOW (ref 5–17)
AST SERPL-CCNC: 24 U/L — SIGNIFICANT CHANGE UP (ref 15–37)
BASOPHILS # BLD AUTO: 0.09 K/UL — SIGNIFICANT CHANGE UP (ref 0–0.2)
BASOPHILS NFR BLD AUTO: 1 % — SIGNIFICANT CHANGE UP (ref 0–2)
BILIRUB SERPL-MCNC: 0.4 MG/DL — SIGNIFICANT CHANGE UP (ref 0.2–1.2)
BUN SERPL-MCNC: 24 MG/DL — HIGH (ref 7–23)
CALCIUM SERPL-MCNC: 9.1 MG/DL — SIGNIFICANT CHANGE UP (ref 8.5–10.1)
CHLORIDE SERPL-SCNC: 110 MMOL/L — HIGH (ref 96–108)
CO2 SERPL-SCNC: 27 MMOL/L — SIGNIFICANT CHANGE UP (ref 22–31)
CREAT SERPL-MCNC: 0.91 MG/DL — SIGNIFICANT CHANGE UP (ref 0.5–1.3)
EGFR: 63 ML/MIN/1.73M2 — SIGNIFICANT CHANGE UP
EOSINOPHIL # BLD AUTO: 0.26 K/UL — SIGNIFICANT CHANGE UP (ref 0–0.5)
EOSINOPHIL NFR BLD AUTO: 2.9 % — SIGNIFICANT CHANGE UP (ref 0–6)
GLUCOSE SERPL-MCNC: 92 MG/DL — SIGNIFICANT CHANGE UP (ref 70–99)
HCT VFR BLD CALC: 41 % — SIGNIFICANT CHANGE UP (ref 34.5–45)
HGB BLD-MCNC: 13.1 G/DL — SIGNIFICANT CHANGE UP (ref 11.5–15.5)
IMM GRANULOCYTES NFR BLD AUTO: 0.2 % — SIGNIFICANT CHANGE UP (ref 0–0.9)
LYMPHOCYTES # BLD AUTO: 1.71 K/UL — SIGNIFICANT CHANGE UP (ref 1–3.3)
LYMPHOCYTES # BLD AUTO: 19.3 % — SIGNIFICANT CHANGE UP (ref 13–44)
MAGNESIUM SERPL-MCNC: 2.2 MG/DL — SIGNIFICANT CHANGE UP (ref 1.6–2.6)
MCHC RBC-ENTMCNC: 30.5 PG — SIGNIFICANT CHANGE UP (ref 27–34)
MCHC RBC-ENTMCNC: 32 GM/DL — SIGNIFICANT CHANGE UP (ref 32–36)
MCV RBC AUTO: 95.3 FL — SIGNIFICANT CHANGE UP (ref 80–100)
MONOCYTES # BLD AUTO: 0.47 K/UL — SIGNIFICANT CHANGE UP (ref 0–0.9)
MONOCYTES NFR BLD AUTO: 5.3 % — SIGNIFICANT CHANGE UP (ref 2–14)
NEUTROPHILS # BLD AUTO: 6.32 K/UL — SIGNIFICANT CHANGE UP (ref 1.8–7.4)
NEUTROPHILS NFR BLD AUTO: 71.3 % — SIGNIFICANT CHANGE UP (ref 43–77)
PLATELET # BLD AUTO: 229 K/UL — SIGNIFICANT CHANGE UP (ref 150–400)
POTASSIUM SERPL-MCNC: 4.1 MMOL/L — SIGNIFICANT CHANGE UP (ref 3.5–5.3)
POTASSIUM SERPL-SCNC: 4.1 MMOL/L — SIGNIFICANT CHANGE UP (ref 3.5–5.3)
PROT SERPL-MCNC: 7.6 GM/DL — SIGNIFICANT CHANGE UP (ref 6–8.3)
RBC # BLD: 4.3 M/UL — SIGNIFICANT CHANGE UP (ref 3.8–5.2)
RBC # FLD: 13.8 % — SIGNIFICANT CHANGE UP (ref 10.3–14.5)
SODIUM SERPL-SCNC: 141 MMOL/L — SIGNIFICANT CHANGE UP (ref 135–145)
TROPONIN I, HIGH SENSITIVITY RESULT: 5.93 NG/L — SIGNIFICANT CHANGE UP
WBC # BLD: 8.87 K/UL — SIGNIFICANT CHANGE UP (ref 3.8–10.5)
WBC # FLD AUTO: 8.87 K/UL — SIGNIFICANT CHANGE UP (ref 3.8–10.5)

## 2024-02-12 PROCEDURE — 99285 EMERGENCY DEPT VISIT HI MDM: CPT | Mod: FS

## 2024-02-12 PROCEDURE — 84484 ASSAY OF TROPONIN QUANT: CPT

## 2024-02-12 PROCEDURE — 93010 ELECTROCARDIOGRAM REPORT: CPT

## 2024-02-12 PROCEDURE — 83735 ASSAY OF MAGNESIUM: CPT

## 2024-02-12 PROCEDURE — 36415 COLL VENOUS BLD VENIPUNCTURE: CPT

## 2024-02-12 PROCEDURE — 71045 X-RAY EXAM CHEST 1 VIEW: CPT

## 2024-02-12 PROCEDURE — 93005 ELECTROCARDIOGRAM TRACING: CPT

## 2024-02-12 PROCEDURE — 80053 COMPREHEN METABOLIC PANEL: CPT

## 2024-02-12 PROCEDURE — 99285 EMERGENCY DEPT VISIT HI MDM: CPT | Mod: 25

## 2024-02-12 PROCEDURE — 85025 COMPLETE CBC W/AUTO DIFF WBC: CPT

## 2024-02-12 PROCEDURE — 71045 X-RAY EXAM CHEST 1 VIEW: CPT | Mod: 26

## 2024-02-12 NOTE — ED STATDOCS - PROGRESS NOTE DETAILS
Spoke with Dr Caputo. Recommends pt to stay but informed him that pt has animals and would not like to stay. States she has a scheduled cath for Wednesday but can get the cath for her tomorrow. Discussed the paln with the pt. Pt would still like to go home.   Had an at length discussion with patient.  Patient wishes to leave at this time.  The patient understands that they are leaving against medical advice despite the risk of missing a potentially serious diagnosis which may lead to injury, disability and/or death.  I discussed with the patient which tests would need to be performed and what type of monitoring would be necessary for the patient as well.  I was unable to convince patient to stay for further workup.  The patient was given an opportunity to ask any questions as well.   The patient demonstrates understanding of all risks, is awake and alert and demonstrates competence to make medical decisions.  The patient understands that they may return at any time if desired. Discussed the importance of close, prompt medical follow-up. -Castro Quinones PA-C Labs unremarkable. Spoke with Dr Caputo. Recommends pt to stay but informed him that pt has animals and would not like to stay. States she has a scheduled cath for Wednesday but can get the cath for her tomorrow. Discussed the paln with the pt. Pt would still like to go home.   Had an at length discussion with patient.  Patient wishes to leave at this time.  The patient understands that they are leaving against medical advice despite the risk of missing a potentially serious diagnosis which may lead to injury, disability and/or death.  I discussed with the patient which tests would need to be performed and what type of monitoring would be necessary for the patient as well.  I was unable to convince patient to stay for further workup.  The patient was given an opportunity to ask any questions as well.   The patient demonstrates understanding of all risks, is awake and alert and demonstrates competence to make medical decisions.  The patient understands that they may return at any time if desired. Discussed the importance of close, prompt medical follow-up. -Castro Quinones PA-C

## 2024-02-12 NOTE — ED STATDOCS - NSICDXPASTSURGICALHX_GEN_ALL_CORE_FT
PAST SURGICAL HISTORY:  Bilateral absence of tonsils     History of back surgery lumbar disc    History of hernia surgery

## 2024-02-12 NOTE — ED STATDOCS - OBJECTIVE STATEMENT
84 yo female with a PMH of hld, OA, copd/asthma, gerd presents with L sided chest pain since last night. Pt states it feels like spasms and started when she went to lay down. Intermittent, non radiating. Pt has followed up with Dr. Caputo and had a cardiac workup performed less than a year ago.   Denies abd pain, n/v, fever, cough. Pt with chronic SOB and lower extremity (that she has worked up) changed.   PMD= crystalelmi  Cardio- sally 84 yo female with a PMH of hld, OA, copd/asthma, gerd presents with L sided chest pain since last night. Pt states it feels like spasms and started when she went to lay down. Intermittent, non radiating. Pt has followed up with Dr. Caputo and had a cardiac workup performed less than a year ago.   Denies abd pain, n/v, fever, cough. Pt with chronic SOB and lower extremity (that she has worked up) changed. -Castro Quinones PA-C   PMD= jake  Cardio- sally

## 2024-02-12 NOTE — ED STATDOCS - PATIENT PORTAL LINK FT
You can access the FollowMyHealth Patient Portal offered by [facility] by registering at the following website: [email or web address] By joining [email or web address] FollowMyHealth portal, you will also be able to view your health information using other applications (apps) compatible with our system.

## 2024-02-12 NOTE — ED ADULT TRIAGE NOTE - CHIEF COMPLAINT QUOTE
pt presents to ED for chest pain since last night. states she has known blockage of L bundle branch and due for cardiac cath on Wednesday. referred to ED by Dr. Caputo. will obtain ekg.

## 2024-02-12 NOTE — ED STATDOCS - NSICDXPASTMEDICALHX_GEN_ALL_CORE_FT
PAST MEDICAL HISTORY:  Arthritis scol      Asthma     GERD (gastroesophageal reflux disease)     H/O scoliosis     Hypercholesteremia     Lumbar radiculopathy     Migraines     Osteoarthritis

## 2024-02-12 NOTE — ED STATDOCS - CLINICAL SUMMARY MEDICAL DECISION MAKING FREE TEXT BOX
84 yo female presents with chest pain since last night, intermittent, and non radiating.   EKG unremarkable. Will check labs, cxr, and consult cardio. -Castro Quinones PA-C

## 2024-02-12 NOTE — ED STATDOCS - NSFOLLOWUPINSTRUCTIONS_ED_ALL_ED_FT
Follow up for your catherization on Wednesday.   Call the office or wait for the nurse to call you about the information.   Return to the Emergency Department for worsening or persistent symptoms, and/or ANY NEW OR CONCERNING SYMPTOMS. If you have issues obtaining follow up, please call: [phone] or [phone]  to obtain a doctor or specialist who takes your insurance in your area.       Chest Pain    WHAT YOU NEED TO KNOW:    Chest pain can be caused by a range of conditions, from not serious to life-threatening. Chest pain can be a symptom of a digestive problem, such as acid reflux or a stomach ulcer. An anxiety attack or a strong emotion, such as anger, can also cause chest pain. Infection, inflammation, or a fracture in the bones or cartilage in your chest can cause pain or discomfort. Sometimes chest pain or pressure is caused by poor blood flow to your heart (angina). Chest pain may also be caused by life-threatening conditions such as a heart attack or blood clot in your lungs.     DISCHARGE INSTRUCTIONS:    Call 911 if:     You have any of the following signs of a heart attack:   Squeezing, pressure, or pain in your chest       and any of the following:   Discomfort or pain in your back, neck, jaw, stomach, or arm       Shortness of breath      Nausea or vomiting      Lightheadedness or a sudden cold sweat        Return to the emergency department if:     You have chest discomfort that gets worse, even with medicine.      You cough or vomit blood.       Your bowel movements are black or bloody.       You cannot stop vomiting, or it hurts to swallow.     Contact your healthcare provider if:     You have questions or concerns about your condition or care.        Medicines:     Medicines may be given to treat the cause of your chest pain. Examples include pain medicine, anxiety medicine, or medicines to increase blood flow to your heart.       Do not take certain medicines without asking your healthcare provider first. These include NSAIDs, herbal or vitamin supplements, or hormones (estrogen or progestin).       Take your medicine as directed. Contact your healthcare provider if you think your medicine is not helping or if you have side effects. Tell him or her if you are allergic to any medicine. Keep a list of the medicines, vitamins, and herbs you take. Include the amounts, and when and why you take them. Bring the list or the pill bottles to follow-up visits. Carry your medicine list with you in case of an emergency.    Follow up with your healthcare provider within 72 hours, or as directed: You may need to return for more tests to find the cause of your chest pain. You may be referred to a specialist, such as a cardiologist or gastroenterologist. Write down your questions so you remember to ask them during your visits.     Healthy living tips: The following are general healthy guidelines. If your chest pain is caused by a heart problem, your healthcare provider will give you specific guidelines to follow.    Do not smoke. Nicotine and other chemicals in cigarettes and cigars can cause lung and heart damage. Ask your healthcare provider for information if you currently smoke and need help to quit. E-cigarettes or smokeless tobacco still contain nicotine. Talk to your healthcare provider before you use these products.       Eat a variety of healthy, low-fat, low-salt foods. Healthy foods include fruits, vegetables, whole-grain breads, low-fat dairy products, beans, lean meats, and fish. Ask for more information about a heart healthy diet.      Drink plenty of water every day. Your body is made of mostly water. Water helps your body to control your temperature and blood pressure. Ask your healthcare provider how much water you should drink every day.      Ask about activity. Your healthcare provider will tell you which activities to limit or avoid. Ask when you can drive, return to work, and have sex. Ask about the best exercise plan for you.      Maintain a healthy weight. Ask your healthcare provider how much you should weigh. Ask him or her to help you create a weight loss plan if you are overweight.       Get the flu and pneumonia vaccines. All adults should get the influenza (flu) vaccine. Get it every year as soon as it becomes available. The pneumococcal vaccine is given to adults aged 65 years or older. The vaccine is given every 5 years to prevent pneumococcal disease, such as pneumonia.    If you have a stent:     Carry your stent card with you at all times.       Let all healthcare providers know that you have a stent.

## 2024-02-13 NOTE — H&P ADULT - NSHPLABSRESULTS_GEN_ALL_CORE
PET Myocardial perfusion revealed no evidence of myocardial infarction or ischemia. Transthoracic echo revealed mild mitral regurgitation EF between 50-55%

## 2024-02-13 NOTE — H&P ADULT - NSHPREVIEWOFSYSTEMS_GEN_ALL_CORE
CONSTITUTIONAL: No fever, weight loss, or fatigue  EYES: No eye pain, visual disturbances, or discharge  ENMT:  No difficulty hearing, tinnitus, vertigo; No sinus or throat pain  NECK: No pain or stiffness  BREASTS: No pain, masses, or nipple discharge  RESPIRATORY: + shortness of breath  CARDIOVASCULAR: + chest pain  GASTROINTESTINAL: No abdominal or epigastric pain. No nausea, vomiting, or hematemesis; No diarrhea or constipation. No melena or hematochezia.  GENITOURINARY: No dysuria, frequency, hematuria, or incontinence  NEUROLOGICAL: No headaches, memory loss, loss of strength, numbness, or tremors  SKIN: No itching, burning, rashes, or lesions   ENDOCRINE: No heat or cold intolerance; No hair loss  MUSCULOSKELETAL: No joint pain or swelling; No muscle, back, or extremity pain  PSYCHIATRIC: No depression, anxiety, mood swings, or difficulty sleeping

## 2024-02-13 NOTE — H&P ADULT - ASSESSMENT
83yr old female PMH HTN, aortic stenosis, HLD, PVC's carotid atherosclerosis with c/o sharp spasm in her chest at rest which then goes away. Pt. reports that this does not occur with any increased activity. Pt. had a PET Myocardial perfusion which revealed no evidence of myocardial infarction or ischemia. Transthoracic echo revealed mild mitral regurgitation EF between 50-55%  83yr old female PMH HTN, aortic stenosis, HLD, PVC's carotid atherosclerosis with c/o sharp spasm in her chest at rest which then goes away. Pt. reports that this does not occur with any increased activity. Pt. had a PET Myocardial perfusion which revealed no evidence of myocardial infarction or ischemia. Transthoracic echo revealed mild mitral regurgitation EF between 50-55% .  Pt referred for LHC/RHC for further evaluation.  Consent obtained for and signed for cardiac cath with coronary angiogram and possible stent placement with possible sedation and analgesia. C risks, benefits and alternatives discussed with patient. Risk discussed included, but not limited to MI, stroke, mortality, major bleeding, arrythmia, or infection, educational material provided. Pt. verbalizes and understands pre-procedural instructions.   ASA: II  Bleeding Risk Score: 2.6  Creatinine: 0.91  GFR:  63  Bobby Score:  5 points  Pt. was pre-hydrated with sodium chloride 0.9% 250cc bolus IV x1

## 2024-02-13 NOTE — H&P ADULT - HISTORY OF PRESENT ILLNESS
83yr old female PMH HTN, aortic stenosis, HLD, PVC's carotid atherosclerosis with c/o sharp spasm in her chest at rest which then goes away. Pt. reports that this does not occur with any increased activity. Pt. had a PET Myocardial perfusion which revealed no evidence of myocardial infarction or ischemia. Transthoracic echo revealed mild mitral regurgitation EF between 50-55% 83yr old female PMH HTN, aortic stenosis, HLD, PVC's carotid atherosclerosis with c/o sharp spasm in her chest at rest which then goes away. Pt. reports that this does not occur with any increased activity. Pt. had a PET Myocardial perfusion which revealed no evidence of myocardial infarction or ischemia. Transthoracic echo revealed mild mitral regurgitation EF between 50-55%. Pt referred for LHC/RHC for further evaluation.

## 2024-02-14 ENCOUNTER — OUTPATIENT (OUTPATIENT)
Dept: OUTPATIENT SERVICES | Facility: HOSPITAL | Age: 84
LOS: 1 days | Discharge: HOME | End: 2024-02-14
Payer: MEDICARE

## 2024-02-14 ENCOUNTER — TRANSCRIPTION ENCOUNTER (OUTPATIENT)
Age: 84
End: 2024-02-14

## 2024-02-14 VITALS
WEIGHT: 100.09 LBS | OXYGEN SATURATION: 97 % | HEIGHT: 60 IN | TEMPERATURE: 98 F | HEART RATE: 88 BPM | SYSTOLIC BLOOD PRESSURE: 145 MMHG | DIASTOLIC BLOOD PRESSURE: 94 MMHG | RESPIRATION RATE: 16 BRPM

## 2024-02-14 VITALS
RESPIRATION RATE: 16 BRPM | OXYGEN SATURATION: 97 % | SYSTOLIC BLOOD PRESSURE: 154 MMHG | HEART RATE: 75 BPM | DIASTOLIC BLOOD PRESSURE: 89 MMHG

## 2024-02-14 DIAGNOSIS — Z98.890 OTHER SPECIFIED POSTPROCEDURAL STATES: Chronic | ICD-10-CM

## 2024-02-14 DIAGNOSIS — Z90.89 ACQUIRED ABSENCE OF OTHER ORGANS: Chronic | ICD-10-CM

## 2024-02-14 DIAGNOSIS — R07.9 CHEST PAIN, UNSPECIFIED: ICD-10-CM

## 2024-02-14 PROCEDURE — C1725: CPT

## 2024-02-14 PROCEDURE — 93010 ELECTROCARDIOGRAM REPORT: CPT | Mod: 76

## 2024-02-14 PROCEDURE — C1874: CPT

## 2024-02-14 PROCEDURE — 92978 ENDOLUMINL IVUS OCT C 1ST: CPT | Mod: 26,LD

## 2024-02-14 PROCEDURE — 93460 R&L HRT ART/VENTRICLE ANGIO: CPT | Mod: 59

## 2024-02-14 PROCEDURE — C1894: CPT

## 2024-02-14 PROCEDURE — 92978 ENDOLUMINL IVUS OCT C 1ST: CPT | Mod: LD

## 2024-02-14 PROCEDURE — 93005 ELECTROCARDIOGRAM TRACING: CPT

## 2024-02-14 PROCEDURE — 99152 MOD SED SAME PHYS/QHP 5/>YRS: CPT

## 2024-02-14 PROCEDURE — 92928 PRQ TCAT PLMT NTRAC ST 1 LES: CPT | Mod: LD

## 2024-02-14 PROCEDURE — C9600: CPT | Mod: LD

## 2024-02-14 PROCEDURE — 93460 R&L HRT ART/VENTRICLE ANGIO: CPT | Mod: 26,59

## 2024-02-14 PROCEDURE — C1753: CPT

## 2024-02-14 PROCEDURE — C1887: CPT

## 2024-02-14 PROCEDURE — C1769: CPT

## 2024-02-14 RX ORDER — SODIUM CHLORIDE 9 MG/ML
1000 INJECTION INTRAMUSCULAR; INTRAVENOUS; SUBCUTANEOUS
Refills: 0 | Status: DISCONTINUED | OUTPATIENT
Start: 2024-02-14 | End: 2024-02-14

## 2024-02-14 RX ORDER — ASPIRIN/CALCIUM CARB/MAGNESIUM 324 MG
1 TABLET ORAL
Qty: 30 | Refills: 4
Start: 2024-02-14 | End: 2024-07-12

## 2024-02-14 RX ORDER — ATORVASTATIN CALCIUM 80 MG/1
1 TABLET, FILM COATED ORAL
Refills: 0 | DISCHARGE

## 2024-02-14 RX ORDER — HYDROXYCHLOROQUINE SULFATE 200 MG
1 TABLET ORAL
Qty: 0 | Refills: 0 | DISCHARGE

## 2024-02-14 RX ORDER — SACUBITRIL AND VALSARTAN 24; 26 MG/1; MG/1
1 TABLET, FILM COATED ORAL
Qty: 0 | Refills: 0 | DISCHARGE

## 2024-02-14 RX ORDER — SUMATRIPTAN SUCCINATE 4 MG/.5ML
1 INJECTION, SOLUTION SUBCUTANEOUS
Refills: 0 | DISCHARGE

## 2024-02-14 RX ORDER — MELOXICAM 15 MG/1
1 TABLET ORAL
Refills: 0 | DISCHARGE

## 2024-02-14 RX ORDER — SODIUM CHLORIDE 9 MG/ML
250 INJECTION INTRAMUSCULAR; INTRAVENOUS; SUBCUTANEOUS ONCE
Refills: 0 | Status: COMPLETED | OUTPATIENT
Start: 2024-02-14 | End: 2024-02-14

## 2024-02-14 RX ORDER — CLOPIDOGREL BISULFATE 75 MG/1
1 TABLET, FILM COATED ORAL
Qty: 30 | Refills: 10
Start: 2024-02-14 | End: 2025-01-08

## 2024-02-14 RX ORDER — CLOPIDOGREL BISULFATE 75 MG/1
1 TABLET, FILM COATED ORAL
Qty: 30 | Refills: 10 | DISCHARGE
Start: 2024-02-14 | End: 2025-01-08

## 2024-02-14 RX ORDER — TIZANIDINE 4 MG/1
2 TABLET ORAL
Refills: 0 | DISCHARGE

## 2024-02-14 RX ADMIN — SODIUM CHLORIDE 75 MILLILITER(S): 9 INJECTION INTRAMUSCULAR; INTRAVENOUS; SUBCUTANEOUS at 11:44

## 2024-02-14 RX ADMIN — SODIUM CHLORIDE 250 MILLILITER(S): 9 INJECTION INTRAMUSCULAR; INTRAVENOUS; SUBCUTANEOUS at 07:41

## 2024-02-14 RX ADMIN — SODIUM CHLORIDE 75 MILLILITER(S): 9 INJECTION INTRAMUSCULAR; INTRAVENOUS; SUBCUTANEOUS at 09:25

## 2024-02-14 NOTE — CHART NOTE - NSCHARTNOTEFT_GEN_A_CORE
Cardiac Rehab Referral     GEETHA KOEHLER 83yF  MRN: [id number]  : 41  Admit: 24  Patient is a 83y old  Female who presents with a chief complaint of LHC (2024 13:27)      Pt.  s/p [facility] with RENAN to proximal LAD    Qualified for cardiac rehab     -Patient educated on benefits of cardiac rehab. Information packet provided  with participating locations given to patient along with being advised to contact their insurance company for list of participating providers.   -Patient discharge paperwork will included detailed cardiovascular history, medications, testing/treatments and advised to provide all information with their primary outpatient cardiologist at a follow in 1-2 weeks from discharge.         Patient refused referral information sheet with [facility] website provided

## 2024-02-14 NOTE — ASU DISCHARGE PLAN (ADULT/PEDIATRIC) - CARE PROVIDER_API CALL
Omid Caputo  Cardiovascular Disease  [address], [address]  [place] [address]  Phone: [phone]  Fax: [phone]  Follow Up Time:

## 2024-02-14 NOTE — PROGRESS NOTE ADULT - SUBJECTIVE AND OBJECTIVE BOX
Nurse Practitioner Progress note:     HPI:  83yr old female PMH HTN, aortic stenosis, HLD, PVC's carotid atherosclerosis with c/o sharp spasm in her chest at rest which then goes away. Pt. reports that this does not occur with any increased activity. Pt. had a PET Myocardial perfusion which revealed no evidence of myocardial infarction or ischemia. Transthoracic echo revealed mild mitral regurgitation EF between 50-55%. Pt referred for C/RHC for further evaluation. (13 Feb 2024 13:27)    T(C): 36.7 (02-14-24 @ 07:10), Max: 36.7 (02-14-24 @ 07:10)  HR: 75 (02-14-24 @ 13:10) (75 - 88)  BP: 146/91 (02-14-24 @ 13:10) (139/99 - 170/94)  RR: 16 (02-14-24 @ 13:10) (16 - 16)  SpO2: 96% (02-14-24 @ 13:10) (96% - 98%)  Wt(kg): --    PHYSICAL EXAM:  Neurologic: Non-focal, AxOx3.  No neuro deficits  Vascular: Peripheral pulses palpable 2+ bilaterally  Procedure Site: Rt. radial band removed by RN manual pressure applied x20 minutes site benign soft no bleeding no hematoma pressure dressing with gauze applied to site no c/o numbness/tingling, <3sec cap refill, fingers/hand warm to touch     PROCEDURE RESULTS:  S/P LHC S/P RENAN to proximal LAD       ASSESSMENT/PLAN:  83yr old female PMH HTN, aortic stenosis, HLD, PVC's carotid atherosclerosis with c/o sharp spasm in her chest at rest which then goes away. Pt. reports that this does not occur with any increased activity. Pt. had a PET Myocardial perfusion which revealed no evidence of myocardial infarction or ischemia. Transthoracic echo revealed mild mitral regurgitation EF between 50-55%. S/P RENAN to proximal LAD   	    -VS, labs, diet, activity as per PCI orders  -IV hydration  -Encourage PO fluids  -ASA 81mg  -Plavix 75mg  -Entresto 49/51mg  -Lipitor 20mg   -Pt. qualifies for cardiac rehab, educational material provided to patient pt. declines   -Sedation instructions reviewed with patient   -Plan of care D/W pt. and MD  -Discussed therapeutic lifestyle changes to reduce risk factors such as following a cardiac diet, weight loss, maintaining a healthy weight, exercise, smoking cessation, medication compliance, and regular follow-up  with MD to know our numbers (BP, cholesterol, weight, and glucose  -If pt. remains stable discharge home later today   - Follow-up AM labs/EKG/site check  -Follow-up with attending

## 2024-02-14 NOTE — PACU DISCHARGE NOTE - COMMENTS
Pt. DC as ordered. Pt. in no acute distress at this time- Pt. denies CP and SOB-- Right Wrist and Brachial area dressing dry and intact- Pt. verbalizes DC Instructions and F/U - Daughter Yary to drive patient home- Safety maintained

## 2024-02-20 DIAGNOSIS — I25.110 ATHEROSCLEROTIC HEART DISEASE OF NATIVE CORONARY ARTERY WITH UNSTABLE ANGINA PECTORIS: ICD-10-CM

## 2024-02-20 DIAGNOSIS — I10 ESSENTIAL (PRIMARY) HYPERTENSION: ICD-10-CM

## 2024-03-04 ENCOUNTER — APPOINTMENT (OUTPATIENT)
Dept: ORTHOPEDIC SURGERY | Facility: CLINIC | Age: 84
End: 2024-03-04
Payer: MEDICARE

## 2024-03-04 PROCEDURE — 73564 X-RAY EXAM KNEE 4 OR MORE: CPT | Mod: 50

## 2024-03-04 PROCEDURE — 20610 DRAIN/INJ JOINT/BURSA W/O US: CPT | Mod: RT

## 2024-03-04 PROCEDURE — 99214 OFFICE O/P EST MOD 30 MIN: CPT | Mod: 25

## 2024-03-04 NOTE — PROCEDURE
[de-identified] : 3/4/24: Right knee injection - Steroid The risks, benefits, and alternatives of the injection were reviewed/discussed with the patient today in office and all of their questions were answered. We discussed possible side effects and efficacy of this injection.  The patient consented to the procedure.  Site and side were verified with the patient.  The injection site was prepped with chloroprep.  Cold spray was utilized to numb the skin. Utilizing sterile technique, 1 ml 40 mg methylprednisolone, 4 ml 1% Lidocaine, and 5 mL 0.25% Bupivicaine were injected. A sterile bandage was applied. The patient tolerated the procedure well and there were no complications.   4/18/2023-bilateral knee Injection - hyaluronic acid 4/4/2023-bilateral knee injection - Steroid

## 2024-03-04 NOTE — PHYSICAL EXAM
[de-identified] : Bilateral Knee Inspection: no effusion Wounds: None Alignment: Mild varus Palpation: tender to palpation at medial joint line ROM active (in degrees): 0-140 with crepitus through the arc of motion Ligamentous laxity: all ligaments appear stable, stable to varus stress test, stable to valgus stress test Meniscal Test: Negative McMurrays, negative Cortez. Muscle Test: good quad strength. [de-identified] : 3/4/24:left knee xrays, standing AP/Lateral and Merchant films, and 45 degree PA standing view are reviewed and demonstrate diffuse tricompartmental degenerative arthritis, medial joint space narrowing, marginal osteophytes, bone on bone with sclerosis, patellofemoral joint space narrowing Right knee xrays, standing AP/Lateral and Merchant films, and 45 degree PA standing view are reviewed and demonstrate mild to moderate tricompartmental degenerative arthritis, medial joint space narrowing, patellofemoral joint space narrowing  4/4/2023-left knee xrays, standing AP/Lateral and Merchant films, and 45 degree PA standing view are reviewed and demonstrate diffuse tricompartmental degenerative arthritis, medial joint space narrowing, marginal osteophytes, bone on bone with sclerosis, patellofemoral joint space narrowing Right knee xrays, standing AP/Lateral and Merchant films, and 45 degree PA standing view are reviewed and demonstrate mild to moderate tricompartmental degenerative arthritis, medial joint space narrowing, patellofemoral joint space narrowing

## 2024-03-04 NOTE — DISCUSSION/SUMMARY
[de-identified] : Right knee internal derangement.  Bilateral knee osteoarthritis, left worse than right  Extensive discussion of the natural history of this disease was had with the patient.  We discussed the treatment options ranging from conservative therapy which includes anti-inflammatories, steroid injections, physical therapy, weight loss, and activity modification.  We did discuss that the ultimate treatment for arthritis is a joint replacement.  Would like to avoid surgery.  At this time we will continue conservative treatment.   -Patient elected for steroid injection today in the right knee.  If the pain does not improve we may consider an MRI to evaluate the meniscus.

## 2024-03-04 NOTE — HISTORY OF PRESENT ILLNESS
[de-identified] : 3/4/24: Patient here for follow-up bilateral knee pain.  States the right knee has been bothering her.  Left knee is not bothering her much lately.  States that steroid injection worked well with the gel did not too much. She would like to try these again for the right knee.  Just had a stent placed 2 weeks ago, on aspirin Plavix.  4/4/2023-patient presents with bilateral knee pain right worse than left.  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  Does complain of buckling pain.  States she has had steroid injections a long time ago which worked very well.  Rheumatology for seronegative RA.  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  Allergies to penicillin, Ceftin and Levaquin-hives.  Denies anticoagulation or other medical issues.

## 2024-03-15 NOTE — POST DISCHARGE NOTE - DETAILS:
Post procedure phone call completed; patient understood all discharge paperwork. No questions regarding medications or pain management. MD follow up appointment made. Patient was able to rest when they were discharged. Patient will recommend [facility] to others, has no complaints of hospital stay, and was satisfied with care. Instructed patient to contact provider with any further questions or concerns.
30 day post procedure phone call completed;  No questions regarding medications or pain management. Continues to follow up with MD. Patient will continue to recommend [facility], no complaints of hospital stay, satisfied with care. Instructed patient to contact provider with any further questions or concerns.

## 2024-04-19 LAB
25(OH)D3 SERPL-MCNC: 45.7 NG/ML
ALBUMIN SERPL ELPH-MCNC: 4 G/DL
ALP BLD-CCNC: 89 U/L
ALT SERPL-CCNC: 15 U/L
ANION GAP SERPL CALC-SCNC: 11 MMOL/L
AST SERPL-CCNC: 24 U/L
BILIRUB SERPL-MCNC: 0.3 MG/DL
BUN SERPL-MCNC: 25 MG/DL
CALCIUM SERPL-MCNC: 9.6 MG/DL
CHLORIDE SERPL-SCNC: 107 MMOL/L
CO2 SERPL-SCNC: 23 MMOL/L
CREAT SERPL-MCNC: 0.89 MG/DL
EGFR: 65 ML/MIN/1.73M2
GLUCOSE SERPL-MCNC: 97 MG/DL
POTASSIUM SERPL-SCNC: 4.5 MMOL/L
PROT SERPL-MCNC: 6.5 G/DL
SODIUM SERPL-SCNC: 141 MMOL/L

## 2024-04-22 LAB
ANION GAP SERPL CALC-SCNC: 12 MMOL/L
BUN SERPL-MCNC: 25 MG/DL
CALCIUM SERPL-MCNC: 9.7 MG/DL
CHLORIDE SERPL-SCNC: 106 MMOL/L
CO2 SERPL-SCNC: 22 MMOL/L
CREAT SERPL-MCNC: 0.88 MG/DL
EGFR: 66 ML/MIN/1.73M2
GLUCOSE SERPL-MCNC: 96 MG/DL
HCT VFR BLD CALC: 37.2 %
HGB BLD-MCNC: 11.9 G/DL
MCHC RBC-ENTMCNC: 30.5 PG
MCHC RBC-ENTMCNC: 32 GM/DL
MCV RBC AUTO: 95.4 FL
PLATELET # BLD AUTO: 254 K/UL
POTASSIUM SERPL-SCNC: 4.5 MMOL/L
RBC # BLD: 3.9 M/UL
RBC # FLD: 14.6 %
SODIUM SERPL-SCNC: 141 MMOL/L
WBC # FLD AUTO: 8.63 K/UL

## 2024-04-23 ENCOUNTER — MED ADMIN CHARGE (OUTPATIENT)
Age: 84
End: 2024-04-23

## 2024-04-23 ENCOUNTER — APPOINTMENT (OUTPATIENT)
Dept: RHEUMATOLOGY | Facility: CLINIC | Age: 84
End: 2024-04-23
Payer: MEDICARE

## 2024-04-23 VITALS
TEMPERATURE: 97.5 F | OXYGEN SATURATION: 96 % | RESPIRATION RATE: 16 BRPM | SYSTOLIC BLOOD PRESSURE: 137 MMHG | HEART RATE: 85 BPM | DIASTOLIC BLOOD PRESSURE: 75 MMHG

## 2024-04-23 PROCEDURE — 96372 THER/PROPH/DIAG INJ SC/IM: CPT

## 2024-04-23 RX ORDER — DENOSUMAB 60 MG/ML
60 INJECTION SUBCUTANEOUS
Qty: 1 | Refills: 0 | Status: ACTIVE | COMMUNITY
Start: 2021-01-05

## 2024-04-24 RX ORDER — DENOSUMAB 60 MG/ML
60 INJECTION SUBCUTANEOUS
Qty: 1 | Refills: 0 | Status: COMPLETED | OUTPATIENT
Start: 2024-04-22

## 2024-06-13 ENCOUNTER — APPOINTMENT (OUTPATIENT)
Dept: ORTHOPEDIC SURGERY | Facility: CLINIC | Age: 84
End: 2024-06-13
Payer: MEDICARE

## 2024-06-13 VITALS
WEIGHT: 100 LBS | HEIGHT: 61 IN | BODY MASS INDEX: 18.88 KG/M2 | HEART RATE: 86 BPM | DIASTOLIC BLOOD PRESSURE: 63 MMHG | SYSTOLIC BLOOD PRESSURE: 116 MMHG

## 2024-06-13 DIAGNOSIS — M17.0 BILATERAL PRIMARY OSTEOARTHRITIS OF KNEE: ICD-10-CM

## 2024-06-13 DIAGNOSIS — M23.91 UNSPECIFIED INTERNAL DERANGEMENT OF RIGHT KNEE: ICD-10-CM

## 2024-06-13 PROCEDURE — 99214 OFFICE O/P EST MOD 30 MIN: CPT | Mod: 25

## 2024-06-13 PROCEDURE — 20610 DRAIN/INJ JOINT/BURSA W/O US: CPT | Mod: RT

## 2024-06-13 NOTE — DISCUSSION/SUMMARY
[de-identified] : Right knee internal derangement.  Bilateral knee osteoarthritis, left worse than right  Extensive discussion of the natural history of this disease was had with the patient.  We discussed the treatment options ranging from conservative therapy which includes anti-inflammatories, steroid injections, physical therapy, weight loss, and activity modification.  We did discuss that the ultimate treatment for arthritis is a joint replacement.  Would like to avoid surgery.  At this time we will continue conservative treatment.   -Patient elected for steroid injection today in the right knee.  If the pain does not improve we may consider an MRI to evaluate the meniscus.  The patient's current medication management of their orthopedic diagnosis was reviewed today: (1) We discussed a comprehensive treatment plan that included possible pharmaceutical management involving the use of prescription strength medications including but not limited to options such as oral Ibuprofen 400mg QID, once daily Meloxicam 15 mg, or 500-650 mg Tylenol versus over the counter oral medications and topical prescription NSAID Pennsaid vs over the counter Voltaren gel. (2) There is a moderate risk of morbidity with further treatment, especially from use of prescription strength medications and possible side effects of these medications which include upset stomach with oral medications, skin reactions to topical medications and cardiac/renal issues with long term use. (3) I recommended that the patient follow-up with their medical physician to discuss any significant specific potential issues with long term medication use such as interactions with current medications or with exacerbation of underlying medical comorbidities. (4) The benefits and risks associated with use of injectable, oral or topical, prescription and over the counter anti-inflammatory medications were discussed with the patient. The patient voiced understanding of the risks including but not limited to bleeding, stroke, kidney dysfunction, heart disease, and were referred to the black box warning label for further information.

## 2024-06-13 NOTE — PHYSICAL EXAM
[de-identified] : Right knee Inspection: no effusion Wounds: None Alignment: Mild varus Palpation: tender to palpation at medial joint line ROM active (in degrees): 0-140 with crepitus through the arc of motion Ligamentous laxity: all ligaments appear stable, stable to varus stress test, stable to valgus stress test Meniscal Test: Positive McMurrays, positive Cortez. Muscle Test: good quad strength. [de-identified] : 3/4/24:left knee xrays, standing AP/Lateral and Merchant films, and 45 degree PA standing view are reviewed and demonstrate diffuse tricompartmental degenerative arthritis, medial joint space narrowing, marginal osteophytes, bone on bone with sclerosis, patellofemoral joint space narrowing Right knee xrays, standing AP/Lateral and Merchant films, and 45 degree PA standing view are reviewed and demonstrate mild to moderate tricompartmental degenerative arthritis, medial joint space narrowing, patellofemoral joint space narrowing  4/4/2023-left knee xrays, standing AP/Lateral and Merchant films, and 45 degree PA standing view are reviewed and demonstrate diffuse tricompartmental degenerative arthritis, medial joint space narrowing, marginal osteophytes, bone on bone with sclerosis, patellofemoral joint space narrowing Right knee xrays, standing AP/Lateral and Merchant films, and 45 degree PA standing view are reviewed and demonstrate mild to moderate tricompartmental degenerative arthritis, medial joint space narrowing, patellofemoral joint space narrowing

## 2024-06-13 NOTE — PROCEDURE
[de-identified] : 6/13/24: Right knee injection - Steroid The risks, benefits, and alternatives of the injection were reviewed/discussed with the patient today in office and all of their questions were answered. We discussed possible side effects and efficacy of this injection.  The patient consented to the procedure.  Site and side were verified with the patient.  The injection site was prepped with chloroprep.  Cold spray was utilized to numb the skin. Utilizing sterile technique, 1 ml 40 mg methylprednisolone, 4 ml 1% Lidocaine, and 5 mL 0.25% Bupivicaine were injected. A sterile bandage was applied. The patient tolerated the procedure well and there were no complications.  3/4/24: Right knee injection - Steroid 4/18/2023-bilateral knee Injection - hyaluronic acid 4/4/2023-bilateral knee injection - Steroid

## 2024-06-13 NOTE — HISTORY OF PRESENT ILLNESS
[de-identified] : 6/13/24: Patient here for follow-up right knee pain.  States that steroid injection worked well up until couple weeks ago.  She would like to try another 1.  Cannot take NSAIDs due to the steroid  3/4/24: Patient here for follow-up bilateral knee pain.  States the right knee has been bothering her.  Left knee is not bothering her much lately.  States that steroid injection worked well with the gel did not too much. She would like to try these again for the right knee.  Just had a stent placed 2 weeks ago, on aspirin Plavix.  4/4/2023-patient presents with bilateral knee pain right worse than left.  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  Does complain of buckling pain.  States she has had steroid injections a long time ago which worked very well.  Rheumatology for seronegative RA.  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  Allergies to penicillin, Ceftin and Levaquin-hives.  Denies anticoagulation or other medical issues.

## 2024-07-25 ENCOUNTER — APPOINTMENT (OUTPATIENT)
Dept: RHEUMATOLOGY | Facility: CLINIC | Age: 84
End: 2024-07-25
Payer: MEDICARE

## 2024-07-25 VITALS
DIASTOLIC BLOOD PRESSURE: 68 MMHG | BODY MASS INDEX: 17.75 KG/M2 | OXYGEN SATURATION: 96 % | HEART RATE: 86 BPM | HEIGHT: 61 IN | SYSTOLIC BLOOD PRESSURE: 110 MMHG | TEMPERATURE: 97.6 F | WEIGHT: 94 LBS

## 2024-07-25 DIAGNOSIS — M17.0 BILATERAL PRIMARY OSTEOARTHRITIS OF KNEE: ICD-10-CM

## 2024-07-25 DIAGNOSIS — M05.20 RHEUMATOID VASCULITIS WITH RHEUMATOID ARTHRITIS OF UNSPECIFIED SITE: ICD-10-CM

## 2024-07-25 DIAGNOSIS — R76.8 OTHER SPECIFIED ABNORMAL IMMUNOLOGICAL FINDINGS IN SERUM: ICD-10-CM

## 2024-07-25 PROCEDURE — 99214 OFFICE O/P EST MOD 30 MIN: CPT

## 2024-07-25 PROCEDURE — 36415 COLL VENOUS BLD VENIPUNCTURE: CPT

## 2024-07-25 RX ORDER — CLOPIDOGREL 75 MG/1
TABLET, FILM COATED ORAL
Refills: 0 | Status: ACTIVE | COMMUNITY

## 2024-07-25 RX ORDER — ESCITALOPRAM OXALATE 5 MG/1
TABLET, FILM COATED ORAL
Refills: 0 | Status: ACTIVE | COMMUNITY

## 2024-07-31 LAB
25(OH)D3 SERPL-MCNC: 50.2 NG/ML
ALBUMIN SERPL ELPH-MCNC: 4.5 G/DL
ALP BLD-CCNC: 78 U/L
ALT SERPL-CCNC: 15 U/L
ANION GAP SERPL CALC-SCNC: 14 MMOL/L
AST SERPL-CCNC: 25 U/L
BASOPHILS # BLD AUTO: 0.1 K/UL
BASOPHILS NFR BLD AUTO: 1.2 %
BILIRUB SERPL-MCNC: 0.3 MG/DL
BUN SERPL-MCNC: 22 MG/DL
CALCIUM SERPL-MCNC: 9.4 MG/DL
CHLORIDE SERPL-SCNC: 104 MMOL/L
CO2 SERPL-SCNC: 24 MMOL/L
CREAT SERPL-MCNC: 0.91 MG/DL
CRP SERPL-MCNC: <3 MG/L
EGFR: 63 ML/MIN/1.73M2
EOSINOPHIL # BLD AUTO: 0.29 K/UL
EOSINOPHIL NFR BLD AUTO: 3.6 %
ERYTHROCYTE [SEDIMENTATION RATE] IN BLOOD BY WESTERGREN METHOD: 11 MM/HR
GLUCOSE SERPL-MCNC: 89 MG/DL
HCT VFR BLD CALC: 41.1 %
HGB BLD-MCNC: 12.5 G/DL
IMM GRANULOCYTES NFR BLD AUTO: 0.2 %
LYMPHOCYTES # BLD AUTO: 1.91 K/UL
LYMPHOCYTES NFR BLD AUTO: 23.6 %
MAN DIFF?: NORMAL
MCHC RBC-ENTMCNC: 29.7 PG
MCHC RBC-ENTMCNC: 30.4 GM/DL
MCV RBC AUTO: 97.6 FL
MONOCYTES # BLD AUTO: 0.46 K/UL
MONOCYTES NFR BLD AUTO: 5.7 %
NEUTROPHILS # BLD AUTO: 5.31 K/UL
NEUTROPHILS NFR BLD AUTO: 65.7 %
PLATELET # BLD AUTO: 250 K/UL
POTASSIUM SERPL-SCNC: 5.6 MMOL/L
PROT SERPL-MCNC: 7.2 G/DL
RBC # BLD: 4.21 M/UL
RBC # FLD: 13.6 %
SODIUM SERPL-SCNC: 142 MMOL/L
WBC # FLD AUTO: 8.09 K/UL

## 2024-07-31 RX ORDER — DENOSUMAB 60 MG/ML
60 INJECTION SUBCUTANEOUS
Qty: 1 | Refills: 0 | Status: ACTIVE | COMMUNITY
Start: 2024-07-31 | End: 1900-01-01

## 2024-09-17 ENCOUNTER — APPOINTMENT (OUTPATIENT)
Dept: ORTHOPEDIC SURGERY | Facility: CLINIC | Age: 84
End: 2024-09-17
Payer: MEDICARE

## 2024-09-17 VITALS
DIASTOLIC BLOOD PRESSURE: 63 MMHG | HEART RATE: 85 BPM | TEMPERATURE: 97 F | WEIGHT: 94 LBS | BODY MASS INDEX: 17.75 KG/M2 | SYSTOLIC BLOOD PRESSURE: 104 MMHG | HEIGHT: 61 IN

## 2024-09-17 DIAGNOSIS — M17.0 BILATERAL PRIMARY OSTEOARTHRITIS OF KNEE: ICD-10-CM

## 2024-09-17 PROCEDURE — 99214 OFFICE O/P EST MOD 30 MIN: CPT | Mod: 25

## 2024-09-17 PROCEDURE — 73564 X-RAY EXAM KNEE 4 OR MORE: CPT | Mod: 50

## 2024-09-17 PROCEDURE — 20610 DRAIN/INJ JOINT/BURSA W/O US: CPT | Mod: RT

## 2024-09-17 NOTE — PHYSICAL EXAM
[de-identified] : Bilateral knee Inspection: no effusion Wounds: None Alignment: Mild varus Palpation: tender to palpation at medial joint line ROM active (in degrees): 0-140 with crepitus through the arc of motion Ligamentous laxity: all ligaments appear stable, stable to varus stress test, stable to valgus stress test Muscle Test: good quad strength. [de-identified] : 9/17/24: left knee xrays, standing AP/Lateral and Merchant films, and 45 degree PA standing view are reviewed and demonstrate diffuse tricompartmental degenerative arthritis, medial joint space narrowing, marginal osteophytes, bone on bone with sclerosis, patellofemoral joint space narrowing Right knee xrays, standing AP/Lateral and Merchant films, and 45 degree PA standing view are reviewed and demonstrate mild to moderate tricompartmental degenerative arthritis, medial joint space narrowing, patellofemoral joint space narrowing  3/4/24:left knee xrays, standing AP/Lateral and Merchant films, and 45 degree PA standing view are reviewed and demonstrate diffuse tricompartmental degenerative arthritis, medial joint space narrowing, marginal osteophytes, bone on bone with sclerosis, patellofemoral joint space narrowing Right knee xrays, standing AP/Lateral and Merchant films, and 45 degree PA standing view are reviewed and demonstrate mild to moderate tricompartmental degenerative arthritis, medial joint space narrowing, patellofemoral joint space narrowing  4/4/2023-left knee xrays, standing AP/Lateral and Merchant films, and 45 degree PA standing view are reviewed and demonstrate diffuse tricompartmental degenerative arthritis, medial joint space narrowing, marginal osteophytes, bone on bone with sclerosis, patellofemoral joint space narrowing Right knee xrays, standing AP/Lateral and Merchant films, and 45 degree PA standing view are reviewed and demonstrate mild to moderate tricompartmental degenerative arthritis, medial joint space narrowing, patellofemoral joint space narrowing

## 2024-09-17 NOTE — PHYSICAL EXAM
[de-identified] : Bilateral knee Inspection: no effusion Wounds: None Alignment: Mild varus Palpation: tender to palpation at medial joint line ROM active (in degrees): 0-140 with crepitus through the arc of motion Ligamentous laxity: all ligaments appear stable, stable to varus stress test, stable to valgus stress test Muscle Test: good quad strength. [de-identified] : 9/17/24: left knee xrays, standing AP/Lateral and Merchant films, and 45 degree PA standing view are reviewed and demonstrate diffuse tricompartmental degenerative arthritis, medial joint space narrowing, marginal osteophytes, bone on bone with sclerosis, patellofemoral joint space narrowing Right knee xrays, standing AP/Lateral and Merchant films, and 45 degree PA standing view are reviewed and demonstrate mild to moderate tricompartmental degenerative arthritis, medial joint space narrowing, patellofemoral joint space narrowing  3/4/24:left knee xrays, standing AP/Lateral and Merchant films, and 45 degree PA standing view are reviewed and demonstrate diffuse tricompartmental degenerative arthritis, medial joint space narrowing, marginal osteophytes, bone on bone with sclerosis, patellofemoral joint space narrowing Right knee xrays, standing AP/Lateral and Merchant films, and 45 degree PA standing view are reviewed and demonstrate mild to moderate tricompartmental degenerative arthritis, medial joint space narrowing, patellofemoral joint space narrowing  4/4/2023-left knee xrays, standing AP/Lateral and Merchant films, and 45 degree PA standing view are reviewed and demonstrate diffuse tricompartmental degenerative arthritis, medial joint space narrowing, marginal osteophytes, bone on bone with sclerosis, patellofemoral joint space narrowing Right knee xrays, standing AP/Lateral and Merchant films, and 45 degree PA standing view are reviewed and demonstrate mild to moderate tricompartmental degenerative arthritis, medial joint space narrowing, patellofemoral joint space narrowing

## 2024-09-17 NOTE — PROCEDURE
[de-identified] : 9/17/24: Right knee Injection - Steroid (Methylprednisolone) The risks, benefits, and alternatives of the injection were reviewed/discussed with the patient today in office and all of their questions were answered. We discussed possible side effects and efficacy of this injection.  The patient consented to the procedure.  Prior to injection, a Time Out was conducted in accordance with [facility] policy and the site and nature of procedure verified with the patient.  Site and side were verified with the patient.  The injection site was prepped with chloroprep.  Cold spray was utilized to numb the skin. Utilizing sterile technique, 1 ml 40 mg methylprednisolone, 4 ml 1% Lidocaine, and 5 mL 0.25% Bupivicaine were injected. A sterile bandage was applied. The patient tolerated the procedure well and there were no complications.  6/13/24: Right knee injection - Steroid 3/4/24: Right knee injection - Steroid 4/18/2023-bilateral knee Injection - hyaluronic acid 4/4/2023-bilateral knee injection - Steroid

## 2024-09-17 NOTE — DISCUSSION/SUMMARY
[de-identified] : Bilateral knee osteoarthritis, left worse than right radiographically  Extensive discussion of the natural history of this disease was had with the patient.  We discussed the treatment options ranging from conservative therapy which includes anti-inflammatories, steroid injections, physical therapy, weight loss, and activity modification.  We did discuss that the ultimate treatment for arthritis is a joint replacement.  Would like to avoid surgery.  At this time we will continue conservative treatment.   -Patient elected for steroid injection today in the right knee.  Discussed we could consider gel injections, patient like to hold off at this time.  My cumulative time spent on this patient's visit included: Preparation for the visit, review of the medical records, review of pertinent diagnostic studies, examination and counseling of the patient on the above diagnosis, treatment plan and prognosis, orders of diagnostic tests, medications and/or appropriate procedures and documentation in the medical records of today's visit. Time spent on separately reportable procedures is excluded.

## 2024-09-17 NOTE — PROCEDURE
[de-identified] : 9/17/24: Right knee Injection - Steroid (Methylprednisolone) The risks, benefits, and alternatives of the injection were reviewed/discussed with the patient today in office and all of their questions were answered. We discussed possible side effects and efficacy of this injection.  The patient consented to the procedure.  Prior to injection, a Time Out was conducted in accordance with [facility] policy and the site and nature of procedure verified with the patient.  Site and side were verified with the patient.  The injection site was prepped with chloroprep.  Cold spray was utilized to numb the skin. Utilizing sterile technique, 1 ml 40 mg methylprednisolone, 4 ml 1% Lidocaine, and 5 mL 0.25% Bupivicaine were injected. A sterile bandage was applied. The patient tolerated the procedure well and there were no complications.  6/13/24: Right knee injection - Steroid 3/4/24: Right knee injection - Steroid 4/18/2023-bilateral knee Injection - hyaluronic acid 4/4/2023-bilateral knee injection - Steroid

## 2024-09-17 NOTE — DISCUSSION/SUMMARY
[de-identified] : Bilateral knee osteoarthritis, left worse than right radiographically  Extensive discussion of the natural history of this disease was had with the patient.  We discussed the treatment options ranging from conservative therapy which includes anti-inflammatories, steroid injections, physical therapy, weight loss, and activity modification.  We did discuss that the ultimate treatment for arthritis is a joint replacement.  Would like to avoid surgery.  At this time we will continue conservative treatment.   -Patient elected for steroid injection today in the right knee.  Discussed we could consider gel injections, patient like to hold off at this time.  My cumulative time spent on this patient's visit included: Preparation for the visit, review of the medical records, review of pertinent diagnostic studies, examination and counseling of the patient on the above diagnosis, treatment plan and prognosis, orders of diagnostic tests, medications and/or appropriate procedures and documentation in the medical records of today's visit. Time spent on separately reportable procedures is excluded.

## 2024-09-17 NOTE — HISTORY OF PRESENT ILLNESS
[de-identified] : 9/17/24: Patient here for follow-up right knee pain.  States the steroid injection worked for 2 months this time.  Does have some buckling in both knees.  Right knee is mostly the problem however.  She would like to try another injection in this knee.  Takes meloxicam rarely.  Is on aspirin and Plavix.  6/13/24: Patient here for follow-up right knee pain.  States that steroid injection worked well up until couple weeks ago.  She would like to try another 1.  Cannot take NSAIDs due to the steroid  3/4/24: Patient here for follow-up bilateral knee pain.  States the right knee has been bothering her.  Left knee is not bothering her much lately.  States that steroid injection worked well with the gel did not too much. She would like to try these again for the right knee.  Just had a stent placed 2 weeks ago, on aspirin Plavix.  4/4/2023-patient presents with bilateral knee pain right worse than left.  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  Does complain of buckling pain.  States she has had steroid injections a long time ago which worked very well.  Rheumatology for seronegative RA.  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  Allergies to penicillin, Ceftin and Levaquin-hives.  Denies anticoagulation or other medical issues.

## 2024-09-17 NOTE — HISTORY OF PRESENT ILLNESS
Per mother, patient with increased generalized raised rash x 1 day. + itching. Better today. Mother denies any new habits; lotions, soaps, foods. No respiratory symptoms noted. [de-identified] : 9/17/24: Patient here for follow-up right knee pain.  States the steroid injection worked for 2 months this time.  Does have some buckling in both knees.  Right knee is mostly the problem however.  She would like to try another injection in this knee.  Takes meloxicam rarely.  Is on aspirin and Plavix.  6/13/24: Patient here for follow-up right knee pain.  States that steroid injection worked well up until couple weeks ago.  She would like to try another 1.  Cannot take NSAIDs due to the steroid  3/4/24: Patient here for follow-up bilateral knee pain.  States the right knee has been bothering her.  Left knee is not bothering her much lately.  States that steroid injection worked well with the gel did not too much. She would like to try these again for the right knee.  Just had a stent placed 2 weeks ago, on aspirin Plavix.  4/4/2023-patient presents with bilateral knee pain right worse than left.  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  Does complain of buckling pain.  States she has had steroid injections a long time ago which worked very well.  Rheumatology for seronegative RA.  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  Allergies to penicillin, Ceftin and Levaquin-hives.  Denies anticoagulation or other medical issues.

## 2024-10-25 ENCOUNTER — APPOINTMENT (OUTPATIENT)
Dept: RHEUMATOLOGY | Facility: CLINIC | Age: 84
End: 2024-10-25

## 2024-10-25 ENCOUNTER — MED ADMIN CHARGE (OUTPATIENT)
Age: 84
End: 2024-10-25

## 2024-10-25 VITALS
OXYGEN SATURATION: 95 % | RESPIRATION RATE: 18 BRPM | SYSTOLIC BLOOD PRESSURE: 133 MMHG | TEMPERATURE: 97.6 F | DIASTOLIC BLOOD PRESSURE: 82 MMHG | HEART RATE: 86 BPM

## 2024-10-25 PROCEDURE — 96372 THER/PROPH/DIAG INJ SC/IM: CPT

## 2024-10-25 RX ORDER — DENOSUMAB 60 MG/ML
60 INJECTION SUBCUTANEOUS
Qty: 1 | Refills: 0 | Status: COMPLETED | OUTPATIENT
Start: 2024-10-18

## 2025-01-07 ENCOUNTER — APPOINTMENT (OUTPATIENT)
Dept: RHEUMATOLOGY | Facility: CLINIC | Age: 85
End: 2025-01-07
Payer: MEDICARE

## 2025-01-07 VITALS
BODY MASS INDEX: 17.37 KG/M2 | OXYGEN SATURATION: 92 % | WEIGHT: 92 LBS | SYSTOLIC BLOOD PRESSURE: 102 MMHG | TEMPERATURE: 97.6 F | DIASTOLIC BLOOD PRESSURE: 68 MMHG | HEART RATE: 89 BPM | HEIGHT: 61 IN

## 2025-01-07 DIAGNOSIS — M05.20 RHEUMATOID VASCULITIS WITH RHEUMATOID ARTHRITIS OF UNSPECIFIED SITE: ICD-10-CM

## 2025-01-07 DIAGNOSIS — K29.60 OTHER GASTRITIS W/OUT BLEEDING: ICD-10-CM

## 2025-01-07 DIAGNOSIS — M81.0 AGE-RELATED OSTEOPOROSIS W/OUT CURRENT PATHOLOGICAL FRACTURE: ICD-10-CM

## 2025-01-07 DIAGNOSIS — M17.0 BILATERAL PRIMARY OSTEOARTHRITIS OF KNEE: ICD-10-CM

## 2025-01-07 DIAGNOSIS — R76.8 OTHER SPECIFIED ABNORMAL IMMUNOLOGICAL FINDINGS IN SERUM: ICD-10-CM

## 2025-01-07 PROCEDURE — G2211 COMPLEX E/M VISIT ADD ON: CPT

## 2025-01-07 PROCEDURE — 36415 COLL VENOUS BLD VENIPUNCTURE: CPT

## 2025-01-07 PROCEDURE — 99214 OFFICE O/P EST MOD 30 MIN: CPT

## 2025-01-12 RX ORDER — ECONAZOLE NITRATE 10 MG/G
1 CREAM TOPICAL
Qty: 85 | Refills: 0 | Status: ACTIVE | COMMUNITY
Start: 2024-08-14

## 2025-01-12 RX ORDER — ESCITALOPRAM OXALATE 10 MG/1
10 TABLET ORAL
Qty: 90 | Refills: 0 | Status: ACTIVE | COMMUNITY
Start: 2024-08-08

## 2025-01-12 NOTE — ASSESSMENT
[FreeTextEntry1] : 83 y/o woman with hx of COPD/asthma, HLD, paralytic sleep disorder, erosive gastritis, DJD/Levoscoliosis spine, presents for seronegative RA/+RNP wth negative MARISSA (combination of inflammatory arthritis, raynaud's, fatigue, b/L nodular opacities seen in lung likely related). She is currently off prednisone. She is no longer on Rituxan due to covid climate, and wants to continue to remain off of it. Her RA is relatively stable.  Her OA is improved   Plan:  RA/+RNP: overall stable, joints are doing well.  -continue plaquenil 200mg daily for seronegative RA/+RNP. Needs to make a new eye appt. -Off MTX and folic acid due to intolerance. -IM depomedrol given previously for ankle pains and right 1st MCP swelling, responded well. -Did well on Rituxan. Last dose 6/29/21. Wanted to stay off. -We discussed possibly moving to actemra -avoid NSAIDS due to erosive gastritis  Pulmonary infiltrates -CT chest ordered due to weight loss shows peripheral infiltrates. She had discussed possible bronchoscopy, and possible tx fo MAC. I've encouraged her to keep follow ups with pulm regarding this. -She has hx of smoking, distant. -routine screening with PFTs and ECHO. -prev declined amlodipine for raynaud's. To continue to use conservative measures for hand warming. -f/u Dr. Henry and Dr. Fonseca   back pain-not very active today -finds the epidurals helpful - PT script given -difficult for her to take her tizanidine as it makes her sleepy   Right shoulder limited ROM- not an active issue today -exercises to prevent adhesive capsulitis   Left shoulder pain/impingement- not active. -Injected right SAB 3/6/20 with 80mg depomedrol. -80mg depomedrol administered to left SAB 12/4/18- didn't help too much    Knee OA- stable -Injected left knee 3/6/20 with 80mg depomedrol after aspirating 8 cc of fluid from left knee. -She prefers having these done under image guidance. She has these done with ortho.   Vit D deficiency/clinical osteoporosis -Completed 50,000IU q week x 4 weeks. currently back on 2000IU daily. DEXA 11/3/23 shows T score -2.2. Compression fx L1 noted, f/u XR did not see any fracture. -DEXA 2020 shows osteopenia. With her tibia fracture, she has diagnosis of clinical osteoporosis. -prolia given 10/25/24, next due after 4/26/25 -Labs today   rpa 6 months.

## 2025-01-12 NOTE — PHYSICAL EXAM
[TextEntry] : Constitutional: Alert and in no acute distress. Eyes: Sclera and conjunctiva were normal, pupils were equal in size, round, and reactive. Extraocular movements were in tact. ENT: Ears, and nose were normal in appearance. No oral sores. No thrush. Neck: The appearance of the neck was normal, no neck mass was observed, the thyroid was not enlarged and there were no palpable thyroid nodules. Pulmonary: crackles b/L Cardiac: Heart rate was normal and rhythm regular, normal S1 and S2, no gallops, no murmurs, no pericardial rub. Vascular: The pedal pulses are present. There was no peripheral edema. No color changes in the digits. Abd: Normal bowel sounds. Soft, non-tender, non distended, no hepato-splenomegaly. Musculoskeletal: Normal gait, no clubbing or cyanosis of the fingernails, and muscle strength and tone were normal. Crepitus b/L knees, no knee effusion. Left knee able to flex/extend without difficulty. Noted significant scoliosis.  +TTP b/L 1st MCPS Hands no active synovitis

## 2025-01-12 NOTE — HISTORY OF PRESENT ILLNESS
[FreeTextEntry1] : 83 y/o woman with hx of GERD, CHF, COPD/asthmatic component, borderline elevated HTN, HLD on crestor, degenerative disc disease of spine, paralytic sleep and attention deficit, left knee fracture, here for f/u of her inflammatory arthritis. I am treating her for seronegative RA/with +RNP/-MARISSA. She is on plaquenil 200mg daily and was previously on rituxan.  She had been happy with the PLQ/Rituxan combination. Last Rituxan was 6/29/21 6/2023 last eye exam, all good. Had eye appt 12/2024 but had to cancel it. Will reschedule.    knees doing well- gets HA injections with ortho. am stiffness not too bad.  MCPs TTP b/L No falls or fractures Due for BW today.  Prolia is due 4/2025 No dental procedures planned. No new fracturesSeeing dr parrish for dizziness tomorrow.   .  She has hx of pain in left lower back. Epidural with Dr. Cross done previously which was helpful.  Advised by Dr. Fuchs not to take naproxen due to hx of erosive gastritis  She had CT chest 11/05/20 which shows bronchiectasis, few ill-defined opacities in peripheral aspect, scattered tree in bud opacities within lungs. She hasn't f/u with pulm, but was given inhaler by her cardiologist.   Cancer screening:  Last mammogram 12/21/2020 negative EGD 1/19/18 and 3/1/23 Erosive gastritis Colonoscopy 3/1/2023: normal.

## 2025-01-13 LAB
25(OH)D3 SERPL-MCNC: 42.2 NG/ML
ALBUMIN SERPL ELPH-MCNC: 4.3 G/DL
ALP BLD-CCNC: 91 U/L
ALT SERPL-CCNC: 13 U/L
ANION GAP SERPL CALC-SCNC: 16 MMOL/L
AST SERPL-CCNC: 21 U/L
BASOPHILS # BLD AUTO: 0.11 K/UL
BASOPHILS NFR BLD AUTO: 1.2 %
BILIRUB SERPL-MCNC: 0.4 MG/DL
BUN SERPL-MCNC: 24 MG/DL
CALCIUM SERPL-MCNC: 9.6 MG/DL
CHLORIDE SERPL-SCNC: 103 MMOL/L
CO2 SERPL-SCNC: 22 MMOL/L
CREAT SERPL-MCNC: 0.97 MG/DL
CRP SERPL-MCNC: 3 MG/L
EGFR: 58 ML/MIN/1.73M2
EOSINOPHIL # BLD AUTO: 0.33 K/UL
EOSINOPHIL NFR BLD AUTO: 3.7 %
ERYTHROCYTE [SEDIMENTATION RATE] IN BLOOD BY WESTERGREN METHOD: 29 MM/HR
GLUCOSE SERPL-MCNC: 87 MG/DL
HCT VFR BLD CALC: 40.4 %
HGB BLD-MCNC: 12.7 G/DL
HYDROXYCHLOROQUINE CONCENTRATION: 1251.5 NG/ML
IMM GRANULOCYTES NFR BLD AUTO: 0.3 %
LYMPHOCYTES # BLD AUTO: 1.77 K/UL
LYMPHOCYTES NFR BLD AUTO: 19.9 %
MAN DIFF?: NORMAL
MCHC RBC-ENTMCNC: 29.7 PG
MCHC RBC-ENTMCNC: 31.4 G/DL
MCV RBC AUTO: 94.4 FL
MONOCYTES # BLD AUTO: 0.51 K/UL
MONOCYTES NFR BLD AUTO: 5.7 %
NEUTROPHILS # BLD AUTO: 6.14 K/UL
NEUTROPHILS NFR BLD AUTO: 69.2 %
PLATELET # BLD AUTO: 273 K/UL
POTASSIUM SERPL-SCNC: 5.2 MMOL/L
PROT SERPL-MCNC: 7.1 G/DL
RBC # BLD: 4.28 M/UL
RBC # FLD: 13.5 %
SODIUM SERPL-SCNC: 140 MMOL/L
WBC # FLD AUTO: 8.89 K/UL

## 2025-01-14 RX ORDER — DENOSUMAB 60 MG/ML
60 INJECTION SUBCUTANEOUS
Qty: 1 | Refills: 0 | Status: ACTIVE | COMMUNITY
Start: 2025-01-12

## 2025-01-22 ENCOUNTER — APPOINTMENT (OUTPATIENT)
Dept: DERMATOLOGY | Facility: CLINIC | Age: 85
End: 2025-01-22

## 2025-04-29 ENCOUNTER — APPOINTMENT (OUTPATIENT)
Dept: RHEUMATOLOGY | Facility: CLINIC | Age: 85
End: 2025-04-29
Payer: MEDICARE

## 2025-04-29 VITALS
DIASTOLIC BLOOD PRESSURE: 63 MMHG | HEART RATE: 90 BPM | RESPIRATION RATE: 18 BRPM | OXYGEN SATURATION: 94 % | SYSTOLIC BLOOD PRESSURE: 107 MMHG | TEMPERATURE: 98 F

## 2025-04-29 PROCEDURE — 96372 THER/PROPH/DIAG INJ SC/IM: CPT

## 2025-04-30 RX ORDER — DENOSUMAB 60 MG/ML
60 INJECTION SUBCUTANEOUS
Qty: 1 | Refills: 0 | Status: COMPLETED | OUTPATIENT
Start: 2025-04-24

## 2025-06-10 ENCOUNTER — APPOINTMENT (OUTPATIENT)
Dept: RHEUMATOLOGY | Facility: CLINIC | Age: 85
End: 2025-06-10
Payer: MEDICARE

## 2025-06-10 ENCOUNTER — NON-APPOINTMENT (OUTPATIENT)
Age: 85
End: 2025-06-10

## 2025-06-10 VITALS
HEART RATE: 93 BPM | OXYGEN SATURATION: 97 % | TEMPERATURE: 97.5 F | DIASTOLIC BLOOD PRESSURE: 78 MMHG | SYSTOLIC BLOOD PRESSURE: 119 MMHG | BODY MASS INDEX: 17.75 KG/M2 | HEIGHT: 61 IN | WEIGHT: 94 LBS

## 2025-06-10 PROCEDURE — 99214 OFFICE O/P EST MOD 30 MIN: CPT

## 2025-06-10 RX ORDER — BUDESONIDE, GLYCOPYRROLATE, AND FORMOTEROL FUMARATE 160; 9; 4.8 UG/1; UG/1; UG/1
AEROSOL, METERED RESPIRATORY (INHALATION)
Refills: 0 | Status: ACTIVE | COMMUNITY

## 2025-06-10 RX ORDER — ASPIRIN 81 MG
81 TABLET, DELAYED RELEASE (ENTERIC COATED) ORAL
Refills: 0 | Status: ACTIVE | COMMUNITY

## 2025-07-03 ENCOUNTER — OFFICE (OUTPATIENT)
Dept: URBAN - METROPOLITAN AREA CLINIC 102 | Facility: CLINIC | Age: 85
Setting detail: OPHTHALMOLOGY
End: 2025-07-03
Payer: MEDICARE

## 2025-07-03 VITALS — HEIGHT: 55 IN

## 2025-07-03 DIAGNOSIS — Z79.891: ICD-10-CM

## 2025-07-03 DIAGNOSIS — H16.223: ICD-10-CM

## 2025-07-03 DIAGNOSIS — H16.221: ICD-10-CM

## 2025-07-03 DIAGNOSIS — H25.13: ICD-10-CM

## 2025-07-03 DIAGNOSIS — H43.813: ICD-10-CM

## 2025-07-03 DIAGNOSIS — H40.033: ICD-10-CM

## 2025-07-03 DIAGNOSIS — H16.222: ICD-10-CM

## 2025-07-03 PROCEDURE — 92020 GONIOSCOPY: CPT | Performed by: OPHTHALMOLOGY

## 2025-07-03 PROCEDURE — 92134 CPTRZ OPH DX IMG PST SGM RTA: CPT | Performed by: OPHTHALMOLOGY

## 2025-07-03 PROCEDURE — 92083 EXTENDED VISUAL FIELD XM: CPT | Performed by: OPHTHALMOLOGY

## 2025-07-03 PROCEDURE — 92014 COMPRE OPH EXAM EST PT 1/>: CPT | Performed by: OPHTHALMOLOGY

## 2025-07-03 ASSESSMENT — KERATOMETRY
OD_K2POWER_DIOPTERS: 44.50
OD_AXISANGLE_DEGREES: 163
OD_K1POWER_DIOPTERS: 44.00
OS_K1POWER_DIOPTERS: 44.25
METHOD_AUTO_MANUAL: AUTO
OS_K2POWER_DIOPTERS: 45.00
OS_AXISANGLE_DEGREES: 73

## 2025-07-03 ASSESSMENT — VISUAL ACUITY
OD_BCVA: 20/40-2
OS_BCVA: 20/40-1

## 2025-07-03 ASSESSMENT — REFRACTION_CURRENTRX
OS_AXIS: 090
OS_CYLINDER: -1.00
OS_VPRISM_DIRECTION: PROGS
OD_VPRISM_DIRECTION: PROGS
OD_CYLINDER: -0.75
OS_OVR_VA: 20/
OS_SPHERE: +1.50
OS_ADD: +3.25
OD_AXIS: 067
OS_OVR_VA: 20/
OD_OVR_VA: 20/
OD_OVR_VA: 20/
OD_SPHERE: +1.25
OD_ADD: +3.25

## 2025-07-03 ASSESSMENT — REFRACTION_MANIFEST
OS_CYLINDER: -1.00
OS_SPHERE: +0.50
OD_AXIS: 85
OS_AXIS: 106
OS_VA1: 20/30
OD_VA1: 20/30-2
OD_SPHERE: +1.75
OD_CYLINDER: -1.50

## 2025-07-03 ASSESSMENT — REFRACTION_AUTOREFRACTION
OD_AXIS: 85
OS_SPHERE: +0.50
OD_SPHERE: +1.75
OD_CYLINDER: -1.50
OS_AXIS: 106
OS_CYLINDER: -1.00

## 2025-07-03 ASSESSMENT — TONOMETRY
OD_IOP_MMHG: 11
OS_IOP_MMHG: 12

## 2025-07-03 ASSESSMENT — CONFRONTATIONAL VISUAL FIELD TEST (CVF)
OD_FINDINGS: FULL
OS_FINDINGS: FULL

## 2025-07-19 ENCOUNTER — RESULT REVIEW (OUTPATIENT)
Age: 85
End: 2025-07-19

## 2025-07-19 ENCOUNTER — INPATIENT (INPATIENT)
Facility: HOSPITAL | Age: 85
LOS: 2 days | Discharge: HOME | DRG: 286 | End: 2025-07-22
Attending: STUDENT IN AN ORGANIZED HEALTH CARE EDUCATION/TRAINING PROGRAM | Admitting: INTERNAL MEDICINE
Payer: MEDICARE

## 2025-07-19 VITALS — WEIGHT: 139.99 LBS

## 2025-07-19 DIAGNOSIS — J96.01 ACUTE RESPIRATORY FAILURE WITH HYPOXIA: ICD-10-CM

## 2025-07-19 DIAGNOSIS — Z98.890 OTHER SPECIFIED POSTPROCEDURAL STATES: Chronic | ICD-10-CM

## 2025-07-19 DIAGNOSIS — Z90.89 ACQUIRED ABSENCE OF OTHER ORGANS: Chronic | ICD-10-CM

## 2025-07-19 LAB
ALBUMIN SERPL ELPH-MCNC: 3.6 G/DL — SIGNIFICANT CHANGE UP (ref 3.3–5)
ALP SERPL-CCNC: 80 U/L — SIGNIFICANT CHANGE UP (ref 40–120)
ALT FLD-CCNC: 53 U/L — SIGNIFICANT CHANGE UP (ref 12–78)
ANION GAP SERPL CALC-SCNC: 5 MMOL/L — SIGNIFICANT CHANGE UP (ref 5–17)
ANION GAP SERPL CALC-SCNC: 8 MMOL/L — SIGNIFICANT CHANGE UP (ref 5–17)
APPEARANCE UR: CLEAR — SIGNIFICANT CHANGE UP
APTT BLD: 33 SEC — SIGNIFICANT CHANGE UP (ref 26.1–36.8)
APTT BLD: 52 SEC — HIGH (ref 26.1–36.8)
AST SERPL-CCNC: 66 U/L — HIGH (ref 15–37)
BACTERIA # UR AUTO: NEGATIVE /HPF — SIGNIFICANT CHANGE UP
BASE EXCESS BLDV CALC-SCNC: -6 MMOL/L — LOW (ref -2–3)
BASE EXCESS BLDV CALC-SCNC: -9.7 MMOL/L — LOW (ref -2–3)
BASOPHILS # BLD AUTO: 0.16 K/UL — SIGNIFICANT CHANGE UP (ref 0–0.2)
BASOPHILS NFR BLD AUTO: 1.2 % — SIGNIFICANT CHANGE UP (ref 0–2)
BILIRUB SERPL-MCNC: 0.4 MG/DL — SIGNIFICANT CHANGE UP (ref 0.2–1.2)
BILIRUB UR-MCNC: NEGATIVE — SIGNIFICANT CHANGE UP
BUN SERPL-MCNC: 23 MG/DL — SIGNIFICANT CHANGE UP (ref 7–23)
BUN SERPL-MCNC: 26 MG/DL — HIGH (ref 7–23)
CALCIUM SERPL-MCNC: 8.7 MG/DL — SIGNIFICANT CHANGE UP (ref 8.5–10.1)
CALCIUM SERPL-MCNC: 9 MG/DL — SIGNIFICANT CHANGE UP (ref 8.5–10.1)
CAST: 4 /LPF — SIGNIFICANT CHANGE UP (ref 0–4)
CHLORIDE SERPL-SCNC: 108 MMOL/L — SIGNIFICANT CHANGE UP (ref 96–108)
CHLORIDE SERPL-SCNC: 109 MMOL/L — HIGH (ref 96–108)
CO2 SERPL-SCNC: 22 MMOL/L — SIGNIFICANT CHANGE UP (ref 22–31)
CO2 SERPL-SCNC: 28 MMOL/L — SIGNIFICANT CHANGE UP (ref 22–31)
COLOR SPEC: YELLOW — SIGNIFICANT CHANGE UP
COMMENT - URINE: SIGNIFICANT CHANGE UP
CREAT SERPL-MCNC: 1 MG/DL — SIGNIFICANT CHANGE UP (ref 0.5–1.3)
CREAT SERPL-MCNC: 1.03 MG/DL — SIGNIFICANT CHANGE UP (ref 0.5–1.3)
DIFF PNL FLD: NEGATIVE — SIGNIFICANT CHANGE UP
EGFR: 54 ML/MIN/1.73M2 — LOW
EGFR: 54 ML/MIN/1.73M2 — LOW
EGFR: 56 ML/MIN/1.73M2 — LOW
EGFR: 56 ML/MIN/1.73M2 — LOW
EOSINOPHIL # BLD AUTO: 0.46 K/UL — SIGNIFICANT CHANGE UP (ref 0–0.5)
EOSINOPHIL NFR BLD AUTO: 3.3 % — SIGNIFICANT CHANGE UP (ref 0–6)
ERYTHROCYTE [SEDIMENTATION RATE] IN BLOOD: 10 MM/HR — SIGNIFICANT CHANGE UP (ref 0–20)
FLUAV AG NPH QL: SIGNIFICANT CHANGE UP
FLUBV AG NPH QL: SIGNIFICANT CHANGE UP
GAS PNL BLDV: SIGNIFICANT CHANGE UP
GAS PNL BLDV: SIGNIFICANT CHANGE UP
GLUCOSE SERPL-MCNC: 301 MG/DL — HIGH (ref 70–99)
GLUCOSE SERPL-MCNC: 97 MG/DL — SIGNIFICANT CHANGE UP (ref 70–99)
GLUCOSE UR QL: NEGATIVE MG/DL — SIGNIFICANT CHANGE UP
HCO3 BLDV-SCNC: 22 MMOL/L — SIGNIFICANT CHANGE UP (ref 22–29)
HCO3 BLDV-SCNC: 24 MMOL/L — SIGNIFICANT CHANGE UP (ref 22–29)
HCT VFR BLD CALC: 40.8 % — SIGNIFICANT CHANGE UP (ref 34.5–45)
HCT VFR BLD CALC: 45.9 % — HIGH (ref 34.5–45)
HGB BLD-MCNC: 13 G/DL — SIGNIFICANT CHANGE UP (ref 11.5–15.5)
HGB BLD-MCNC: 13.9 G/DL — SIGNIFICANT CHANGE UP (ref 11.5–15.5)
IMM GRANULOCYTES # BLD AUTO: 0.04 K/UL — SIGNIFICANT CHANGE UP (ref 0–0.07)
IMM GRANULOCYTES NFR BLD AUTO: 0.3 % — SIGNIFICANT CHANGE UP (ref 0–0.9)
INR BLD: 0.98 RATIO — SIGNIFICANT CHANGE UP (ref 0.85–1.16)
KETONES UR QL: NEGATIVE MG/DL — SIGNIFICANT CHANGE UP
LACTATE SERPL-SCNC: 2.3 MMOL/L — HIGH (ref 0.7–2)
LACTATE SERPL-SCNC: 2.8 MMOL/L — HIGH (ref 0.7–2)
LACTATE SERPL-SCNC: 4.1 MMOL/L — CRITICAL HIGH (ref 0.7–2)
LEUKOCYTE ESTERASE UR-ACNC: NEGATIVE — SIGNIFICANT CHANGE UP
LIDOCAIN IGE QN: 33 U/L — SIGNIFICANT CHANGE UP (ref 13–75)
LYMPHOCYTES # BLD AUTO: 4.32 K/UL — HIGH (ref 1–3.3)
LYMPHOCYTES NFR BLD AUTO: 31.1 % — SIGNIFICANT CHANGE UP (ref 13–44)
MAGNESIUM SERPL-MCNC: 2.1 MG/DL — SIGNIFICANT CHANGE UP (ref 1.6–2.6)
MCHC RBC-ENTMCNC: 30 PG — SIGNIFICANT CHANGE UP (ref 27–34)
MCHC RBC-ENTMCNC: 30.2 PG — SIGNIFICANT CHANGE UP (ref 27–34)
MCHC RBC-ENTMCNC: 30.3 G/DL — LOW (ref 32–36)
MCHC RBC-ENTMCNC: 31.9 G/DL — LOW (ref 32–36)
MCV RBC AUTO: 94.2 FL — SIGNIFICANT CHANGE UP (ref 80–100)
MCV RBC AUTO: 99.6 FL — SIGNIFICANT CHANGE UP (ref 80–100)
MONOCYTES # BLD AUTO: 0.52 K/UL — SIGNIFICANT CHANGE UP (ref 0–0.9)
MONOCYTES NFR BLD AUTO: 3.7 % — SIGNIFICANT CHANGE UP (ref 2–14)
NEUTROPHILS # BLD AUTO: 8.38 K/UL — HIGH (ref 1.8–7.4)
NEUTROPHILS NFR BLD AUTO: 60.4 % — SIGNIFICANT CHANGE UP (ref 43–77)
NITRITE UR-MCNC: NEGATIVE — SIGNIFICANT CHANGE UP
NRBC # BLD AUTO: 0 K/UL — SIGNIFICANT CHANGE UP (ref 0–0)
NRBC # BLD AUTO: 0 K/UL — SIGNIFICANT CHANGE UP (ref 0–0)
NRBC # FLD: 0 K/UL — SIGNIFICANT CHANGE UP (ref 0–0)
NRBC # FLD: 0 K/UL — SIGNIFICANT CHANGE UP (ref 0–0)
NRBC BLD AUTO-RTO: 0 /100 WBCS — SIGNIFICANT CHANGE UP (ref 0–0)
NRBC BLD AUTO-RTO: 0 /100 WBCS — SIGNIFICANT CHANGE UP (ref 0–0)
NT-PROBNP SERPL-SCNC: 8490 PG/ML — HIGH (ref 0–450)
PCO2 BLDV: 67 MMHG — HIGH (ref 39–42)
PCO2 BLDV: 78 MMHG — CRITICAL HIGH (ref 39–42)
PH BLDV: 7.06 — CRITICAL LOW (ref 7.32–7.43)
PH BLDV: 7.16 — CRITICAL LOW (ref 7.32–7.43)
PH UR: 5.5 — SIGNIFICANT CHANGE UP (ref 5–8)
PHOSPHATE SERPL-MCNC: 4.3 MG/DL — SIGNIFICANT CHANGE UP (ref 2.5–4.5)
PLATELET # BLD AUTO: 230 K/UL — SIGNIFICANT CHANGE UP (ref 150–400)
PLATELET # BLD AUTO: 296 K/UL — SIGNIFICANT CHANGE UP (ref 150–400)
PMV BLD: 10.9 FL — SIGNIFICANT CHANGE UP (ref 7–13)
PMV BLD: 11.2 FL — SIGNIFICANT CHANGE UP (ref 7–13)
PO2 BLDV: 52 MMHG — HIGH (ref 25–45)
PO2 BLDV: 53 MMHG — HIGH (ref 25–45)
POTASSIUM SERPL-MCNC: 4 MMOL/L — SIGNIFICANT CHANGE UP (ref 3.5–5.3)
POTASSIUM SERPL-MCNC: 4.5 MMOL/L — SIGNIFICANT CHANGE UP (ref 3.5–5.3)
POTASSIUM SERPL-SCNC: 4 MMOL/L — SIGNIFICANT CHANGE UP (ref 3.5–5.3)
POTASSIUM SERPL-SCNC: 4.5 MMOL/L — SIGNIFICANT CHANGE UP (ref 3.5–5.3)
PROT SERPL-MCNC: 7.3 GM/DL — SIGNIFICANT CHANGE UP (ref 6–8.3)
PROT UR-MCNC: 30 MG/DL
PROTHROM AB SERPL-ACNC: 11.3 SEC — SIGNIFICANT CHANGE UP (ref 9.9–13.4)
RBC # BLD: 4.33 M/UL — SIGNIFICANT CHANGE UP (ref 3.8–5.2)
RBC # BLD: 4.61 M/UL — SIGNIFICANT CHANGE UP (ref 3.8–5.2)
RBC # FLD: 14 % — SIGNIFICANT CHANGE UP (ref 10.3–14.5)
RBC # FLD: 14.4 % — SIGNIFICANT CHANGE UP (ref 10.3–14.5)
RBC CASTS # UR COMP ASSIST: 2 /HPF — SIGNIFICANT CHANGE UP (ref 0–4)
RSV RNA NPH QL NAA+NON-PROBE: SIGNIFICANT CHANGE UP
SAO2 % BLDV: 71 % — SIGNIFICANT CHANGE UP (ref 67–88)
SAO2 % BLDV: 78 % — SIGNIFICANT CHANGE UP (ref 67–88)
SARS-COV-2 RNA SPEC QL NAA+PROBE: SIGNIFICANT CHANGE UP
SODIUM SERPL-SCNC: 139 MMOL/L — SIGNIFICANT CHANGE UP (ref 135–145)
SODIUM SERPL-SCNC: 141 MMOL/L — SIGNIFICANT CHANGE UP (ref 135–145)
SOURCE RESPIRATORY: SIGNIFICANT CHANGE UP
SP GR SPEC: 1.02 — SIGNIFICANT CHANGE UP (ref 1–1.03)
SQUAMOUS # UR AUTO: 1 /HPF — SIGNIFICANT CHANGE UP (ref 0–5)
TROPONIN I, HIGH SENSITIVITY RESULT: 35.23 NG/L — SIGNIFICANT CHANGE UP
TROPONIN I, HIGH SENSITIVITY RESULT: 534.74 NG/L — HIGH
UROBILINOGEN FLD QL: 0.2 MG/DL — SIGNIFICANT CHANGE UP (ref 0.2–1)
WBC # BLD: 13.88 K/UL — HIGH (ref 3.8–10.5)
WBC # BLD: 9.53 K/UL — SIGNIFICANT CHANGE UP (ref 3.8–10.5)
WBC # FLD AUTO: 13.88 K/UL — HIGH (ref 3.8–10.5)
WBC # FLD AUTO: 9.53 K/UL — SIGNIFICANT CHANGE UP (ref 3.8–10.5)
WBC UR QL: 2 /HPF — SIGNIFICANT CHANGE UP (ref 0–5)

## 2025-07-19 PROCEDURE — 93306 TTE W/DOPPLER COMPLETE: CPT | Mod: 26

## 2025-07-19 PROCEDURE — 84484 ASSAY OF TROPONIN QUANT: CPT

## 2025-07-19 PROCEDURE — 99291 CRITICAL CARE FIRST HOUR: CPT

## 2025-07-19 PROCEDURE — 85730 THROMBOPLASTIN TIME PARTIAL: CPT

## 2025-07-19 PROCEDURE — 97116 GAIT TRAINING THERAPY: CPT | Mod: GP

## 2025-07-19 PROCEDURE — 86038 ANTINUCLEAR ANTIBODIES: CPT

## 2025-07-19 PROCEDURE — C1769: CPT

## 2025-07-19 PROCEDURE — 94640 AIRWAY INHALATION TREATMENT: CPT

## 2025-07-19 PROCEDURE — 86141 C-REACTIVE PROTEIN HS: CPT

## 2025-07-19 PROCEDURE — C1894: CPT

## 2025-07-19 PROCEDURE — 80048 BASIC METABOLIC PNL TOTAL CA: CPT

## 2025-07-19 PROCEDURE — 84443 ASSAY THYROID STIM HORMONE: CPT

## 2025-07-19 PROCEDURE — C1887: CPT

## 2025-07-19 PROCEDURE — 84100 ASSAY OF PHOSPHORUS: CPT

## 2025-07-19 PROCEDURE — 83735 ASSAY OF MAGNESIUM: CPT

## 2025-07-19 PROCEDURE — 83605 ASSAY OF LACTIC ACID: CPT

## 2025-07-19 PROCEDURE — 36415 COLL VENOUS BLD VENIPUNCTURE: CPT

## 2025-07-19 PROCEDURE — 85025 COMPLETE CBC W/AUTO DIFF WBC: CPT

## 2025-07-19 PROCEDURE — 83036 HEMOGLOBIN GLYCOSYLATED A1C: CPT

## 2025-07-19 PROCEDURE — 93458 L HRT ARTERY/VENTRICLE ANGIO: CPT

## 2025-07-19 PROCEDURE — 80053 COMPREHEN METABOLIC PANEL: CPT

## 2025-07-19 PROCEDURE — 87640 STAPH A DNA AMP PROBE: CPT

## 2025-07-19 PROCEDURE — 85652 RBC SED RATE AUTOMATED: CPT

## 2025-07-19 PROCEDURE — 85027 COMPLETE CBC AUTOMATED: CPT

## 2025-07-19 PROCEDURE — 71045 X-RAY EXAM CHEST 1 VIEW: CPT | Mod: 26

## 2025-07-19 PROCEDURE — 87641 MR-STAPH DNA AMP PROBE: CPT

## 2025-07-19 PROCEDURE — 93010 ELECTROCARDIOGRAM REPORT: CPT

## 2025-07-19 PROCEDURE — 83880 ASSAY OF NATRIURETIC PEPTIDE: CPT

## 2025-07-19 PROCEDURE — 97163 PT EVAL HIGH COMPLEX 45 MIN: CPT | Mod: GP

## 2025-07-19 PROCEDURE — 93005 ELECTROCARDIOGRAM TRACING: CPT

## 2025-07-19 RX ORDER — HEPARIN SODIUM 1000 [USP'U]/ML
INJECTION INTRAVENOUS; SUBCUTANEOUS
Qty: 25000 | Refills: 0 | Status: DISCONTINUED | OUTPATIENT
Start: 2025-07-19 | End: 2025-07-19

## 2025-07-19 RX ORDER — FUROSEMIDE 10 MG/ML
40 INJECTION INTRAMUSCULAR; INTRAVENOUS ONCE
Refills: 0 | Status: COMPLETED | OUTPATIENT
Start: 2025-07-19 | End: 2025-07-19

## 2025-07-19 RX ORDER — HYDROXYCHLOROQUINE SULFATE 200 MG/1
200 TABLET, FILM COATED ORAL
Refills: 0 | Status: DISCONTINUED | OUTPATIENT
Start: 2025-07-19 | End: 2025-07-19

## 2025-07-19 RX ORDER — IPRATROPIUM BROMIDE AND ALBUTEROL SULFATE .5; 2.5 MG/3ML; MG/3ML
3 SOLUTION RESPIRATORY (INHALATION) ONCE
Refills: 0 | Status: COMPLETED | OUTPATIENT
Start: 2025-07-19 | End: 2025-07-19

## 2025-07-19 RX ORDER — METHYLPREDNISOLONE ACETATE 80 MG/ML
125 INJECTION, SUSPENSION INTRA-ARTICULAR; INTRALESIONAL; INTRAMUSCULAR; SOFT TISSUE ONCE
Refills: 0 | Status: COMPLETED | OUTPATIENT
Start: 2025-07-19 | End: 2025-07-19

## 2025-07-19 RX ORDER — ATORVASTATIN CALCIUM 80 MG/1
20 TABLET, FILM COATED ORAL AT BEDTIME
Refills: 0 | Status: DISCONTINUED | OUTPATIENT
Start: 2025-07-19 | End: 2025-07-22

## 2025-07-19 RX ORDER — HEPARIN SODIUM 1000 [USP'U]/ML
INJECTION INTRAVENOUS; SUBCUTANEOUS
Qty: 25000 | Refills: 0 | Status: DISCONTINUED | OUTPATIENT
Start: 2025-07-19 | End: 2025-07-21

## 2025-07-19 RX ORDER — CLOPIDOGREL BISULFATE 75 MG/1
75 TABLET, FILM COATED ORAL DAILY
Refills: 0 | Status: DISCONTINUED | OUTPATIENT
Start: 2025-07-19 | End: 2025-07-22

## 2025-07-19 RX ORDER — HEPARIN SODIUM 1000 [USP'U]/ML
3800 INJECTION INTRAVENOUS; SUBCUTANEOUS EVERY 6 HOURS
Refills: 0 | Status: DISCONTINUED | OUTPATIENT
Start: 2025-07-19 | End: 2025-07-19

## 2025-07-19 RX ORDER — HEPARIN SODIUM 1000 [USP'U]/ML
4000 INJECTION INTRAVENOUS; SUBCUTANEOUS ONCE
Refills: 0 | Status: DISCONTINUED | OUTPATIENT
Start: 2025-07-19 | End: 2025-07-19

## 2025-07-19 RX ORDER — HEPARIN SODIUM 1000 [USP'U]/ML
4000 INJECTION INTRAVENOUS; SUBCUTANEOUS EVERY 6 HOURS
Refills: 0 | Status: DISCONTINUED | OUTPATIENT
Start: 2025-07-19 | End: 2025-07-19

## 2025-07-19 RX ORDER — HEPARIN SODIUM 1000 [USP'U]/ML
2000 INJECTION INTRAVENOUS; SUBCUTANEOUS EVERY 6 HOURS
Refills: 0 | Status: DISCONTINUED | OUTPATIENT
Start: 2025-07-19 | End: 2025-07-19

## 2025-07-19 RX ORDER — HYDROXYCHLOROQUINE SULFATE 200 MG/1
200 TABLET, FILM COATED ORAL DAILY
Refills: 0 | Status: DISCONTINUED | OUTPATIENT
Start: 2025-07-19 | End: 2025-07-22

## 2025-07-19 RX ORDER — TIZANIDINE 4 MG/1
2 TABLET ORAL ONCE
Refills: 0 | Status: COMPLETED | OUTPATIENT
Start: 2025-07-19 | End: 2025-07-19

## 2025-07-19 RX ORDER — TIOTROPIUM BROMIDE INHALATION SPRAY 3.12 UG/1
2 SPRAY, METERED RESPIRATORY (INHALATION) DAILY
Refills: 0 | Status: DISCONTINUED | OUTPATIENT
Start: 2025-07-19 | End: 2025-07-22

## 2025-07-19 RX ORDER — HEPARIN SODIUM 1000 [USP'U]/ML
2900 INJECTION INTRAVENOUS; SUBCUTANEOUS ONCE
Refills: 0 | Status: COMPLETED | OUTPATIENT
Start: 2025-07-19 | End: 2025-07-19

## 2025-07-19 RX ORDER — ASPIRIN 325 MG
81 TABLET ORAL DAILY
Refills: 0 | Status: DISCONTINUED | OUTPATIENT
Start: 2025-07-19 | End: 2025-07-22

## 2025-07-19 RX ORDER — HEPARIN SODIUM 1000 [USP'U]/ML
2900 INJECTION INTRAVENOUS; SUBCUTANEOUS EVERY 6 HOURS
Refills: 0 | Status: DISCONTINUED | OUTPATIENT
Start: 2025-07-19 | End: 2025-07-21

## 2025-07-19 RX ADMIN — CLOPIDOGREL BISULFATE 75 MILLIGRAM(S): 75 TABLET, FILM COATED ORAL at 15:02

## 2025-07-19 RX ADMIN — HEPARIN SODIUM 700 UNIT(S)/HR: 1000 INJECTION INTRAVENOUS; SUBCUTANEOUS at 22:25

## 2025-07-19 RX ADMIN — HEPARIN SODIUM 600 UNIT(S)/HR: 1000 INJECTION INTRAVENOUS; SUBCUTANEOUS at 15:29

## 2025-07-19 RX ADMIN — IPRATROPIUM BROMIDE AND ALBUTEROL SULFATE 3 MILLILITER(S): .5; 2.5 SOLUTION RESPIRATORY (INHALATION) at 10:51

## 2025-07-19 RX ADMIN — FUROSEMIDE 40 MILLIGRAM(S): 10 INJECTION INTRAMUSCULAR; INTRAVENOUS at 17:23

## 2025-07-19 RX ADMIN — METHYLPREDNISOLONE ACETATE 125 MILLIGRAM(S): 80 INJECTION, SUSPENSION INTRA-ARTICULAR; INTRALESIONAL; INTRAMUSCULAR; SOFT TISSUE at 10:54

## 2025-07-19 RX ADMIN — HEPARIN SODIUM 2900 UNIT(S): 1000 INJECTION INTRAVENOUS; SUBCUTANEOUS at 15:28

## 2025-07-19 RX ADMIN — IPRATROPIUM BROMIDE AND ALBUTEROL SULFATE 3 MILLILITER(S): .5; 2.5 SOLUTION RESPIRATORY (INHALATION) at 10:50

## 2025-07-19 RX ADMIN — Medication 81 MILLIGRAM(S): at 14:50

## 2025-07-19 RX ADMIN — FUROSEMIDE 40 MILLIGRAM(S): 10 INJECTION INTRAMUSCULAR; INTRAVENOUS at 10:54

## 2025-07-19 RX ADMIN — ATORVASTATIN CALCIUM 20 MILLIGRAM(S): 80 TABLET, FILM COATED ORAL at 21:35

## 2025-07-19 RX ADMIN — HEPARIN SODIUM 600 UNIT(S)/HR: 1000 INJECTION INTRAVENOUS; SUBCUTANEOUS at 17:23

## 2025-07-20 LAB
A1C WITH ESTIMATED AVERAGE GLUCOSE RESULT: 5.8 % — HIGH (ref 4–5.6)
ALBUMIN SERPL ELPH-MCNC: 3 G/DL — LOW (ref 3.3–5)
ALP SERPL-CCNC: 69 U/L — SIGNIFICANT CHANGE UP (ref 40–120)
ALT FLD-CCNC: 41 U/L — SIGNIFICANT CHANGE UP (ref 12–78)
ANION GAP SERPL CALC-SCNC: 6 MMOL/L — SIGNIFICANT CHANGE UP (ref 5–17)
APTT BLD: 49.1 SEC — HIGH (ref 26.1–36.8)
APTT BLD: 51.3 SEC — HIGH (ref 26.1–36.8)
APTT BLD: 56 SEC — HIGH (ref 26.1–36.8)
AST SERPL-CCNC: 42 U/L — HIGH (ref 15–37)
BASOPHILS # BLD AUTO: 0.02 K/UL — SIGNIFICANT CHANGE UP (ref 0–0.2)
BASOPHILS NFR BLD AUTO: 0.2 % — SIGNIFICANT CHANGE UP (ref 0–2)
BILIRUB SERPL-MCNC: 0.5 MG/DL — SIGNIFICANT CHANGE UP (ref 0.2–1.2)
BUN SERPL-MCNC: 28 MG/DL — HIGH (ref 7–23)
CALCIUM SERPL-MCNC: 7.9 MG/DL — LOW (ref 8.5–10.1)
CHLORIDE SERPL-SCNC: 108 MMOL/L — SIGNIFICANT CHANGE UP (ref 96–108)
CO2 SERPL-SCNC: 27 MMOL/L — SIGNIFICANT CHANGE UP (ref 22–31)
CREAT SERPL-MCNC: 0.94 MG/DL — SIGNIFICANT CHANGE UP (ref 0.5–1.3)
EGFR: 60 ML/MIN/1.73M2 — SIGNIFICANT CHANGE UP
EGFR: 60 ML/MIN/1.73M2 — SIGNIFICANT CHANGE UP
EOSINOPHIL # BLD AUTO: 0.01 K/UL — SIGNIFICANT CHANGE UP (ref 0–0.5)
EOSINOPHIL NFR BLD AUTO: 0.1 % — SIGNIFICANT CHANGE UP (ref 0–6)
ESTIMATED AVERAGE GLUCOSE: 120 MG/DL — HIGH (ref 68–114)
GLUCOSE SERPL-MCNC: 98 MG/DL — SIGNIFICANT CHANGE UP (ref 70–99)
HCT VFR BLD CALC: 35.9 % — SIGNIFICANT CHANGE UP (ref 34.5–45)
HGB BLD-MCNC: 11.6 G/DL — SIGNIFICANT CHANGE UP (ref 11.5–15.5)
IMM GRANULOCYTES # BLD AUTO: 0.02 K/UL — SIGNIFICANT CHANGE UP (ref 0–0.07)
IMM GRANULOCYTES NFR BLD AUTO: 0.2 % — SIGNIFICANT CHANGE UP (ref 0–0.9)
LYMPHOCYTES # BLD AUTO: 1.28 K/UL — SIGNIFICANT CHANGE UP (ref 1–3.3)
LYMPHOCYTES NFR BLD AUTO: 13.6 % — SIGNIFICANT CHANGE UP (ref 13–44)
MAGNESIUM SERPL-MCNC: 1.8 MG/DL — SIGNIFICANT CHANGE UP (ref 1.6–2.6)
MCHC RBC-ENTMCNC: 30.2 PG — SIGNIFICANT CHANGE UP (ref 27–34)
MCHC RBC-ENTMCNC: 32.3 G/DL — SIGNIFICANT CHANGE UP (ref 32–36)
MCV RBC AUTO: 93.5 FL — SIGNIFICANT CHANGE UP (ref 80–100)
MONOCYTES # BLD AUTO: 0.55 K/UL — SIGNIFICANT CHANGE UP (ref 0–0.9)
MONOCYTES NFR BLD AUTO: 5.8 % — SIGNIFICANT CHANGE UP (ref 2–14)
MRSA PCR RESULT.: SIGNIFICANT CHANGE UP
NEUTROPHILS # BLD AUTO: 7.55 K/UL — HIGH (ref 1.8–7.4)
NEUTROPHILS NFR BLD AUTO: 80.1 % — HIGH (ref 43–77)
NRBC # BLD AUTO: 0 K/UL — SIGNIFICANT CHANGE UP (ref 0–0)
NRBC # FLD: 0 K/UL — SIGNIFICANT CHANGE UP (ref 0–0)
NRBC BLD AUTO-RTO: 0 /100 WBCS — SIGNIFICANT CHANGE UP (ref 0–0)
PHOSPHATE SERPL-MCNC: 3.7 MG/DL — SIGNIFICANT CHANGE UP (ref 2.5–4.5)
PLATELET # BLD AUTO: 207 K/UL — SIGNIFICANT CHANGE UP (ref 150–400)
PMV BLD: 10.7 FL — SIGNIFICANT CHANGE UP (ref 7–13)
POTASSIUM SERPL-MCNC: 3.6 MMOL/L — SIGNIFICANT CHANGE UP (ref 3.5–5.3)
POTASSIUM SERPL-SCNC: 3.6 MMOL/L — SIGNIFICANT CHANGE UP (ref 3.5–5.3)
PROT SERPL-MCNC: 5.9 GM/DL — LOW (ref 6–8.3)
RBC # BLD: 3.84 M/UL — SIGNIFICANT CHANGE UP (ref 3.8–5.2)
RBC # FLD: 14.3 % — SIGNIFICANT CHANGE UP (ref 10.3–14.5)
S AUREUS DNA NOSE QL NAA+PROBE: SIGNIFICANT CHANGE UP
SODIUM SERPL-SCNC: 141 MMOL/L — SIGNIFICANT CHANGE UP (ref 135–145)
TROPONIN I, HIGH SENSITIVITY RESULT: 713.45 NG/L — HIGH
TSH SERPL-MCNC: 0.92 UU/ML — SIGNIFICANT CHANGE UP (ref 0.34–4.82)
WBC # BLD: 9.43 K/UL — SIGNIFICANT CHANGE UP (ref 3.8–10.5)
WBC # FLD AUTO: 9.43 K/UL — SIGNIFICANT CHANGE UP (ref 3.8–10.5)

## 2025-07-20 PROCEDURE — 99233 SBSQ HOSP IP/OBS HIGH 50: CPT

## 2025-07-20 RX ORDER — MAGNESIUM SULFATE 500 MG/ML
2 SYRINGE (ML) INJECTION ONCE
Refills: 0 | Status: COMPLETED | OUTPATIENT
Start: 2025-07-20 | End: 2025-07-20

## 2025-07-20 RX ORDER — TIZANIDINE 4 MG/1
2 TABLET ORAL THREE TIMES A DAY
Refills: 0 | Status: DISCONTINUED | OUTPATIENT
Start: 2025-07-20 | End: 2025-07-22

## 2025-07-20 RX ADMIN — Medication 81 MILLIGRAM(S): at 10:01

## 2025-07-20 RX ADMIN — Medication 1 DOSE(S): at 09:32

## 2025-07-20 RX ADMIN — TIOTROPIUM BROMIDE INHALATION SPRAY 2 PUFF(S): 3.12 SPRAY, METERED RESPIRATORY (INHALATION) at 09:32

## 2025-07-20 RX ADMIN — Medication 40 MILLIEQUIVALENT(S): at 10:57

## 2025-07-20 RX ADMIN — HEPARIN SODIUM 700 UNIT(S)/HR: 1000 INJECTION INTRAVENOUS; SUBCUTANEOUS at 05:10

## 2025-07-20 RX ADMIN — HEPARIN SODIUM 700 UNIT(S)/HR: 1000 INJECTION INTRAVENOUS; SUBCUTANEOUS at 07:02

## 2025-07-20 RX ADMIN — HEPARIN SODIUM 900 UNIT(S)/HR: 1000 INJECTION INTRAVENOUS; SUBCUTANEOUS at 19:08

## 2025-07-20 RX ADMIN — Medication 25 GRAM(S): at 10:02

## 2025-07-20 RX ADMIN — TIZANIDINE 2 MILLIGRAM(S): 4 TABLET ORAL at 07:17

## 2025-07-20 RX ADMIN — ATORVASTATIN CALCIUM 20 MILLIGRAM(S): 80 TABLET, FILM COATED ORAL at 23:46

## 2025-07-20 RX ADMIN — TIZANIDINE 2 MILLIGRAM(S): 4 TABLET ORAL at 00:00

## 2025-07-20 RX ADMIN — HEPARIN SODIUM 800 UNIT(S)/HR: 1000 INJECTION INTRAVENOUS; SUBCUTANEOUS at 11:24

## 2025-07-20 RX ADMIN — Medication 1 DOSE(S): at 21:17

## 2025-07-20 RX ADMIN — CLOPIDOGREL BISULFATE 75 MILLIGRAM(S): 75 TABLET, FILM COATED ORAL at 10:01

## 2025-07-20 RX ADMIN — TIZANIDINE 2 MILLIGRAM(S): 4 TABLET ORAL at 23:46

## 2025-07-20 RX ADMIN — TIZANIDINE 2 MILLIGRAM(S): 4 TABLET ORAL at 13:28

## 2025-07-20 RX ADMIN — HYDROXYCHLOROQUINE SULFATE 200 MILLIGRAM(S): 200 TABLET, FILM COATED ORAL at 10:02

## 2025-07-20 RX ADMIN — HEPARIN SODIUM 900 UNIT(S)/HR: 1000 INJECTION INTRAVENOUS; SUBCUTANEOUS at 18:34

## 2025-07-21 LAB
ANION GAP SERPL CALC-SCNC: 8 MMOL/L — SIGNIFICANT CHANGE UP (ref 5–17)
APTT BLD: 66.5 SEC — HIGH (ref 26.1–36.8)
APTT BLD: 68.2 SEC — HIGH (ref 26.1–36.8)
BUN SERPL-MCNC: 33 MG/DL — HIGH (ref 7–23)
CALCIUM SERPL-MCNC: 8.2 MG/DL — LOW (ref 8.5–10.1)
CHLORIDE SERPL-SCNC: 106 MMOL/L — SIGNIFICANT CHANGE UP (ref 96–108)
CO2 SERPL-SCNC: 25 MMOL/L — SIGNIFICANT CHANGE UP (ref 22–31)
CREAT SERPL-MCNC: 0.86 MG/DL — SIGNIFICANT CHANGE UP (ref 0.5–1.3)
EGFR: 67 ML/MIN/1.73M2 — SIGNIFICANT CHANGE UP
EGFR: 67 ML/MIN/1.73M2 — SIGNIFICANT CHANGE UP
GLUCOSE SERPL-MCNC: 85 MG/DL — SIGNIFICANT CHANGE UP (ref 70–99)
HCT VFR BLD CALC: 34.4 % — LOW (ref 34.5–45)
HGB BLD-MCNC: 11.2 G/DL — LOW (ref 11.5–15.5)
MAGNESIUM SERPL-MCNC: 2.4 MG/DL — SIGNIFICANT CHANGE UP (ref 1.6–2.6)
MCHC RBC-ENTMCNC: 30.9 PG — SIGNIFICANT CHANGE UP (ref 27–34)
MCHC RBC-ENTMCNC: 32.6 G/DL — SIGNIFICANT CHANGE UP (ref 32–36)
MCV RBC AUTO: 94.8 FL — SIGNIFICANT CHANGE UP (ref 80–100)
NRBC # BLD AUTO: 0 K/UL — SIGNIFICANT CHANGE UP (ref 0–0)
NRBC # FLD: 0 K/UL — SIGNIFICANT CHANGE UP (ref 0–0)
NRBC BLD AUTO-RTO: 0 /100 WBCS — SIGNIFICANT CHANGE UP (ref 0–0)
PHOSPHATE SERPL-MCNC: 3 MG/DL — SIGNIFICANT CHANGE UP (ref 2.5–4.5)
PLATELET # BLD AUTO: 175 K/UL — SIGNIFICANT CHANGE UP (ref 150–400)
PMV BLD: 11.1 FL — SIGNIFICANT CHANGE UP (ref 7–13)
POTASSIUM SERPL-MCNC: 4 MMOL/L — SIGNIFICANT CHANGE UP (ref 3.5–5.3)
POTASSIUM SERPL-SCNC: 4 MMOL/L — SIGNIFICANT CHANGE UP (ref 3.5–5.3)
RBC # BLD: 3.63 M/UL — LOW (ref 3.8–5.2)
RBC # FLD: 14.5 % — SIGNIFICANT CHANGE UP (ref 10.3–14.5)
SODIUM SERPL-SCNC: 139 MMOL/L — SIGNIFICANT CHANGE UP (ref 135–145)
WBC # BLD: 7.84 K/UL — SIGNIFICANT CHANGE UP (ref 3.8–10.5)
WBC # FLD AUTO: 7.84 K/UL — SIGNIFICANT CHANGE UP (ref 3.8–10.5)

## 2025-07-21 PROCEDURE — 99222 1ST HOSP IP/OBS MODERATE 55: CPT | Mod: FS

## 2025-07-21 PROCEDURE — 99233 SBSQ HOSP IP/OBS HIGH 50: CPT

## 2025-07-21 RX ORDER — DAPAGLIFLOZIN 5 MG/1
5 TABLET, FILM COATED ORAL DAILY
Refills: 0 | Status: DISCONTINUED | OUTPATIENT
Start: 2025-07-21 | End: 2025-07-22

## 2025-07-21 RX ORDER — HEPARIN SODIUM 1000 [USP'U]/ML
5000 INJECTION INTRAVENOUS; SUBCUTANEOUS EVERY 8 HOURS
Refills: 0 | Status: DISCONTINUED | OUTPATIENT
Start: 2025-07-21 | End: 2025-07-22

## 2025-07-21 RX ORDER — SACUBITRIL AND VALSARTAN 6; 6 MG/1; MG/1
1 PELLET ORAL
Refills: 0 | Status: DISCONTINUED | OUTPATIENT
Start: 2025-07-21 | End: 2025-07-22

## 2025-07-21 RX ADMIN — HYDROXYCHLOROQUINE SULFATE 200 MILLIGRAM(S): 200 TABLET, FILM COATED ORAL at 18:44

## 2025-07-21 RX ADMIN — Medication 1 DOSE(S): at 08:59

## 2025-07-21 RX ADMIN — Medication 81 MILLIGRAM(S): at 09:35

## 2025-07-21 RX ADMIN — HEPARIN SODIUM 900 UNIT(S)/HR: 1000 INJECTION INTRAVENOUS; SUBCUTANEOUS at 00:58

## 2025-07-21 RX ADMIN — TIOTROPIUM BROMIDE INHALATION SPRAY 2 PUFF(S): 3.12 SPRAY, METERED RESPIRATORY (INHALATION) at 08:59

## 2025-07-21 RX ADMIN — HEPARIN SODIUM 900 UNIT(S)/HR: 1000 INJECTION INTRAVENOUS; SUBCUTANEOUS at 09:15

## 2025-07-21 RX ADMIN — HEPARIN SODIUM 5000 UNIT(S): 1000 INJECTION INTRAVENOUS; SUBCUTANEOUS at 21:30

## 2025-07-21 RX ADMIN — SACUBITRIL AND VALSARTAN 1 TABLET(S): 6; 6 PELLET ORAL at 21:30

## 2025-07-21 RX ADMIN — TIZANIDINE 2 MILLIGRAM(S): 4 TABLET ORAL at 08:29

## 2025-07-21 RX ADMIN — Medication 1 APPLICATION(S): at 12:30

## 2025-07-21 RX ADMIN — DAPAGLIFLOZIN 5 MILLIGRAM(S): 5 TABLET, FILM COATED ORAL at 21:28

## 2025-07-21 RX ADMIN — ATORVASTATIN CALCIUM 20 MILLIGRAM(S): 80 TABLET, FILM COATED ORAL at 21:31

## 2025-07-21 RX ADMIN — Medication 1 DOSE(S): at 20:50

## 2025-07-21 RX ADMIN — HEPARIN SODIUM 900 UNIT(S)/HR: 1000 INJECTION INTRAVENOUS; SUBCUTANEOUS at 01:00

## 2025-07-21 RX ADMIN — CLOPIDOGREL BISULFATE 75 MILLIGRAM(S): 75 TABLET, FILM COATED ORAL at 09:35

## 2025-07-22 ENCOUNTER — TRANSCRIPTION ENCOUNTER (OUTPATIENT)
Age: 85
End: 2025-07-22

## 2025-07-22 VITALS
SYSTOLIC BLOOD PRESSURE: 125 MMHG | OXYGEN SATURATION: 97 % | HEART RATE: 96 BPM | RESPIRATION RATE: 19 BRPM | TEMPERATURE: 98 F | DIASTOLIC BLOOD PRESSURE: 63 MMHG

## 2025-07-22 LAB
A FUMIGATUS IGG SER QL: 4.1 MCG/ML — SIGNIFICANT CHANGE UP
ALTERNARIA TENUIS/ALTERNATA IGG: 2.7 MCG/ML — SIGNIFICANT CHANGE UP
ANION GAP SERPL CALC-SCNC: 6 MMOL/L — SIGNIFICANT CHANGE UP (ref 5–17)
APTT BLD: 31.3 SEC — SIGNIFICANT CHANGE UP (ref 26.1–36.8)
AUREOBASIDIUM PULLULANS IGG: 2.8 MCG/ML — SIGNIFICANT CHANGE UP
BUN SERPL-MCNC: 17 MG/DL — SIGNIFICANT CHANGE UP (ref 7–23)
CALCIUM SERPL-MCNC: 8.5 MG/DL — SIGNIFICANT CHANGE UP (ref 8.5–10.1)
CHLORIDE SERPL-SCNC: 109 MMOL/L — HIGH (ref 96–108)
CO2 SERPL-SCNC: 25 MMOL/L — SIGNIFICANT CHANGE UP (ref 22–31)
CREAT SERPL-MCNC: 0.69 MG/DL — SIGNIFICANT CHANGE UP (ref 0.5–1.3)
CRP SERPL HS-MCNC: 2.62 MG/L — SIGNIFICANT CHANGE UP (ref 0–3)
EGFR: 86 ML/MIN/1.73M2 — SIGNIFICANT CHANGE UP
EGFR: 86 ML/MIN/1.73M2 — SIGNIFICANT CHANGE UP
ERYTHROCYTE [SEDIMENTATION RATE] IN BLOOD: 8 MM/HR — SIGNIFICANT CHANGE UP (ref 0–20)
GLUCOSE SERPL-MCNC: 95 MG/DL — SIGNIFICANT CHANGE UP (ref 70–99)
HCT VFR BLD CALC: 37.1 % — SIGNIFICANT CHANGE UP (ref 34.5–45)
HGB BLD-MCNC: 11.8 G/DL — SIGNIFICANT CHANGE UP (ref 11.5–15.5)
LACEYELLA SACCHARI IGG: 5.4 MCG/ML — SIGNIFICANT CHANGE UP
MAGNESIUM SERPL-MCNC: 2.3 MG/DL — SIGNIFICANT CHANGE UP (ref 1.6–2.6)
MCHC RBC-ENTMCNC: 29.9 PG — SIGNIFICANT CHANGE UP (ref 27–34)
MCHC RBC-ENTMCNC: 31.8 G/DL — LOW (ref 32–36)
MCV RBC AUTO: 94.2 FL — SIGNIFICANT CHANGE UP (ref 80–100)
MICROPOLYSPORA FAENI IGG: <2 MCG/ML — SIGNIFICANT CHANGE UP
NRBC # BLD AUTO: 0 K/UL — SIGNIFICANT CHANGE UP (ref 0–0)
NRBC # FLD: 0 K/UL — SIGNIFICANT CHANGE UP (ref 0–0)
NRBC BLD AUTO-RTO: 0 /100 WBCS — SIGNIFICANT CHANGE UP (ref 0–0)
NT-PROBNP SERPL-SCNC: 6494 PG/ML — HIGH (ref 0–450)
PENICILLIUM CHRYSOGENUM/NOTATUM IGG: 3.9 MCG/ML — SIGNIFICANT CHANGE UP
PHOMA BETAE IGG: 3.3 MCG/ML — SIGNIFICANT CHANGE UP
PHOSPHATE SERPL-MCNC: 3.2 MG/DL — SIGNIFICANT CHANGE UP (ref 2.5–4.5)
PLATELET # BLD AUTO: 201 K/UL — SIGNIFICANT CHANGE UP (ref 150–400)
PMV BLD: 10.7 FL — SIGNIFICANT CHANGE UP (ref 7–13)
POTASSIUM SERPL-MCNC: 4 MMOL/L — SIGNIFICANT CHANGE UP (ref 3.5–5.3)
POTASSIUM SERPL-SCNC: 4 MMOL/L — SIGNIFICANT CHANGE UP (ref 3.5–5.3)
RBC # BLD: 3.94 M/UL — SIGNIFICANT CHANGE UP (ref 3.8–5.2)
RBC # FLD: 14.3 % — SIGNIFICANT CHANGE UP (ref 10.3–14.5)
SODIUM SERPL-SCNC: 140 MMOL/L — SIGNIFICANT CHANGE UP (ref 135–145)
TRICHODERMA VIRIDE IGG: 3.1 MCG/ML — SIGNIFICANT CHANGE UP
WBC # BLD: 8.35 K/UL — SIGNIFICANT CHANGE UP (ref 3.8–10.5)
WBC # FLD AUTO: 8.35 K/UL — SIGNIFICANT CHANGE UP (ref 3.8–10.5)

## 2025-07-22 PROCEDURE — 99239 HOSP IP/OBS DSCHRG MGMT >30: CPT

## 2025-07-22 RX ORDER — SACUBITRIL AND VALSARTAN 6; 6 MG/1; MG/1
1 PELLET ORAL
Qty: 60 | Refills: 0
Start: 2025-07-22 | End: 2025-08-20

## 2025-07-22 RX ORDER — DAPAGLIFLOZIN 5 MG/1
1 TABLET, FILM COATED ORAL
Qty: 30 | Refills: 0
Start: 2025-07-22 | End: 2025-08-20

## 2025-07-22 RX ADMIN — TIOTROPIUM BROMIDE INHALATION SPRAY 2 PUFF(S): 3.12 SPRAY, METERED RESPIRATORY (INHALATION) at 08:27

## 2025-07-22 RX ADMIN — CLOPIDOGREL BISULFATE 75 MILLIGRAM(S): 75 TABLET, FILM COATED ORAL at 09:34

## 2025-07-22 RX ADMIN — Medication 81 MILLIGRAM(S): at 09:34

## 2025-07-22 RX ADMIN — DAPAGLIFLOZIN 5 MILLIGRAM(S): 5 TABLET, FILM COATED ORAL at 09:32

## 2025-07-22 RX ADMIN — TIZANIDINE 2 MILLIGRAM(S): 4 TABLET ORAL at 04:04

## 2025-07-22 RX ADMIN — Medication 1 DOSE(S): at 08:24

## 2025-07-22 RX ADMIN — SACUBITRIL AND VALSARTAN 1 TABLET(S): 6; 6 PELLET ORAL at 09:34

## 2025-07-22 RX ADMIN — HEPARIN SODIUM 5000 UNIT(S): 1000 INJECTION INTRAVENOUS; SUBCUTANEOUS at 05:31

## 2025-07-23 LAB — ANA TITR SER: NEGATIVE — SIGNIFICANT CHANGE UP

## 2025-07-24 LAB
CULTURE RESULTS: SIGNIFICANT CHANGE UP
CULTURE RESULTS: SIGNIFICANT CHANGE UP
SPECIMEN SOURCE: SIGNIFICANT CHANGE UP
SPECIMEN SOURCE: SIGNIFICANT CHANGE UP

## 2025-07-28 DIAGNOSIS — I25.10 ATHEROSCLEROTIC HEART DISEASE OF NATIVE CORONARY ARTERY WITHOUT ANGINA PECTORIS: ICD-10-CM

## 2025-07-28 DIAGNOSIS — Z79.01 LONG TERM (CURRENT) USE OF ANTICOAGULANTS: ICD-10-CM

## 2025-07-28 DIAGNOSIS — I24.89 OTHER FORMS OF ACUTE ISCHEMIC HEART DISEASE: ICD-10-CM

## 2025-07-28 DIAGNOSIS — Z88.1 ALLERGY STATUS TO OTHER ANTIBIOTIC AGENTS: ICD-10-CM

## 2025-07-28 DIAGNOSIS — Z77.128 CONTACT WITH AND (SUSPECTED) EXPOSURE TO OTHER HAZARDS IN THE PHYSICAL ENVIRONMENT: ICD-10-CM

## 2025-07-28 DIAGNOSIS — I51.4 MYOCARDITIS, UNSPECIFIED: ICD-10-CM

## 2025-07-28 DIAGNOSIS — E87.20 ACIDOSIS, UNSPECIFIED: ICD-10-CM

## 2025-07-28 DIAGNOSIS — I44.7 LEFT BUNDLE-BRANCH BLOCK, UNSPECIFIED: ICD-10-CM

## 2025-07-28 DIAGNOSIS — I35.0 NONRHEUMATIC AORTIC (VALVE) STENOSIS: ICD-10-CM

## 2025-07-28 DIAGNOSIS — I11.0 HYPERTENSIVE HEART DISEASE WITH HEART FAILURE: ICD-10-CM

## 2025-07-28 DIAGNOSIS — Z95.5 PRESENCE OF CORONARY ANGIOPLASTY IMPLANT AND GRAFT: ICD-10-CM

## 2025-07-28 DIAGNOSIS — Z88.0 ALLERGY STATUS TO PENICILLIN: ICD-10-CM

## 2025-07-28 DIAGNOSIS — I50.23 ACUTE ON CHRONIC SYSTOLIC (CONGESTIVE) HEART FAILURE: ICD-10-CM

## 2025-07-28 DIAGNOSIS — J44.9 CHRONIC OBSTRUCTIVE PULMONARY DISEASE, UNSPECIFIED: ICD-10-CM

## 2025-07-28 DIAGNOSIS — E43 UNSPECIFIED SEVERE PROTEIN-CALORIE MALNUTRITION: ICD-10-CM

## 2025-07-28 DIAGNOSIS — J96.01 ACUTE RESPIRATORY FAILURE WITH HYPOXIA: ICD-10-CM

## 2025-07-29 ENCOUNTER — TRANSCRIPTION ENCOUNTER (OUTPATIENT)
Age: 85
End: 2025-07-29

## 2025-08-01 ENCOUNTER — APPOINTMENT (OUTPATIENT)
Dept: ELECTROPHYSIOLOGY | Facility: CLINIC | Age: 85
End: 2025-08-01

## 2025-08-01 VITALS
DIASTOLIC BLOOD PRESSURE: 81 MMHG | HEIGHT: 61 IN | OXYGEN SATURATION: 98 % | BODY MASS INDEX: 16.99 KG/M2 | WEIGHT: 90 LBS | HEART RATE: 93 BPM | SYSTOLIC BLOOD PRESSURE: 134 MMHG

## 2025-08-01 DIAGNOSIS — I50.22 CHRONIC SYSTOLIC (CONGESTIVE) HEART FAILURE: ICD-10-CM

## 2025-08-01 RX ORDER — DAPAGLIFLOZIN 10 MG/1
10 TABLET, FILM COATED ORAL
Refills: 0 | Status: ACTIVE | COMMUNITY

## 2025-08-04 ENCOUNTER — TRANSCRIPTION ENCOUNTER (OUTPATIENT)
Age: 85
End: 2025-08-04

## 2025-08-07 ENCOUNTER — OUTPATIENT (OUTPATIENT)
Dept: OUTPATIENT SERVICES | Facility: HOSPITAL | Age: 85
LOS: 1 days | End: 2025-08-07
Payer: MEDICARE

## 2025-08-07 ENCOUNTER — RESULT REVIEW (OUTPATIENT)
Age: 85
End: 2025-08-07

## 2025-08-07 VITALS
RESPIRATION RATE: 18 BRPM | HEIGHT: 60 IN | DIASTOLIC BLOOD PRESSURE: 61 MMHG | HEART RATE: 85 BPM | WEIGHT: 85.98 LBS | TEMPERATURE: 98 F | SYSTOLIC BLOOD PRESSURE: 120 MMHG | OXYGEN SATURATION: 100 %

## 2025-08-07 DIAGNOSIS — Z90.89 ACQUIRED ABSENCE OF OTHER ORGANS: Chronic | ICD-10-CM

## 2025-08-07 DIAGNOSIS — Z98.890 OTHER SPECIFIED POSTPROCEDURAL STATES: Chronic | ICD-10-CM

## 2025-08-07 DIAGNOSIS — I50.22 CHRONIC SYSTOLIC (CONGESTIVE) HEART FAILURE: ICD-10-CM

## 2025-08-07 DIAGNOSIS — Z01.818 ENCOUNTER FOR OTHER PREPROCEDURAL EXAMINATION: ICD-10-CM

## 2025-08-07 LAB
ANION GAP SERPL CALC-SCNC: 5 MMOL/L — SIGNIFICANT CHANGE UP (ref 5–17)
BASOPHILS # BLD AUTO: 0.1 K/UL — SIGNIFICANT CHANGE UP (ref 0–0.2)
BASOPHILS NFR BLD AUTO: 1.2 % — SIGNIFICANT CHANGE UP (ref 0–2)
BLD GP AB SCN SERPL QL: SIGNIFICANT CHANGE UP
BUN SERPL-MCNC: 36 MG/DL — HIGH (ref 7–23)
CALCIUM SERPL-MCNC: 9.3 MG/DL — SIGNIFICANT CHANGE UP (ref 8.5–10.1)
CHLORIDE SERPL-SCNC: 106 MMOL/L — SIGNIFICANT CHANGE UP (ref 96–108)
CO2 SERPL-SCNC: 27 MMOL/L — SIGNIFICANT CHANGE UP (ref 22–31)
CREAT SERPL-MCNC: 1.04 MG/DL — SIGNIFICANT CHANGE UP (ref 0.5–1.3)
EGFR: 53 ML/MIN/1.73M2 — LOW
EGFR: 53 ML/MIN/1.73M2 — LOW
EOSINOPHIL # BLD AUTO: 0.3 K/UL — SIGNIFICANT CHANGE UP (ref 0–0.5)
EOSINOPHIL NFR BLD AUTO: 3.6 % — SIGNIFICANT CHANGE UP (ref 0–6)
GLUCOSE SERPL-MCNC: 95 MG/DL — SIGNIFICANT CHANGE UP (ref 70–99)
HCT VFR BLD CALC: 41.1 % — SIGNIFICANT CHANGE UP (ref 34.5–45)
HGB BLD-MCNC: 12.5 G/DL — SIGNIFICANT CHANGE UP (ref 11.5–15.5)
IMM GRANULOCYTES # BLD AUTO: 0.03 K/UL — SIGNIFICANT CHANGE UP (ref 0–0.07)
IMM GRANULOCYTES NFR BLD AUTO: 0.4 % — SIGNIFICANT CHANGE UP (ref 0–0.9)
LYMPHOCYTES # BLD AUTO: 1.79 K/UL — SIGNIFICANT CHANGE UP (ref 1–3.3)
LYMPHOCYTES NFR BLD AUTO: 21.4 % — SIGNIFICANT CHANGE UP (ref 13–44)
MCHC RBC-ENTMCNC: 29.3 PG — SIGNIFICANT CHANGE UP (ref 27–34)
MCHC RBC-ENTMCNC: 30.4 G/DL — LOW (ref 32–36)
MCV RBC AUTO: 96.5 FL — SIGNIFICANT CHANGE UP (ref 80–100)
MONOCYTES # BLD AUTO: 0.47 K/UL — SIGNIFICANT CHANGE UP (ref 0–0.9)
MONOCYTES NFR BLD AUTO: 5.6 % — SIGNIFICANT CHANGE UP (ref 2–14)
NEUTROPHILS # BLD AUTO: 5.68 K/UL — SIGNIFICANT CHANGE UP (ref 1.8–7.4)
NEUTROPHILS NFR BLD AUTO: 67.8 % — SIGNIFICANT CHANGE UP (ref 43–77)
NRBC # BLD AUTO: 0 K/UL — SIGNIFICANT CHANGE UP (ref 0–0)
NRBC # FLD: 0 K/UL — SIGNIFICANT CHANGE UP (ref 0–0)
NRBC BLD AUTO-RTO: 0 /100 WBCS — SIGNIFICANT CHANGE UP (ref 0–0)
PLATELET # BLD AUTO: 375 K/UL — SIGNIFICANT CHANGE UP (ref 150–400)
PMV BLD: 9.9 FL — SIGNIFICANT CHANGE UP (ref 7–13)
POTASSIUM SERPL-MCNC: 4.7 MMOL/L — SIGNIFICANT CHANGE UP (ref 3.5–5.3)
POTASSIUM SERPL-SCNC: 4.7 MMOL/L — SIGNIFICANT CHANGE UP (ref 3.5–5.3)
RBC # BLD: 4.26 M/UL — SIGNIFICANT CHANGE UP (ref 3.8–5.2)
RBC # FLD: 14.1 % — SIGNIFICANT CHANGE UP (ref 10.3–14.5)
SODIUM SERPL-SCNC: 138 MMOL/L — SIGNIFICANT CHANGE UP (ref 135–145)
WBC # BLD: 8.37 K/UL — SIGNIFICANT CHANGE UP (ref 3.8–10.5)
WBC # FLD AUTO: 8.37 K/UL — SIGNIFICANT CHANGE UP (ref 3.8–10.5)

## 2025-08-07 PROCEDURE — 86901 BLOOD TYPING SEROLOGIC RH(D): CPT

## 2025-08-07 PROCEDURE — 87640 STAPH A DNA AMP PROBE: CPT

## 2025-08-07 PROCEDURE — 87641 MR-STAPH DNA AMP PROBE: CPT

## 2025-08-07 PROCEDURE — 93010 ELECTROCARDIOGRAM REPORT: CPT

## 2025-08-07 PROCEDURE — 36415 COLL VENOUS BLD VENIPUNCTURE: CPT

## 2025-08-07 PROCEDURE — 99214 OFFICE O/P EST MOD 30 MIN: CPT | Mod: 25

## 2025-08-07 PROCEDURE — 80048 BASIC METABOLIC PNL TOTAL CA: CPT

## 2025-08-07 PROCEDURE — 93005 ELECTROCARDIOGRAM TRACING: CPT

## 2025-08-07 PROCEDURE — 85025 COMPLETE CBC W/AUTO DIFF WBC: CPT

## 2025-08-07 PROCEDURE — 71046 X-RAY EXAM CHEST 2 VIEWS: CPT | Mod: 26

## 2025-08-07 PROCEDURE — 86850 RBC ANTIBODY SCREEN: CPT

## 2025-08-07 PROCEDURE — 71046 X-RAY EXAM CHEST 2 VIEWS: CPT

## 2025-08-07 PROCEDURE — 86900 BLOOD TYPING SEROLOGIC ABO: CPT

## 2025-08-08 DIAGNOSIS — I50.22 CHRONIC SYSTOLIC (CONGESTIVE) HEART FAILURE: ICD-10-CM

## 2025-08-08 DIAGNOSIS — Z01.818 ENCOUNTER FOR OTHER PREPROCEDURAL EXAMINATION: ICD-10-CM

## 2025-08-08 LAB
MRSA PCR RESULT.: SIGNIFICANT CHANGE UP
S AUREUS DNA NOSE QL NAA+PROBE: SIGNIFICANT CHANGE UP

## 2025-08-12 ENCOUNTER — TRANSCRIPTION ENCOUNTER (OUTPATIENT)
Age: 85
End: 2025-08-12

## 2025-08-13 ENCOUNTER — OUTPATIENT (OUTPATIENT)
Dept: OUTPATIENT SERVICES | Facility: HOSPITAL | Age: 85
LOS: 1 days | Discharge: HOME | End: 2025-08-13
Payer: MEDICARE

## 2025-08-13 ENCOUNTER — RESULT REVIEW (OUTPATIENT)
Age: 85
End: 2025-08-13

## 2025-08-13 VITALS
SYSTOLIC BLOOD PRESSURE: 129 MMHG | HEART RATE: 82 BPM | DIASTOLIC BLOOD PRESSURE: 69 MMHG | WEIGHT: 89.95 LBS | OXYGEN SATURATION: 100 % | TEMPERATURE: 97 F | HEIGHT: 60 IN | RESPIRATION RATE: 18 BRPM

## 2025-08-13 DIAGNOSIS — Z90.89 ACQUIRED ABSENCE OF OTHER ORGANS: Chronic | ICD-10-CM

## 2025-08-13 DIAGNOSIS — Z98.890 OTHER SPECIFIED POSTPROCEDURAL STATES: Chronic | ICD-10-CM

## 2025-08-13 DIAGNOSIS — I50.22 CHRONIC SYSTOLIC (CONGESTIVE) HEART FAILURE: ICD-10-CM

## 2025-08-13 PROCEDURE — 36415 COLL VENOUS BLD VENIPUNCTURE: CPT

## 2025-08-13 PROCEDURE — 93010 ELECTROCARDIOGRAM REPORT: CPT

## 2025-08-13 PROCEDURE — C1894: CPT

## 2025-08-13 PROCEDURE — 33225 L VENTRIC PACING LEAD ADD-ON: CPT

## 2025-08-13 PROCEDURE — 71045 X-RAY EXAM CHEST 1 VIEW: CPT

## 2025-08-13 PROCEDURE — 85027 COMPLETE CBC AUTOMATED: CPT

## 2025-08-13 PROCEDURE — C1887: CPT

## 2025-08-13 PROCEDURE — 71045 X-RAY EXAM CHEST 1 VIEW: CPT | Mod: 26

## 2025-08-13 PROCEDURE — 80048 BASIC METABOLIC PNL TOTAL CA: CPT

## 2025-08-13 PROCEDURE — C1898: CPT

## 2025-08-13 PROCEDURE — C1889: CPT

## 2025-08-13 PROCEDURE — C1769: CPT

## 2025-08-13 PROCEDURE — 93005 ELECTROCARDIOGRAM TRACING: CPT | Mod: XU

## 2025-08-13 PROCEDURE — C1900: CPT

## 2025-08-13 PROCEDURE — C1882: CPT

## 2025-08-13 PROCEDURE — C1777: CPT

## 2025-08-13 PROCEDURE — 33249 INSJ/RPLCMT DEFIB W/LEAD(S): CPT

## 2025-08-13 RX ORDER — FUROSEMIDE 10 MG/ML
20 INJECTION INTRAMUSCULAR; INTRAVENOUS DAILY
Refills: 0 | Status: DISCONTINUED | OUTPATIENT
Start: 2025-08-13 | End: 2025-08-14

## 2025-08-13 RX ORDER — ASPIRIN 325 MG
81 TABLET ORAL DAILY
Refills: 0 | Status: DISCONTINUED | OUTPATIENT
Start: 2025-08-13 | End: 2025-08-14

## 2025-08-13 RX ORDER — DAPAGLIFLOZIN 5 MG/1
10 TABLET, FILM COATED ORAL DAILY
Refills: 0 | Status: DISCONTINUED | OUTPATIENT
Start: 2025-08-14 | End: 2025-08-14

## 2025-08-13 RX ORDER — DAPAGLIFLOZIN 5 MG/1
1 TABLET, FILM COATED ORAL
Refills: 0 | DISCHARGE

## 2025-08-13 RX ORDER — CLOPIDOGREL BISULFATE 75 MG/1
75 TABLET, FILM COATED ORAL DAILY
Refills: 0 | Status: DISCONTINUED | OUTPATIENT
Start: 2025-08-13 | End: 2025-08-14

## 2025-08-13 RX ORDER — FUROSEMIDE 10 MG/ML
1 INJECTION INTRAMUSCULAR; INTRAVENOUS
Refills: 0 | DISCHARGE

## 2025-08-13 RX ORDER — SUMATRIPTAN 100 MG/1
1 TABLET, FILM COATED ORAL
Refills: 0 | DISCHARGE

## 2025-08-13 RX ORDER — SACUBITRIL AND VALSARTAN 6; 6 MG/1; MG/1
1 PELLET ORAL
Refills: 0 | Status: DISCONTINUED | OUTPATIENT
Start: 2025-08-13 | End: 2025-08-14

## 2025-08-13 RX ORDER — HYDROXYCHLOROQUINE SULFATE 200 MG/1
200 TABLET, FILM COATED ORAL
Refills: 0 | Status: DISCONTINUED | OUTPATIENT
Start: 2025-08-13 | End: 2025-08-14

## 2025-08-13 RX ORDER — VANCOMYCIN HCL IN 5 % DEXTROSE 1.5G/250ML
1000 PLASTIC BAG, INJECTION (ML) INTRAVENOUS ONCE
Refills: 0 | Status: DISCONTINUED | OUTPATIENT
Start: 2025-08-13 | End: 2025-08-13

## 2025-08-13 RX ORDER — HYDROXYCHLOROQUINE SULFATE 200 MG/1
1 TABLET, FILM COATED ORAL
Refills: 0 | DISCHARGE

## 2025-08-13 RX ORDER — TIZANIDINE 4 MG/1
2 TABLET ORAL EVERY 8 HOURS
Refills: 0 | Status: DISCONTINUED | OUTPATIENT
Start: 2025-08-13 | End: 2025-08-14

## 2025-08-13 RX ORDER — ACETAMINOPHEN 500 MG/5ML
650 LIQUID (ML) ORAL EVERY 6 HOURS
Refills: 0 | Status: DISCONTINUED | OUTPATIENT
Start: 2025-08-13 | End: 2025-08-14

## 2025-08-13 RX ORDER — DENOSUMAB 60 MG/ML
60 INJECTION SUBCUTANEOUS
Refills: 0 | DISCHARGE

## 2025-08-13 RX ADMIN — TIZANIDINE 2 MILLIGRAM(S): 4 TABLET ORAL at 22:29

## 2025-08-13 RX ADMIN — SACUBITRIL AND VALSARTAN 1 TABLET(S): 6; 6 PELLET ORAL at 21:28

## 2025-08-13 RX ADMIN — Medication 650 MILLIGRAM(S): at 21:00

## 2025-08-13 RX ADMIN — TIZANIDINE 2 MILLIGRAM(S): 4 TABLET ORAL at 14:06

## 2025-08-13 RX ADMIN — Medication 650 MILLIGRAM(S): at 20:04

## 2025-08-14 ENCOUNTER — TRANSCRIPTION ENCOUNTER (OUTPATIENT)
Age: 85
End: 2025-08-14

## 2025-08-14 VITALS
RESPIRATION RATE: 18 BRPM | WEIGHT: 87.74 LBS | HEART RATE: 51 BPM | SYSTOLIC BLOOD PRESSURE: 99 MMHG | TEMPERATURE: 98 F | OXYGEN SATURATION: 98 % | DIASTOLIC BLOOD PRESSURE: 67 MMHG

## 2025-08-14 PROBLEM — J81.0 ACUTE PULMONARY EDEMA: Chronic | Status: ACTIVE | Noted: 2025-08-07

## 2025-08-14 PROBLEM — I35.0 NONRHEUMATIC AORTIC (VALVE) STENOSIS: Chronic | Status: ACTIVE | Noted: 2025-08-07

## 2025-08-14 PROBLEM — M06.9 RHEUMATOID ARTHRITIS, UNSPECIFIED: Chronic | Status: ACTIVE | Noted: 2025-08-07

## 2025-08-14 PROBLEM — I25.10 ATHEROSCLEROTIC HEART DISEASE OF NATIVE CORONARY ARTERY WITHOUT ANGINA PECTORIS: Chronic | Status: ACTIVE | Noted: 2025-08-07

## 2025-08-14 PROBLEM — Z95.5 PRESENCE OF CORONARY ANGIOPLASTY IMPLANT AND GRAFT: Chronic | Status: ACTIVE | Noted: 2025-08-07

## 2025-08-14 LAB
ANION GAP SERPL CALC-SCNC: 5 MMOL/L — SIGNIFICANT CHANGE UP (ref 5–17)
BUN SERPL-MCNC: 33 MG/DL — HIGH (ref 7–23)
CALCIUM SERPL-MCNC: 8.3 MG/DL — LOW (ref 8.5–10.1)
CHLORIDE SERPL-SCNC: 116 MMOL/L — HIGH (ref 96–108)
CO2 SERPL-SCNC: 22 MMOL/L — SIGNIFICANT CHANGE UP (ref 22–31)
CREAT SERPL-MCNC: 0.69 MG/DL — SIGNIFICANT CHANGE UP (ref 0.5–1.3)
EGFR: 86 ML/MIN/1.73M2 — SIGNIFICANT CHANGE UP
EGFR: 86 ML/MIN/1.73M2 — SIGNIFICANT CHANGE UP
GLUCOSE SERPL-MCNC: 97 MG/DL — SIGNIFICANT CHANGE UP (ref 70–99)
HCT VFR BLD CALC: 35.3 % — SIGNIFICANT CHANGE UP (ref 34.5–45)
HGB BLD-MCNC: 10.9 G/DL — LOW (ref 11.5–15.5)
MCHC RBC-ENTMCNC: 29.9 PG — SIGNIFICANT CHANGE UP (ref 27–34)
MCHC RBC-ENTMCNC: 30.9 G/DL — LOW (ref 32–36)
MCV RBC AUTO: 96.7 FL — SIGNIFICANT CHANGE UP (ref 80–100)
NRBC # BLD AUTO: 0 K/UL — SIGNIFICANT CHANGE UP (ref 0–0)
NRBC # FLD: 0 K/UL — SIGNIFICANT CHANGE UP (ref 0–0)
NRBC BLD AUTO-RTO: 0 /100 WBCS — SIGNIFICANT CHANGE UP (ref 0–0)
PLATELET # BLD AUTO: 217 K/UL — SIGNIFICANT CHANGE UP (ref 150–400)
PMV BLD: 10.4 FL — SIGNIFICANT CHANGE UP (ref 7–13)
POTASSIUM SERPL-MCNC: 3.8 MMOL/L — SIGNIFICANT CHANGE UP (ref 3.5–5.3)
POTASSIUM SERPL-SCNC: 3.8 MMOL/L — SIGNIFICANT CHANGE UP (ref 3.5–5.3)
RBC # BLD: 3.65 M/UL — LOW (ref 3.8–5.2)
RBC # FLD: 14.3 % — SIGNIFICANT CHANGE UP (ref 10.3–14.5)
SODIUM SERPL-SCNC: 143 MMOL/L — SIGNIFICANT CHANGE UP (ref 135–145)
WBC # BLD: 9.02 K/UL — SIGNIFICANT CHANGE UP (ref 3.8–10.5)
WBC # FLD AUTO: 9.02 K/UL — SIGNIFICANT CHANGE UP (ref 3.8–10.5)

## 2025-08-14 PROCEDURE — 93010 ELECTROCARDIOGRAM REPORT: CPT

## 2025-08-14 RX ADMIN — Medication 650 MILLIGRAM(S): at 05:50

## 2025-08-14 RX ADMIN — SACUBITRIL AND VALSARTAN 1 TABLET(S): 6; 6 PELLET ORAL at 09:01

## 2025-08-14 RX ADMIN — CLOPIDOGREL BISULFATE 75 MILLIGRAM(S): 75 TABLET, FILM COATED ORAL at 09:01

## 2025-08-15 ENCOUNTER — TRANSCRIPTION ENCOUNTER (OUTPATIENT)
Age: 85
End: 2025-08-15

## 2025-08-15 DIAGNOSIS — I50.22 CHRONIC SYSTOLIC (CONGESTIVE) HEART FAILURE: ICD-10-CM

## 2025-08-15 DIAGNOSIS — I42.8 OTHER CARDIOMYOPATHIES: ICD-10-CM

## 2025-08-20 ENCOUNTER — NON-APPOINTMENT (OUTPATIENT)
Age: 85
End: 2025-08-20

## 2025-08-22 ENCOUNTER — TRANSCRIPTION ENCOUNTER (OUTPATIENT)
Age: 85
End: 2025-08-22

## 2025-08-25 ENCOUNTER — APPOINTMENT (OUTPATIENT)
Dept: ELECTROPHYSIOLOGY | Facility: CLINIC | Age: 85
End: 2025-08-25
Payer: MEDICARE

## 2025-08-25 VITALS
DIASTOLIC BLOOD PRESSURE: 69 MMHG | WEIGHT: 90 LBS | OXYGEN SATURATION: 99 % | HEIGHT: 60 IN | SYSTOLIC BLOOD PRESSURE: 126 MMHG | HEART RATE: 91 BPM | BODY MASS INDEX: 17.67 KG/M2

## 2025-08-25 DIAGNOSIS — R21 RASH AND OTHER NONSPECIFIC SKIN ERUPTION: ICD-10-CM

## 2025-08-25 PROBLEM — I42.8 CARDIOMYOPATHY, NONISCHEMIC: Status: ACTIVE | Noted: 2025-08-25

## 2025-08-25 PROCEDURE — 99024 POSTOP FOLLOW-UP VISIT: CPT

## 2025-08-25 RX ORDER — CAMPHOR 0.45 %
GEL (GRAM) TOPICAL
Refills: 0 | Status: ACTIVE | COMMUNITY

## 2025-08-25 RX ORDER — HYDROCORTISONE 25 MG/G
2.5 CREAM TOPICAL TWICE DAILY
Qty: 30 | Refills: 0 | Status: ACTIVE | COMMUNITY
Start: 2025-08-25 | End: 1900-01-01

## 2025-08-25 RX ORDER — PREDNISONE 5 MG/1
5 TABLET ORAL
Refills: 0 | Status: ACTIVE | COMMUNITY

## 2025-08-26 PROBLEM — I42.9 CARDIOMYOPATHY, UNSPECIFIED: Chronic | Status: ACTIVE | Noted: 2025-08-07

## 2025-08-26 PROBLEM — J44.9 CHRONIC OBSTRUCTIVE PULMONARY DISEASE, UNSPECIFIED: Chronic | Status: ACTIVE | Noted: 2025-08-07

## 2025-08-26 PROBLEM — M41.9 SCOLIOSIS, UNSPECIFIED: Chronic | Status: ACTIVE | Noted: 2025-08-07

## 2025-09-02 ENCOUNTER — APPOINTMENT (OUTPATIENT)
Dept: ELECTROPHYSIOLOGY | Facility: CLINIC | Age: 85
End: 2025-09-02
Payer: MEDICARE

## 2025-09-02 ENCOUNTER — NON-APPOINTMENT (OUTPATIENT)
Age: 85
End: 2025-09-02

## 2025-09-02 VITALS
HEIGHT: 60 IN | DIASTOLIC BLOOD PRESSURE: 71 MMHG | BODY MASS INDEX: 17.67 KG/M2 | SYSTOLIC BLOOD PRESSURE: 117 MMHG | OXYGEN SATURATION: 98 % | HEART RATE: 89 BPM | WEIGHT: 90 LBS

## 2025-09-02 DIAGNOSIS — I50.22 CHRONIC SYSTOLIC (CONGESTIVE) HEART FAILURE: ICD-10-CM

## 2025-09-02 DIAGNOSIS — I42.8 OTHER CARDIOMYOPATHIES: ICD-10-CM

## 2025-09-02 PROCEDURE — 99024 POSTOP FOLLOW-UP VISIT: CPT

## 2025-09-11 ENCOUNTER — TRANSCRIPTION ENCOUNTER (OUTPATIENT)
Age: 85
End: 2025-09-11

## 2025-09-12 ENCOUNTER — TRANSCRIPTION ENCOUNTER (OUTPATIENT)
Age: 85
End: 2025-09-12

## 2025-09-16 ENCOUNTER — APPOINTMENT (OUTPATIENT)
Dept: ELECTROPHYSIOLOGY | Facility: CLINIC | Age: 85
End: 2025-09-16

## (undated) DEVICE — GLV 8 ULTRAFREE MAX

## (undated) DEVICE — SOL INJ LR 500ML

## (undated) DEVICE — TRAY EPIDURAL SINGLE DOSE

## (undated) DEVICE — CATH IV SAFE BC BLU 22GAX1.0"

## (undated) DEVICE — CATH IV SAFE 24GX3/4

## (undated) DEVICE — PACK IV START WITH CHG

## (undated) DEVICE — GLV 7.5 ULTRAFREE MAX

## (undated) DEVICE — NDL SPNL WHIT 22GX3.5IN

## (undated) DEVICE — NDL SPINAL 22G X 3.5" QUINCKE

## (undated) DEVICE — CATH IV SAFE BC 22G X 1" (BLUE)

## (undated) DEVICE — CATH IV SAFE INSYTE 24G X 3/4" (YELLOW)

## (undated) DEVICE — STYLET  ENDOTRACH 7.5MM X 10MM

## (undated) DEVICE — GLV 8.5 PROTEXIS (WHITE)